# Patient Record
Sex: MALE | Race: WHITE | NOT HISPANIC OR LATINO
[De-identification: names, ages, dates, MRNs, and addresses within clinical notes are randomized per-mention and may not be internally consistent; named-entity substitution may affect disease eponyms.]

---

## 2021-08-02 PROBLEM — Z00.00 ENCOUNTER FOR PREVENTIVE HEALTH EXAMINATION: Status: ACTIVE | Noted: 2021-08-02

## 2021-08-12 ENCOUNTER — APPOINTMENT (OUTPATIENT)
Dept: UROLOGY | Facility: CLINIC | Age: 51
End: 2021-08-12
Payer: MEDICAID

## 2021-08-12 PROCEDURE — 99204 OFFICE O/P NEW MOD 45 MIN: CPT

## 2021-08-13 LAB
ANION GAP SERPL CALC-SCNC: 13 MMOL/L
APPEARANCE: CLEAR
BACTERIA: NEGATIVE
BILIRUBIN URINE: NEGATIVE
BLOOD URINE: NEGATIVE
BUN SERPL-MCNC: 16 MG/DL
CALCIUM SERPL-MCNC: 9.7 MG/DL
CHLORIDE SERPL-SCNC: 98 MMOL/L
CO2 SERPL-SCNC: 29 MMOL/L
COLOR: YELLOW
CREAT SERPL-MCNC: 1.15 MG/DL
GLUCOSE QUALITATIVE U: NEGATIVE
GLUCOSE SERPL-MCNC: 118 MG/DL
HYALINE CASTS: 0 /LPF
KETONES URINE: NEGATIVE
LEUKOCYTE ESTERASE URINE: NEGATIVE
MICROSCOPIC-UA: NORMAL
NITRITE URINE: NEGATIVE
PH URINE: 6
POTASSIUM SERPL-SCNC: 4.4 MMOL/L
PROTEIN URINE: NORMAL
PSA FREE FLD-MCNC: 20 %
PSA FREE SERPL-MCNC: 0.26 NG/ML
PSA SERPL-MCNC: 1.27 NG/ML
RED BLOOD CELLS URINE: 1 /HPF
SODIUM SERPL-SCNC: 140 MMOL/L
SPECIFIC GRAVITY URINE: 1.02
SQUAMOUS EPITHELIAL CELLS: 0 /HPF
UROBILINOGEN URINE: NORMAL
WHITE BLOOD CELLS URINE: 1 /HPF

## 2021-08-16 LAB — URINE CYTOLOGY: NORMAL

## 2021-08-20 ENCOUNTER — OUTPATIENT (OUTPATIENT)
Dept: OUTPATIENT SERVICES | Facility: HOSPITAL | Age: 51
LOS: 1 days | End: 2021-08-20
Payer: MEDICAID

## 2021-08-20 VITALS
HEIGHT: 72 IN | OXYGEN SATURATION: 97 % | HEART RATE: 95 BPM | RESPIRATION RATE: 16 BRPM | DIASTOLIC BLOOD PRESSURE: 90 MMHG | SYSTOLIC BLOOD PRESSURE: 150 MMHG | WEIGHT: 315 LBS | TEMPERATURE: 98 F

## 2021-08-20 DIAGNOSIS — Z00.00 ENCOUNTER FOR GENERAL ADULT MEDICAL EXAMINATION WITHOUT ABNORMAL FINDINGS: ICD-10-CM

## 2021-08-20 DIAGNOSIS — Z00.01 ENCOUNTER FOR GENERAL ADULT MEDICAL EXAMINATION WITH ABNORMAL FINDINGS: ICD-10-CM

## 2021-08-20 DIAGNOSIS — Z98.890 OTHER SPECIFIED POSTPROCEDURAL STATES: Chronic | ICD-10-CM

## 2021-08-20 DIAGNOSIS — N28.89 OTHER SPECIFIED DISORDERS OF KIDNEY AND URETER: ICD-10-CM

## 2021-08-20 LAB
A1C WITH ESTIMATED AVERAGE GLUCOSE RESULT: 6.8 % — HIGH (ref 4–5.6)
ALBUMIN SERPL ELPH-MCNC: 3.9 G/DL — SIGNIFICANT CHANGE UP (ref 3.3–5)
ALP SERPL-CCNC: 116 U/L — SIGNIFICANT CHANGE UP (ref 40–120)
ALT FLD-CCNC: 14 U/L — SIGNIFICANT CHANGE UP (ref 4–41)
ANION GAP SERPL CALC-SCNC: 12 MMOL/L — SIGNIFICANT CHANGE UP (ref 7–14)
AST SERPL-CCNC: 12 U/L — SIGNIFICANT CHANGE UP (ref 4–40)
BILIRUB SERPL-MCNC: 0.3 MG/DL — SIGNIFICANT CHANGE UP (ref 0.2–1.2)
BLD GP AB SCN SERPL QL: NEGATIVE — SIGNIFICANT CHANGE UP
BUN SERPL-MCNC: 15 MG/DL — SIGNIFICANT CHANGE UP (ref 7–23)
CALCIUM SERPL-MCNC: 9.4 MG/DL — SIGNIFICANT CHANGE UP (ref 8.4–10.5)
CHLORIDE SERPL-SCNC: 99 MMOL/L — SIGNIFICANT CHANGE UP (ref 98–107)
CO2 SERPL-SCNC: 28 MMOL/L — SIGNIFICANT CHANGE UP (ref 22–31)
CREAT SERPL-MCNC: 0.95 MG/DL — SIGNIFICANT CHANGE UP (ref 0.5–1.3)
ESTIMATED AVERAGE GLUCOSE: 148 — SIGNIFICANT CHANGE UP
GLUCOSE SERPL-MCNC: 112 MG/DL — HIGH (ref 70–99)
HCT VFR BLD CALC: 51.5 % — HIGH (ref 39–50)
HGB BLD-MCNC: 16 G/DL — SIGNIFICANT CHANGE UP (ref 13–17)
MCHC RBC-ENTMCNC: 27.5 PG — SIGNIFICANT CHANGE UP (ref 27–34)
MCHC RBC-ENTMCNC: 31.1 GM/DL — LOW (ref 32–36)
MCV RBC AUTO: 88.6 FL — SIGNIFICANT CHANGE UP (ref 80–100)
NRBC # BLD: 0 /100 WBCS — SIGNIFICANT CHANGE UP
NRBC # FLD: 0 K/UL — SIGNIFICANT CHANGE UP
PLATELET # BLD AUTO: 254 K/UL — SIGNIFICANT CHANGE UP (ref 150–400)
POTASSIUM SERPL-MCNC: 4.3 MMOL/L — SIGNIFICANT CHANGE UP (ref 3.5–5.3)
POTASSIUM SERPL-SCNC: 4.3 MMOL/L — SIGNIFICANT CHANGE UP (ref 3.5–5.3)
PROT SERPL-MCNC: 6.7 G/DL — SIGNIFICANT CHANGE UP (ref 6–8.3)
RBC # BLD: 5.81 M/UL — HIGH (ref 4.2–5.8)
RBC # FLD: 14.8 % — HIGH (ref 10.3–14.5)
RH IG SCN BLD-IMP: POSITIVE — SIGNIFICANT CHANGE UP
SODIUM SERPL-SCNC: 139 MMOL/L — SIGNIFICANT CHANGE UP (ref 135–145)
WBC # BLD: 10.17 K/UL — SIGNIFICANT CHANGE UP (ref 3.8–10.5)
WBC # FLD AUTO: 10.17 K/UL — SIGNIFICANT CHANGE UP (ref 3.8–10.5)

## 2021-08-20 PROCEDURE — 93010 ELECTROCARDIOGRAM REPORT: CPT

## 2021-08-20 NOTE — H&P PST ADULT - RS GEN PE MLT RESP DETAILS PC
airway patent/good air movement/respirations non-labored/clear to auscultation bilaterally/no rales/no rhonchi/no wheezes

## 2021-08-20 NOTE — H&P PST ADULT - PROBLEM SELECTOR PLAN 1
Pt scheduled for left radical nephrectomy.    Plan:  - PST instructions given ; NPO status instructions provided.  - Pt instructed to take following meds: Losartan and pantoprazole with sip of water :   - Pt instructed to take routine evening medications unless indicated .  -  Stop NSAIDS ( Aspirin Aleve Motrin Mobic Diclofenac), herbal supplements , MVI , Vitamin fish oil 7 days prior to surgery    - Medical and cardiac Optimization  with Dr Jazmín De Guzman  - EZ wash instructions given   - Labs peending, ekg in chart  -  Pt instructed to self quarantine .  - Covid Testing scheduled on 8/31/21..  Pt notified and aware.  - Pt denies covid symptoms shortness of breath fever cough .

## 2021-08-20 NOTE — H&P PST ADULT - HIV STATUS
Will not add additional sutures at this time. The patient is not healing well with the sutures, will refer patient to plastics with Dr. Ying. Sutures removed by Dr. Qian Rodriguez MD. Will refer to Dr. Ying for a plastic surgery consult. Patient is not healing well from the biopsy which is raising concern. Will send notes for a second consult. Detail Level: Zone Negative/Unknown

## 2021-08-20 NOTE — H&P PST ADULT - NEGATIVE GENERAL GENITOURINARY SYMPTOMS
no renal colic/no flank pain L/no flank pain R/no bladder infections/no incontinence/no dysuria/no urinary hesitancy/normal urinary frequency

## 2021-08-20 NOTE — H&P PST ADULT - NSANTHOSAYNRD_GEN_A_CORE
never tested/No. SANDIE screening performed.  STOP BANG Legend: 0-2 = LOW Risk; 3-4 = INTERMEDIATE Risk; 5-8 = HIGH Risk

## 2021-08-20 NOTE — H&P PST ADULT - ASSESSMENT
50 yrs old male who stated that he saw blood in his urine 3 wks ago and so went to the urologist and had multiple testing that showed a mass on his left kidney Pt presents for preop to have left radical nephrectomy

## 2021-08-20 NOTE — H&P PST ADULT - NSICDXPASTMEDICALHX_GEN_ALL_CORE_FT
PAST MEDICAL HISTORY:  Cigarette smoker for 8 yrs , ,1 pc daily    GERD (gastroesophageal reflux disease)     History of prediabetes     HTN (hypertension)     Morbid obesity

## 2021-08-20 NOTE — H&P PST ADULT - HISTORY OF PRESENT ILLNESS
50 yrs old male  50 yrs old male who stated that he saw blood in his urine 3 wks ago and so went to the urologist and had multiple testing that showed a mass on his left kidney Pt presents for preop to have left radical nephrectomy

## 2021-08-20 NOTE — H&P PST ADULT - PROBLEM SELECTOR PLAN 3
pt's bp 160//95 and 160/98 in pst.  Pt stated that he did not take am losartan today. Pt denies c/o chest pain, palpitations or SOB.  Pt advised to obtain cardiac clearance prior to surgery

## 2021-08-21 LAB
CULTURE RESULTS: NO GROWTH — SIGNIFICANT CHANGE UP
SPECIMEN SOURCE: SIGNIFICANT CHANGE UP

## 2021-08-24 ENCOUNTER — APPOINTMENT (OUTPATIENT)
Dept: NUCLEAR MEDICINE | Facility: IMAGING CENTER | Age: 51
End: 2021-08-24
Payer: MEDICAID

## 2021-08-24 ENCOUNTER — OUTPATIENT (OUTPATIENT)
Dept: OUTPATIENT SERVICES | Facility: HOSPITAL | Age: 51
LOS: 1 days | End: 2021-08-24
Payer: MEDICAID

## 2021-08-24 DIAGNOSIS — Z98.890 OTHER SPECIFIED POSTPROCEDURAL STATES: Chronic | ICD-10-CM

## 2021-08-24 DIAGNOSIS — Z00.8 ENCOUNTER FOR OTHER GENERAL EXAMINATION: ICD-10-CM

## 2021-08-24 PROBLEM — K21.9 GASTRO-ESOPHAGEAL REFLUX DISEASE WITHOUT ESOPHAGITIS: Chronic | Status: ACTIVE | Noted: 2021-08-20

## 2021-08-24 PROBLEM — I10 ESSENTIAL (PRIMARY) HYPERTENSION: Chronic | Status: ACTIVE | Noted: 2021-08-20

## 2021-08-24 PROBLEM — Z87.898 PERSONAL HISTORY OF OTHER SPECIFIED CONDITIONS: Chronic | Status: ACTIVE | Noted: 2021-08-20

## 2021-08-24 PROBLEM — J34.2 DEVIATED NASAL SEPTUM: Chronic | Status: ACTIVE | Noted: 2021-08-20

## 2021-08-24 PROBLEM — E66.01 MORBID (SEVERE) OBESITY DUE TO EXCESS CALORIES: Chronic | Status: ACTIVE | Noted: 2021-08-20

## 2021-08-24 PROCEDURE — 78306 BONE IMAGING WHOLE BODY: CPT | Mod: 26

## 2021-08-24 PROCEDURE — A9561: CPT

## 2021-08-24 PROCEDURE — 78306 BONE IMAGING WHOLE BODY: CPT

## 2021-08-31 ENCOUNTER — APPOINTMENT (OUTPATIENT)
Dept: DISASTER EMERGENCY | Facility: CLINIC | Age: 51
End: 2021-08-31

## 2021-08-31 LAB — SARS-COV-2 N GENE NPH QL NAA+PROBE: NOT DETECTED

## 2021-09-02 ENCOUNTER — TRANSCRIPTION ENCOUNTER (OUTPATIENT)
Age: 51
End: 2021-09-02

## 2021-09-03 ENCOUNTER — RESULT REVIEW (OUTPATIENT)
Age: 51
End: 2021-09-03

## 2021-09-03 ENCOUNTER — INPATIENT (INPATIENT)
Facility: HOSPITAL | Age: 51
LOS: 4 days | Discharge: HOME | End: 2021-09-08
Attending: UROLOGY | Admitting: UROLOGY
Payer: MEDICAID

## 2021-09-03 ENCOUNTER — APPOINTMENT (OUTPATIENT)
Dept: UROLOGY | Facility: HOSPITAL | Age: 51
End: 2021-09-03

## 2021-09-03 VITALS
SYSTOLIC BLOOD PRESSURE: 168 MMHG | HEART RATE: 110 BPM | RESPIRATION RATE: 16 BRPM | TEMPERATURE: 98 F | OXYGEN SATURATION: 96 % | HEIGHT: 72 IN | DIASTOLIC BLOOD PRESSURE: 94 MMHG | WEIGHT: 315 LBS

## 2021-09-03 DIAGNOSIS — I10 ESSENTIAL (PRIMARY) HYPERTENSION: ICD-10-CM

## 2021-09-03 DIAGNOSIS — I95.9 HYPOTENSION, UNSPECIFIED: ICD-10-CM

## 2021-09-03 DIAGNOSIS — Z29.9 ENCOUNTER FOR PROPHYLACTIC MEASURES, UNSPECIFIED: ICD-10-CM

## 2021-09-03 DIAGNOSIS — E11.9 TYPE 2 DIABETES MELLITUS WITHOUT COMPLICATIONS: ICD-10-CM

## 2021-09-03 DIAGNOSIS — Z98.890 OTHER SPECIFIED POSTPROCEDURAL STATES: Chronic | ICD-10-CM

## 2021-09-03 DIAGNOSIS — Z00.01 ENCOUNTER FOR GENERAL ADULT MEDICAL EXAMINATION WITH ABNORMAL FINDINGS: ICD-10-CM

## 2021-09-03 LAB
ANION GAP SERPL CALC-SCNC: 8 MMOL/L — SIGNIFICANT CHANGE UP (ref 7–14)
BUN SERPL-MCNC: 18 MG/DL — SIGNIFICANT CHANGE UP (ref 7–23)
CALCIUM SERPL-MCNC: 8.2 MG/DL — LOW (ref 8.4–10.5)
CHLORIDE SERPL-SCNC: 103 MMOL/L — SIGNIFICANT CHANGE UP (ref 98–107)
CO2 SERPL-SCNC: 26 MMOL/L — SIGNIFICANT CHANGE UP (ref 22–31)
CREAT SERPL-MCNC: 1.12 MG/DL — SIGNIFICANT CHANGE UP (ref 0.5–1.3)
GAS PNL BLDA: SIGNIFICANT CHANGE UP
GLUCOSE SERPL-MCNC: 172 MG/DL — HIGH (ref 70–99)
HCT VFR BLD CALC: 42 % — SIGNIFICANT CHANGE UP (ref 39–50)
HGB BLD-MCNC: 13.1 G/DL — SIGNIFICANT CHANGE UP (ref 13–17)
MAGNESIUM SERPL-MCNC: 1.8 MG/DL — SIGNIFICANT CHANGE UP (ref 1.6–2.6)
MCHC RBC-ENTMCNC: 27.5 PG — SIGNIFICANT CHANGE UP (ref 27–34)
MCHC RBC-ENTMCNC: 31.2 GM/DL — LOW (ref 32–36)
MCV RBC AUTO: 88.1 FL — SIGNIFICANT CHANGE UP (ref 80–100)
NRBC # BLD: 0 /100 WBCS — SIGNIFICANT CHANGE UP
NRBC # FLD: 0 K/UL — SIGNIFICANT CHANGE UP
PHOSPHATE SERPL-MCNC: 3.9 MG/DL — SIGNIFICANT CHANGE UP (ref 2.5–4.5)
PLATELET # BLD AUTO: 205 K/UL — SIGNIFICANT CHANGE UP (ref 150–400)
POTASSIUM SERPL-MCNC: 4.4 MMOL/L — SIGNIFICANT CHANGE UP (ref 3.5–5.3)
POTASSIUM SERPL-SCNC: 4.4 MMOL/L — SIGNIFICANT CHANGE UP (ref 3.5–5.3)
RBC # BLD: 4.77 M/UL — SIGNIFICANT CHANGE UP (ref 4.2–5.8)
RBC # FLD: 15.2 % — HIGH (ref 10.3–14.5)
SODIUM SERPL-SCNC: 137 MMOL/L — SIGNIFICANT CHANGE UP (ref 135–145)
WBC # BLD: 13.22 K/UL — HIGH (ref 3.8–10.5)
WBC # FLD AUTO: 13.22 K/UL — HIGH (ref 3.8–10.5)

## 2021-09-03 PROCEDURE — 50225 REMOVAL KIDNEY OPEN COMPLEX: CPT | Mod: 50

## 2021-09-03 PROCEDURE — 88307 TISSUE EXAM BY PATHOLOGIST: CPT | Mod: 26

## 2021-09-03 PROCEDURE — 71045 X-RAY EXAM CHEST 1 VIEW: CPT | Mod: 26

## 2021-09-03 PROCEDURE — 99223 1ST HOSP IP/OBS HIGH 75: CPT

## 2021-09-03 PROCEDURE — 88312 SPECIAL STAINS GROUP 1: CPT | Mod: 26

## 2021-09-03 PROCEDURE — 88311 DECALCIFY TISSUE: CPT | Mod: 26

## 2021-09-03 RX ORDER — ALBUMIN HUMAN 25 %
250 VIAL (ML) INTRAVENOUS
Refills: 0 | Status: COMPLETED | OUTPATIENT
Start: 2021-09-03 | End: 2021-09-03

## 2021-09-03 RX ORDER — NALOXONE HYDROCHLORIDE 4 MG/.1ML
0.1 SPRAY NASAL
Refills: 0 | Status: DISCONTINUED | OUTPATIENT
Start: 2021-09-03 | End: 2021-09-08

## 2021-09-03 RX ORDER — ONDANSETRON 8 MG/1
4 TABLET, FILM COATED ORAL EVERY 6 HOURS
Refills: 0 | Status: DISCONTINUED | OUTPATIENT
Start: 2021-09-03 | End: 2021-09-08

## 2021-09-03 RX ORDER — PANTOPRAZOLE SODIUM 20 MG/1
40 TABLET, DELAYED RELEASE ORAL DAILY
Refills: 0 | Status: DISCONTINUED | OUTPATIENT
Start: 2021-09-03 | End: 2021-09-07

## 2021-09-03 RX ORDER — SENNA PLUS 8.6 MG/1
2 TABLET ORAL AT BEDTIME
Refills: 0 | Status: DISCONTINUED | OUTPATIENT
Start: 2021-09-03 | End: 2021-09-08

## 2021-09-03 RX ORDER — HYDROMORPHONE HYDROCHLORIDE 2 MG/ML
0.5 INJECTION INTRAMUSCULAR; INTRAVENOUS; SUBCUTANEOUS
Refills: 0 | Status: DISCONTINUED | OUTPATIENT
Start: 2021-09-03 | End: 2021-09-07

## 2021-09-03 RX ORDER — SODIUM CHLORIDE 9 MG/ML
1000 INJECTION, SOLUTION INTRAVENOUS
Refills: 0 | Status: DISCONTINUED | OUTPATIENT
Start: 2021-09-03 | End: 2021-09-08

## 2021-09-03 RX ORDER — HEPARIN SODIUM 5000 [USP'U]/ML
5000 INJECTION INTRAVENOUS; SUBCUTANEOUS EVERY 8 HOURS
Refills: 0 | Status: DISCONTINUED | OUTPATIENT
Start: 2021-09-03 | End: 2021-09-08

## 2021-09-03 RX ORDER — ONDANSETRON 8 MG/1
4 TABLET, FILM COATED ORAL EVERY 6 HOURS
Refills: 0 | Status: DISCONTINUED | OUTPATIENT
Start: 2021-09-03 | End: 2021-09-04

## 2021-09-03 RX ORDER — LOSARTAN POTASSIUM 100 MG/1
100 TABLET, FILM COATED ORAL DAILY
Refills: 0 | Status: DISCONTINUED | OUTPATIENT
Start: 2021-09-03 | End: 2021-09-04

## 2021-09-03 RX ORDER — SODIUM CHLORIDE 9 MG/ML
1000 INJECTION, SOLUTION INTRAVENOUS
Refills: 0 | Status: DISCONTINUED | OUTPATIENT
Start: 2021-09-03 | End: 2021-09-03

## 2021-09-03 RX ORDER — CEFAZOLIN SODIUM 1 G
1000 VIAL (EA) INJECTION EVERY 8 HOURS
Refills: 0 | Status: DISCONTINUED | OUTPATIENT
Start: 2021-09-03 | End: 2021-09-08

## 2021-09-03 RX ADMIN — HEPARIN SODIUM 5000 UNIT(S): 5000 INJECTION INTRAVENOUS; SUBCUTANEOUS at 22:27

## 2021-09-03 RX ADMIN — SODIUM CHLORIDE 150 MILLILITER(S): 9 INJECTION, SOLUTION INTRAVENOUS at 13:00

## 2021-09-03 RX ADMIN — Medication 500 MILLILITER(S): at 13:37

## 2021-09-03 RX ADMIN — Medication 100 MILLIGRAM(S): at 18:24

## 2021-09-03 RX ADMIN — Medication 500 MILLILITER(S): at 13:00

## 2021-09-03 NOTE — CONSULT NOTE ADULT - PROBLEM SELECTOR RECOMMENDATION 3
Currently hypotensive.  -Hold  Losartan  -Resume Losartan with holding perimeter when BP normalizes  -Monitor BP

## 2021-09-03 NOTE — ASU PATIENT PROFILE, ADULT - REASON FOR ADMISSION, PROFILE
Anxiety    Parnell esophagus    Gastritis    Herniated disc, cervical    Hypertension    Migraine    Panic attack as reaction to stress    S/P  section    Thyroid nodule Kidney left removed

## 2021-09-03 NOTE — CONSULT NOTE ADULT - ASSESSMENT
50 yrs old male with h/o HTN, DM2, obesity, renal mass  admitetd for open  left radical nephrectomy today. S/P Procedure, POD#0.

## 2021-09-03 NOTE — PROGRESS NOTE ADULT - ASSESSMENT
50 year old male s/p Exploratory laparotomy, left open radical nephrectomy, EBL 600ml, NGT, and chest tube in place, some hypotension in the PACU improved after receiving Albumin x2, stable.     -Strict I&O's  -Analgesia via PCEA   -Antiemetics prn  -Continue NGT  -Continue chest tube  -Ancef  -DVT prophylaxis  -Incentive spirometry  -NPO  -OOB  -AM labs

## 2021-09-03 NOTE — CONSULT NOTE ADULT - PROBLEM SELECTOR RECOMMENDATION 2
Borderline hypotensive   -Volume resuscitation as per PACU  -Consider SICU evaluation  -F/U CBC, PRBC as indicated

## 2021-09-03 NOTE — CONSULT NOTE ADULT - PROBLEM SELECTOR RECOMMENDATION 9
S/P open left radical nephrectomy. POD#0  -management as per   -Chest tube care as per   -pain control  -Bowel regimen  -Incentive spirometer  -OOB  -DVT prophylaxis  -O2 and BiPAP as per PACU  -Consider SICU eval if pt can not be weaned off BiPAP

## 2021-09-03 NOTE — CONSULT NOTE ADULT - SUBJECTIVE AND OBJECTIVE BOX
Patient is a 50y old  Male who presents with a chief complaint of " I am going to have my left kidney removed"       HPI: Pt seen and examined in PACU. Limited history obtained due to BiPAP   50 yrs old male with h/o HTN, DM2, obesity, renal mass  admitetd for open  left radical nephrectomy today. S/P Procedure, POD#0. Pt has left Chest tube, Shukla post op.  Pt was given O2 and BiPAP after surgery. Denies any CP or SOB.       History limited due to: [ ] Dementia  [ ] Delirium  [ ] Condition    Pain Symptoms if applicable:             	                            mild           Pain:	                         1-3	       Location:	left abd   Modifying factors:	  Associated symptoms:	    Function: [x ] Independent  [ ] Assistance  [ ] Total care  [ ] Non-ambulatory    Allergies    No Known Allergies    Intolerances        HOME MEDICATIONS: [ x] Reviewed    MEDICATIONS  (STANDING):  albumin human  5% IVPB 250 milliLiter(s) IV Intermittent every 30 minutes  ceFAZolin   IVPB 1000 milliGRAM(s) IV Intermittent every 8 hours  heparin   Injectable 5000 Unit(s) SubCutaneous every 8 hours  hydromorphone (10 MICROgram(s)/mL) + bupivacaine 0.0625% in 0.9% Sodium Chloride PCEA 250 milliLiter(s) Epidural PCA Continuous  lactated ringers. 1000 milliLiter(s) (150 mL/Hr) IV Continuous <Continuous>  losartan 100 milliGRAM(s) Oral daily  pantoprazole  Injectable 40 milliGRAM(s) IV Push daily    MEDICATIONS  (PRN):  hydromorphone (10 MICROgram(s)/mL) + bupivacaine 0.0625% in 0.9% Sodium Chloride PCEA Rescue Clinician  Bolus 5 milliLiter(s) Epidural every 15 minutes PRN for Pain Score greater than 6  naloxone Injectable 0.1 milliGRAM(s) IV Push every 3 minutes PRN For ANY of the following changes in patient status:  A. RR LESS THAN 10 breaths per minute, B. Oxygen saturation LESS THAN 90%, C. Sedation score of 6  ondansetron Injectable 4 milliGRAM(s) IV Push every 6 hours PRN Nausea  ondansetron Injectable 4 milliGRAM(s) IV Push every 6 hours PRN Nausea and/or Vomiting  senna 2 Tablet(s) Oral at bedtime PRN Constipation      PAST MEDICAL & SURGICAL HISTORY:  Morbid obesity    Cigarette smoker  for 8 yrs , ,1 pc daily    HTN (hypertension)    GERD (gastroesophageal reflux disease)    History of prediabetes    Deviated nasal septum    H/O colonoscopy    [ x] Reviewed     SOCIAL HISTORY:  Residence: [ ] [facility]  [ ] SNF  [x ] Community  [ ] Substance abuse: denies  [ ] Tobacco: current smoker 0.9 PD X 7 years   [ ] Alcohol use:  Socially use, CAGE (-)     FAMILY HISTORY:  [x ] No pertinent family history in first degree relatives     REVIEW OF SYSTEMS:    CONSTITUTIONAL: No fever, weight loss, or fatigue  EYES: No eye pain, visual disturbances, or discharge  ENMT:  No difficulty hearing, tinnitus, vertigo; No sinus or throat pain  NECK: No pain or stiffness  RESPIRATORY: No cough, wheezing, chills or hemoptysis; No shortness of breath, (+) left chest tube   CARDIOVASCULAR: No chest pain, palpitations, dizziness, or leg swelling  GASTROINTESTINAL: No abdominal or epigastric pain. No nausea, vomiting, or hematemesis; No diarrhea or constipation. No melena or hematochezia. No flatus or BM post op   GENITOURINARY: No dysuria, frequency, hematuria, or incontinence. (+) shukla post op   NEUROLOGICAL: No headaches, memory loss, loss of strength, numbness, or tremors  SKIN: No itching, burning, rashes, or lesions   LYMPH NODES: No enlarged glands  ENDOCRINE: No heat or cold intolerance; No hair loss  MUSCULOSKELETAL: No muscle or back pain  PSYCHIATRIC: No depression, anxiety, mood swings, or difficulty sleeping  HEME/LYMPH: No easy bruising, or bleeding gums  ALLERGY AND IMMUNOLOGIC: No hives or eczema    [  ] All other ROS negative  [  ] Unable to obtain due to poor mental status    Vital Signs Last 24 Hrs  T(C): 36.8 (03 Sep 2021 07:00), Max: 36.8 (03 Sep 2021 07:00)  T(F): 98.3 (03 Sep 2021 07:00), Max: 98.3 (03 Sep 2021 07:00)  HR: 75 (03 Sep 2021 12:50) (75 - 110)  BP: 168/94 (03 Sep 2021 07:00) (168/94 - 168/94)  BP(mean): --  RR: 16 (03 Sep 2021 07:00) (16 - 16)  SpO2: 100% (03 Sep 2021 12:50) (96% - 100%)    PHYSICAL EXAM:    GENERAL: NAD, well-groomed, well-developed, obese   HEAD:  Atraumatic, Normocephalic  EYES: EOMI, PERRLA, conjunctiva and sclera clear.   ENMT: Moist mucous membranes, (+) BiPAP mask     NECK: Supple, No JVD  RESPIRATORY: Clear to auscultation bilaterally; No rales, rhonchi, wheezing, or rubs, (+) left CT.   CARDIOVASCULAR: Regular rate and rhythm; No murmurs, rubs, or gallops  GASTROINTESTINAL: Soft, mildly tender, obese; Bowel sounds hypoactive   GENITOURINARY: (+) shukla   EXTREMITIES:  2+ Peripheral Pulses, No clubbing, cyanosis, or edema  NERVOUS SYSTEM:  Alert & Oriented X3; Moving all 4 extremities; No gross sensory deficits  HEME/LYMPH: No lymphadenopathy noted  SKIN: No rashes or lesions; Incisions C/D/I    LABS:  Complete Blood Count (08.20.21 @ 11:02)   Nucleated RBC: 0 /100 WBCs   WBC Count: 10.17 K/uL   RBC Count: 5.81 M/uL   Hemoglobin: 16.0 g/dL   Hematocrit: 51.5 %   Mean Cell Volume: 88.6 fL   Mean Cell Hemoglobin: 27.5 pg   Mean Cell Hemoglobin Conc: 31.1 gm/dL   Red Cell Distrib Width: 14.8 %   Platelet Count - Automated: 254 K/uL   Nucleated RBC #: 0.00 K/uL Comprehensive Metabolic Panel (08.20.21 @ 11:02)   Sodium, Serum: 139 mmol/L   Potassium, Serum: 4.3 mmol/L   Chloride, Serum: 99 mmol/L   Carbon Dioxide, Serum: 28 mmol/L   Anion Gap, Serum: 12 mmol/L   Blood Urea Nitrogen, Serum: 15 mg/dL   Creatinine, Serum: 0.95 mg/dL   Glucose, Serum: 112 mg/dL   Calcium, Total Serum: 9.4 mg/dL   Protein Total, Serum: 6.7 g/dL   Albumin, Serum: 3.9 g/dL   Bilirubin Total, Serum: 0.3 mg/dL   Alkaline Phosphatase, Serum: 116 U/L   Aspartate Aminotransferase (AST/SGOT): 12 U/L   Alanine Aminotransferase (ALT/SGPT): 14 U/L A1C with Estimated Average Glucose (08.20.21 @ 11:02)   A1C with Estimated Average Glucose Result: 6.8: High Risk (prediabetic) 5.7 - 6.4 %   Diabetic, diagnostic > 6.5 %   ADA diabetic treatment goal < 7.0 %   HbA1C values may not accurately reflect mean blood glucose in patients   with Hb variants. Suggest clinical correlation. %   Estimated Average Glucose: 148             CAPILLARY BLOOD GLUCOSE          RADIOLOGY & ADDITIONAL STUDIES:    EKG:   Personally Reviewed:  [x ] YES NSR, (+) QTc prologation, (+) non-specific TW changes     Imaging:   Personally Reviewed:  [ ] YES               Consultant(s) notes reviewed:    Care Discussed with Consultant(s)/Other Providers:    Advanced Directives: [ ] DNR  [ ] No feeding tube  [ ] MOLST in chart  [ ] MOLST completed today  [ x] Unknown

## 2021-09-04 LAB
ANION GAP SERPL CALC-SCNC: 9 MMOL/L — SIGNIFICANT CHANGE UP (ref 7–14)
APTT BLD: 28.3 SEC — SIGNIFICANT CHANGE UP (ref 27–36.3)
BASOPHILS # BLD AUTO: 0.03 K/UL — SIGNIFICANT CHANGE UP (ref 0–0.2)
BASOPHILS NFR BLD AUTO: 0.3 % — SIGNIFICANT CHANGE UP (ref 0–2)
BUN SERPL-MCNC: 14 MG/DL — SIGNIFICANT CHANGE UP (ref 7–23)
CALCIUM SERPL-MCNC: 8.4 MG/DL — SIGNIFICANT CHANGE UP (ref 8.4–10.5)
CHLORIDE SERPL-SCNC: 98 MMOL/L — SIGNIFICANT CHANGE UP (ref 98–107)
CO2 SERPL-SCNC: 27 MMOL/L — SIGNIFICANT CHANGE UP (ref 22–31)
COVID-19 SPIKE DOMAIN AB INTERP: POSITIVE
COVID-19 SPIKE DOMAIN ANTIBODY RESULT: >250 U/ML — HIGH
CREAT SERPL-MCNC: 1.24 MG/DL — SIGNIFICANT CHANGE UP (ref 0.5–1.3)
EOSINOPHIL # BLD AUTO: 0.03 K/UL — SIGNIFICANT CHANGE UP (ref 0–0.5)
EOSINOPHIL NFR BLD AUTO: 0.3 % — SIGNIFICANT CHANGE UP (ref 0–6)
GLUCOSE SERPL-MCNC: 142 MG/DL — HIGH (ref 70–99)
HCT VFR BLD CALC: 44.1 % — SIGNIFICANT CHANGE UP (ref 39–50)
HGB BLD-MCNC: 13.8 G/DL — SIGNIFICANT CHANGE UP (ref 13–17)
IANC: 9.61 K/UL — HIGH (ref 1.5–8.5)
IMM GRANULOCYTES NFR BLD AUTO: 0.3 % — SIGNIFICANT CHANGE UP (ref 0–1.5)
INR BLD: 1.18 RATIO — HIGH (ref 0.88–1.16)
LYMPHOCYTES # BLD AUTO: 0.96 K/UL — LOW (ref 1–3.3)
LYMPHOCYTES # BLD AUTO: 8.4 % — LOW (ref 13–44)
MCHC RBC-ENTMCNC: 27.9 PG — SIGNIFICANT CHANGE UP (ref 27–34)
MCHC RBC-ENTMCNC: 31.3 GM/DL — LOW (ref 32–36)
MCV RBC AUTO: 89.1 FL — SIGNIFICANT CHANGE UP (ref 80–100)
MONOCYTES # BLD AUTO: 0.72 K/UL — SIGNIFICANT CHANGE UP (ref 0–0.9)
MONOCYTES NFR BLD AUTO: 6.3 % — SIGNIFICANT CHANGE UP (ref 2–14)
NEUTROPHILS # BLD AUTO: 9.61 K/UL — HIGH (ref 1.8–7.4)
NEUTROPHILS NFR BLD AUTO: 84.4 % — HIGH (ref 43–77)
NRBC # BLD: 0 /100 WBCS — SIGNIFICANT CHANGE UP
NRBC # FLD: 0 K/UL — SIGNIFICANT CHANGE UP
PLATELET # BLD AUTO: 201 K/UL — SIGNIFICANT CHANGE UP (ref 150–400)
POTASSIUM SERPL-MCNC: 4.1 MMOL/L — SIGNIFICANT CHANGE UP (ref 3.5–5.3)
POTASSIUM SERPL-SCNC: 4.1 MMOL/L — SIGNIFICANT CHANGE UP (ref 3.5–5.3)
PROTHROM AB SERPL-ACNC: 13.4 SEC — SIGNIFICANT CHANGE UP (ref 10.6–13.6)
RBC # BLD: 4.95 M/UL — SIGNIFICANT CHANGE UP (ref 4.2–5.8)
RBC # FLD: 15.6 % — HIGH (ref 10.3–14.5)
SARS-COV-2 IGG+IGM SERPL QL IA: >250 U/ML — HIGH
SARS-COV-2 IGG+IGM SERPL QL IA: POSITIVE
SODIUM SERPL-SCNC: 134 MMOL/L — LOW (ref 135–145)
WBC # BLD: 11.38 K/UL — HIGH (ref 3.8–10.5)
WBC # FLD AUTO: 11.38 K/UL — HIGH (ref 3.8–10.5)

## 2021-09-04 PROCEDURE — 71045 X-RAY EXAM CHEST 1 VIEW: CPT | Mod: 26

## 2021-09-04 PROCEDURE — 99232 SBSQ HOSP IP/OBS MODERATE 35: CPT

## 2021-09-04 RX ORDER — METOPROLOL TARTRATE 50 MG
5 TABLET ORAL EVERY 6 HOURS
Refills: 0 | Status: DISCONTINUED | OUTPATIENT
Start: 2021-09-04 | End: 2021-09-07

## 2021-09-04 RX ORDER — LANOLIN ALCOHOL/MO/W.PET/CERES
3 CREAM (GRAM) TOPICAL AT BEDTIME
Refills: 0 | Status: DISCONTINUED | OUTPATIENT
Start: 2021-09-04 | End: 2021-09-08

## 2021-09-04 RX ORDER — ONDANSETRON 8 MG/1
4 TABLET, FILM COATED ORAL EVERY 8 HOURS
Refills: 0 | Status: DISCONTINUED | OUTPATIENT
Start: 2021-09-04 | End: 2021-09-04

## 2021-09-04 RX ORDER — ACETAMINOPHEN 500 MG
1000 TABLET ORAL EVERY 6 HOURS
Refills: 0 | Status: COMPLETED | OUTPATIENT
Start: 2021-09-04 | End: 2021-09-05

## 2021-09-04 RX ORDER — BENZOCAINE AND MENTHOL 5; 1 G/100ML; G/100ML
1 LIQUID ORAL THREE TIMES A DAY
Refills: 0 | Status: DISCONTINUED | OUTPATIENT
Start: 2021-09-04 | End: 2021-09-08

## 2021-09-04 RX ADMIN — Medication 5 MILLIGRAM(S): at 23:43

## 2021-09-04 RX ADMIN — Medication 400 MILLIGRAM(S): at 23:43

## 2021-09-04 RX ADMIN — Medication 100 MILLIGRAM(S): at 00:47

## 2021-09-04 RX ADMIN — HEPARIN SODIUM 5000 UNIT(S): 5000 INJECTION INTRAVENOUS; SUBCUTANEOUS at 23:43

## 2021-09-04 RX ADMIN — Medication 100 MILLIGRAM(S): at 08:26

## 2021-09-04 RX ADMIN — Medication 5 MILLIGRAM(S): at 12:56

## 2021-09-04 RX ADMIN — Medication 100 MILLIGRAM(S): at 23:43

## 2021-09-04 RX ADMIN — Medication 5 MILLIGRAM(S): at 18:29

## 2021-09-04 RX ADMIN — Medication 100 MILLIGRAM(S): at 15:11

## 2021-09-04 RX ADMIN — HEPARIN SODIUM 5000 UNIT(S): 5000 INJECTION INTRAVENOUS; SUBCUTANEOUS at 14:07

## 2021-09-04 RX ADMIN — PANTOPRAZOLE SODIUM 40 MILLIGRAM(S): 20 TABLET, DELAYED RELEASE ORAL at 12:56

## 2021-09-04 RX ADMIN — HEPARIN SODIUM 5000 UNIT(S): 5000 INJECTION INTRAVENOUS; SUBCUTANEOUS at 08:25

## 2021-09-04 NOTE — PHYSICAL THERAPY INITIAL EVALUATION ADULT - GENERAL OBSERVATIONS, REHAB EVAL
Pt received semisupine in bed, +shukla, +IV/PCEA, +telemetry/, +oxygen via nasal cannula, +ng to suction, +chest tube to suction, in NAD. Pt agreeable to participate in PT evaluation.

## 2021-09-04 NOTE — PROGRESS NOTE ADULT - ASSESSMENT
50 year old male s/p Exploratory laparotomy, left open radical nephrectomy, EBL 600ml, NGT, and chest tube in place, some hypotension in the PACU improved after receiving Albumin x2, stable.     -Strict I&O's  -Analgesia via PCEA   -Antiemetics prn  -Continue NGT  -Continue chest tube  -Ancef  -DVT prophylaxis, Please find SCD machine   -Incentive spirometry  -NPO  -OOB  -AM labs pending

## 2021-09-04 NOTE — PHYSICAL THERAPY INITIAL EVALUATION ADULT - PATIENT PROFILE REVIEW, REHAB EVAL
PT orders received: ambulate as tolerated. Consult with JANES HAGEN, patient may participate in PT evaluation./yes

## 2021-09-04 NOTE — PROGRESS NOTE ADULT - SUBJECTIVE AND OBJECTIVE BOX
Dr. Silvana Stanton  Pager [phone]    PROGRESS NOTE:     Patient is a 50y old  Male who presents with a chief complaint of " I am going to have my left kidney removed" (20 Aug 2021 08:06)      SUBJECTIVE / OVERNIGHT EVENTS: pt is on 2L NC, no flatus yet, c/o incisional pain left flank  ADDITIONAL REVIEW OF SYSTEMS: afebrile, denies chest pain/sob    MEDICATIONS  (STANDING):  ceFAZolin   IVPB 1000 milliGRAM(s) IV Intermittent every 8 hours  heparin   Injectable 5000 Unit(s) SubCutaneous every 8 hours  hydromorphone (10 MICROgram(s)/mL) + bupivacaine 0.0625% in 0.9% Sodium Chloride PCEA 250 milliLiter(s) Epidural PCA Continuous  lactated ringers. 1000 milliLiter(s) (150 mL/Hr) IV Continuous <Continuous>  metoprolol tartrate Injectable 5 milliGRAM(s) IV Push every 6 hours  pantoprazole  Injectable 40 milliGRAM(s) IV Push daily    MEDICATIONS  (PRN):  HYDROmorphone  Injectable 0.5 milliGRAM(s) IV Push every 3 hours PRN Severe breakthrough Pain (7 - 10)  hydromorphone (10 MICROgram(s)/mL) + bupivacaine 0.0625% in 0.9% Sodium Chloride PCEA Rescue Clinician  Bolus 5 milliLiter(s) Epidural every 15 minutes PRN for Pain Score greater than 6  naloxone Injectable 0.1 milliGRAM(s) IV Push every 3 minutes PRN For ANY of the following changes in patient status:  A. RR LESS THAN 10 breaths per minute, B. Oxygen saturation LESS THAN 90%, C. Sedation score of 6  ondansetron Injectable 4 milliGRAM(s) IV Push every 6 hours PRN Nausea  ondansetron Injectable 4 milliGRAM(s) IV Push every 6 hours PRN Nausea and/or Vomiting  senna 2 Tablet(s) Oral at bedtime PRN Constipation      CAPILLARY BLOOD GLUCOSE        I&O's Summary    03 Sep 2021 07:01  -  04 Sep 2021 07:00  --------------------------------------------------------  IN: 3230 mL / OUT: 2150 mL / NET: 1080 mL    04 Sep 2021 07:01  -  04 Sep 2021 11:17  --------------------------------------------------------  IN: 350 mL / OUT: 390 mL / NET: -40 mL        PHYSICAL EXAM:  Vital Signs Last 24 Hrs  T(C): 36.7 (04 Sep 2021 06:00), Max: 37.1 (04 Sep 2021 02:34)  T(F): 98.1 (04 Sep 2021 06:00), Max: 98.8 (04 Sep 2021 02:34)  HR: 118 (04 Sep 2021 06:00) (73 - 122)  BP: 150/70 (04 Sep 2021 06:00) (97/61 - 161/96)  BP(mean): 108 (03 Sep 2021 20:15) (67 - 108)  RR: 17 (04 Sep 2021 06:00) (11 - 24)  SpO2: 93% (04 Sep 2021 06:00) (91% - 100%)  CONSTITUTIONAL: NAD, well-developed, obese  RESPIRATORY: Normal respiratory effort; lungs clear, decreased AE  CARDIOVASCULAR: Regular, tachycardic, no murmur  ABDOMEN: soft, incisional tenderness, left chest tube  : shukla in place  MUSCULOSKELETAL: no clubbing or cyanosis of digits; no joint swelling or tenderness to palpation  PSYCH: A+O to person, place, and time; affect appropriate    LABS:                        13.8   11.38 )-----------( 201      ( 04 Sep 2021 07:36 )             44.1     09-04    134<L>  |  98  |  14  ----------------------------<  142<H>  4.1   |  27  |  1.24    Ca    8.4      04 Sep 2021 07:45  Phos  3.9     09-03  Mg     1.80     09-03      PT/INR - ( 04 Sep 2021 09:13 )   PT: 13.4 sec;   INR: 1.18 ratio         PTT - ( 04 Sep 2021 09:13 )  PTT:28.3 sec      RADIOLOGY & ADDITIONAL TESTS:  Results Reviewed:   Imaging Personally Reviewed:  Electrocardiogram Personally Reviewed:    COORDINATION OF CARE:  Care Discussed with Consultants/Other Providers [Y/N]: urology marixa Red fxn, losartan on hold, on iv lopressor  Prior or Outpatient Records Reviewed [Y/N]:

## 2021-09-04 NOTE — PROGRESS NOTE ADULT - SUBJECTIVE AND OBJECTIVE BOX
Interval events:   no acute events     SUBJECTIVE:  Reports discomfort with NGT. He has not been out of bed. He is not yet passing flatus.       OBJECTIVE:  Vital Signs Last 24 Hrs  T(C): 36.7 (04 Sep 2021 06:00), Max: 37.1 (04 Sep 2021 02:34)  T(F): 98.1 (04 Sep 2021 06:00), Max: 98.8 (04 Sep 2021 02:34)  HR: 118 (04 Sep 2021 06:00) (73 - 122)  BP: 150/70 (04 Sep 2021 06:00) (97/61 - 161/96)  BP(mean): 108 (03 Sep 2021 20:15) (67 - 108)  RR: 17 (04 Sep 2021 06:00) (11 - 24)  SpO2: 93% (04 Sep 2021 06:00) (91% - 100%)    Physical Examination:  GEN: NAD, resting quietly  PULM: CT to low continuous wall suction  ABD: soft, flank incision c/d/i, NGT re-secured   : Plummer secured with clear urine         LABS:                        13.1   13.22 )-----------( 205      ( 03 Sep 2021 13:22 )             42.0       09-03    137  |  103  |  18  ----------------------------<  172<H>  4.4   |  26  |  1.12    Ca    8.2<L>      03 Sep 2021 13:22  Phos  3.9     09-03  Mg     1.80     09-03

## 2021-09-04 NOTE — PHYSICAL THERAPY INITIAL EVALUATION ADULT - PERTINENT HX OF CURRENT PROBLEM, REHAB EVAL
Pt is a 50 year old male presenting with a pre-operative diagnosis of left renal mass. Pt POD#1 s/p exploratory laparotomy, left open radical nephrectomy. PMH listed below.

## 2021-09-04 NOTE — PHYSICAL THERAPY INITIAL EVALUATION ADULT - DISCHARGE DISPOSITION, PT EVAL
Anticipating home, no skilled PT needs. Gait distance limited by line management, please continue to follow progress notes.

## 2021-09-04 NOTE — PROGRESS NOTE ADULT - SUBJECTIVE AND OBJECTIVE BOX
Anesthesia Pain Management Service: Day _2_ of Epidural    SUBJECTIVE: Patient doing well with PCEA and no problems.  Pain Scale Score: 0/10 at rest, has some pain with movement.    Refer to charted pain scores    THERAPY:  [x ] Epidural Bupivacaine 0.0625% and Hydromorphone  		[ X] 10 micrograms/mL	[ ] 5 micrograms/mL  [ ] Epidural Bupivacaine 0.0625% and Fentanyl - 2 micrograms/mL  [ ] Epidural Ropivacaine 0.1% plain – 1 mg/mL  [ ] Patient Controlled Regional Anesthesia (PCRA) Ropivacaine  		[ ] 0.2%			[ ] 0.1%    Demand dose __3_ lockout __15_ (minutes) Continuous Rate __6_ Total: _169.9___ ml used (in past 24 hours)      MEDICATIONS  (STANDING):  ceFAZolin   IVPB 1000 milliGRAM(s) IV Intermittent every 8 hours  heparin   Injectable 5000 Unit(s) SubCutaneous every 8 hours  hydromorphone (10 MICROgram(s)/mL) + bupivacaine 0.0625% in 0.9% Sodium Chloride PCEA 250 milliLiter(s) Epidural PCA Continuous  lactated ringers. 1000 milliLiter(s) (150 mL/Hr) IV Continuous <Continuous>  metoprolol tartrate Injectable 5 milliGRAM(s) IV Push every 6 hours  pantoprazole  Injectable 40 milliGRAM(s) IV Push daily    MEDICATIONS  (PRN):  HYDROmorphone  Injectable 0.5 milliGRAM(s) IV Push every 3 hours PRN Severe breakthrough Pain (7 - 10)  hydromorphone (10 MICROgram(s)/mL) + bupivacaine 0.0625% in 0.9% Sodium Chloride PCEA Rescue Clinician  Bolus 5 milliLiter(s) Epidural every 15 minutes PRN for Pain Score greater than 6  naloxone Injectable 0.1 milliGRAM(s) IV Push every 3 minutes PRN For ANY of the following changes in patient status:  A. RR LESS THAN 10 breaths per minute, B. Oxygen saturation LESS THAN 90%, C. Sedation score of 6  ondansetron Injectable 4 milliGRAM(s) IV Push every 6 hours PRN Nausea  ondansetron Injectable 4 milliGRAM(s) IV Push every 6 hours PRN Nausea and/or Vomiting  senna 2 Tablet(s) Oral at bedtime PRN Constipation      OBJECTIVE:  Patient is sitting up in bed.     Assessment of Catheter Site:	[ ] Left	[ ] Right  [x ] Epidural 	[ ] Femoral	      [ ] Saphenous   [ ] Supraclavicular   [ ] Other:    [x ] Dressing intact	[x ] Site non-tender	[ x] Site without erythema, discharge, edema  [x ] Epidural tubing and connection checked	[x] Gross neurological exam within normal limits  [ ] Catheter removed – tip intact		[ ] Afebrile  	[ ] Febrile: ___   [ X] see Temp under VS below)    PT/INR - ( 04 Sep 2021 09:13 )   PT: 13.4 sec;   INR: 1.18 ratio         PTT - ( 04 Sep 2021 09:13 )  PTT:28.3 sec                      13.8   11.38 )-----------( 201      ( 04 Sep 2021 07:36 )             44.1     Vital Signs Last 24 Hrs  T(C): 36.7 (09-04-21 @ 06:00), Max: 37.1 (09-04-21 @ 02:34)  T(F): 98.1 (09-04-21 @ 06:00), Max: 98.8 (09-04-21 @ 02:34)  HR: 118 (09-04-21 @ 06:00) (89 - 122)  BP: 150/70 (09-04-21 @ 06:00) (136/76 - 161/96)  BP(mean): 108 (09-03-21 @ 20:15) (89 - 108)  RR: 17 (09-04-21 @ 06:00) (12 - 24)  SpO2: 93% (09-04-21 @ 06:00) (91% - 100%)      Sedation Score:	[x ] Alert	[ ] Drowsy	[ ] Arousable	[ ] Asleep	[ ] Unresponsive    Side Effects:	[x ] None	[ ] Nausea	[ ] Vomiting	[ ] Pruritus  		[ ] Weakness		[ ] Numbness	[ ] Other:    ASSESSMENT/ PLAN:    Therapy to  be:	[x ] Continue   [ ] Discontinued   [ ] Change to prn Analgesics    Documentation and Verification of current medications:  [ X ] Done	[ ] Not done, not eligible, reason:    Comments: Doing OK with epidural and may continue.  Recommend non-opioid adjuvant analgesics to be used when possible and when allowed by primary surgical team.    Progress Note written now but Patient was seen earlier.

## 2021-09-04 NOTE — PHYSICAL THERAPY INITIAL EVALUATION ADULT - ADDITIONAL COMMENTS
Pt lives in an apartment with elevator access. Prior to admission, pt ambulated independently, no assistive device.     Pt was left semisupine with head of bed elevated to 45°, all lines/tubes intact and call bell within reach, RN aware.

## 2021-09-04 NOTE — PROGRESS NOTE ADULT - SUBJECTIVE AND OBJECTIVE BOX
Anesthesia Pain Management Service: Day _2_ of Epidural    SUBJECTIVE: Patient doing well with PCEA and no problems.  Pain Scale Score:   Refer to charted pain scores    THERAPY:  [x ] Epidural Bupivacaine 0.0625% and Hydromorphone  		[ X] 10 micrograms/mL	[ ] 5 micrograms/mL  [ ] Epidural Bupivacaine 0.0625% and Fentanyl - 2 micrograms/mL  [ ] Epidural Ropivacaine 0.1% plain – 1 mg/mL  [ ] Patient Controlled Regional Anesthesia (PCRA) Ropivacaine  		[ ] 0.2%			[ ] 0.1%    Demand dose __3_ lockout __15_ (minutes) Continuous Rate __6_ Total: _169.9___ ml used (in past 24 hours)      MEDICATIONS  (STANDING):  acetaminophen  IVPB .. 1000 milliGRAM(s) IV Intermittent every 6 hours  ceFAZolin   IVPB 1000 milliGRAM(s) IV Intermittent every 8 hours  heparin   Injectable 5000 Unit(s) SubCutaneous every 8 hours  hydromorphone (10 MICROgram(s)/mL) + bupivacaine 0.0625% in 0.9% Sodium Chloride PCEA 250 milliLiter(s) Epidural PCA Continuous  lactated ringers. 1000 milliLiter(s) (150 mL/Hr) IV Continuous <Continuous>  metoprolol tartrate Injectable 5 milliGRAM(s) IV Push every 6 hours  pantoprazole  Injectable 40 milliGRAM(s) IV Push daily    MEDICATIONS  (PRN):  HYDROmorphone  Injectable 0.5 milliGRAM(s) IV Push every 3 hours PRN Severe breakthrough Pain (7 - 10)  hydromorphone (10 MICROgram(s)/mL) + bupivacaine 0.0625% in 0.9% Sodium Chloride PCEA Rescue Clinician  Bolus 5 milliLiter(s) Epidural every 15 minutes PRN for Pain Score greater than 6  naloxone Injectable 0.1 milliGRAM(s) IV Push every 3 minutes PRN For ANY of the following changes in patient status:  A. RR LESS THAN 10 breaths per minute, B. Oxygen saturation LESS THAN 90%, C. Sedation score of 6  ondansetron Injectable 4 milliGRAM(s) IV Push every 6 hours PRN Nausea  ondansetron Injectable 4 milliGRAM(s) IV Push every 6 hours PRN Nausea and/or Vomiting  senna 2 Tablet(s) Oral at bedtime PRN Constipation      OBJECTIVE:    Assessment of Catheter Site:	[ ] Left	[ ] Right  [x ] Epidural 	[ ] Femoral	      [ ] Saphenous   [ ] Supraclavicular   [ ] Other:    [x ] Dressing intact	[x ] Site non-tender	[ x] Site without erythema, discharge, edema  [x ] Epidural tubing and connection checked	[x] Gross neurological exam within normal limits  [ ] Catheter removed – tip intact		[ ] Afebrile  	[ ] Febrile: ___   [ X] see Temp under VS below)    PT/INR - ( 04 Sep 2021 09:13 )   PT: 13.4 sec;   INR: 1.18 ratio       PTT - ( 04 Sep 2021 09:13 )  PTT:28.3 sec                      13.8   11.38 )-----------( 201      ( 04 Sep 2021 07:36 )             44.1     Vital Signs Last 24 Hrs  T(C): 37.1 (09-04-21 @ 13:00), Max: 37.2 (09-04-21 @ 09:00)  T(F): 98.7 (09-04-21 @ 13:00), Max: 98.9 (09-04-21 @ 09:00)  HR: 101 (09-04-21 @ 13:00) (94 - 122)  BP: 151/60 (09-04-21 @ 09:00) (142/86 - 161/96)  BP(mean): 108 (09-03-21 @ 20:15) (97 - 108)  RR: 18 (09-04-21 @ 13:00) (12 - 24)  SpO2: 94% (09-04-21 @ 13:00) (91% - 100%)      Sedation Score:	[x ] Alert	[ ] Drowsy	[ ] Arousable	[ ] Asleep	[ ] Unresponsive    Side Effects:	[x ] None	[ ] Nausea	[ ] Vomiting	[ ] Pruritus  		[ ] Weakness		[ ] Numbness	[ ] Other:    ASSESSMENT/ PLAN:    Therapy to  be:	[x ] Continue   [ ] Discontinued   [ ] Change to prn Analgesics    Documentation and Verification of current medications:  [ X ] Done	[ ] Not done, not eligible, reason:    Comments: Doing OK with epidural and may continue.  Recommend non-opioid adjuvant analgesics to be used when possible and when allowed by primary surgical team.    Progress Note written now but Patient was seen earlier. Anesthesia Pain Management Service: Day _2_ of Epidural    SUBJECTIVE: Patient doing well with PCEA and no problems.  Pain Scale Score:   Refer to charted pain scores    THERAPY:  [x ] Epidural Bupivacaine 0.0625% and Hydromorphone  		[ X] 10 micrograms/mL	[ ] 5 micrograms/mL  [ ] Epidural Bupivacaine 0.0625% and Fentanyl - 2 micrograms/mL  [ ] Epidural Ropivacaine 0.1% plain – 1 mg/mL  [ ] Patient Controlled Regional Anesthesia (PCRA) Ropivacaine  		[ ] 0.2%			[ ] 0.1%    Demand dose __3_ lockout __15_ (minutes) Continuous Rate __6_ Total: _169.9___ ml used (in past 24 hours)      MEDICATIONS  (STANDING):  acetaminophen  IVPB .. 1000 milliGRAM(s) IV Intermittent every 6 hours  ceFAZolin   IVPB 1000 milliGRAM(s) IV Intermittent every 8 hours  heparin   Injectable 5000 Unit(s) SubCutaneous every 8 hours  hydromorphone (10 MICROgram(s)/mL) + bupivacaine 0.0625% in 0.9% Sodium Chloride PCEA 250 milliLiter(s) Epidural PCA Continuous  lactated ringers. 1000 milliLiter(s) (150 mL/Hr) IV Continuous <Continuous>  metoprolol tartrate Injectable 5 milliGRAM(s) IV Push every 6 hours  pantoprazole  Injectable 40 milliGRAM(s) IV Push daily    MEDICATIONS  (PRN):  HYDROmorphone  Injectable 0.5 milliGRAM(s) IV Push every 3 hours PRN Severe breakthrough Pain (7 - 10)  hydromorphone (10 MICROgram(s)/mL) + bupivacaine 0.0625% in 0.9% Sodium Chloride PCEA Rescue Clinician  Bolus 5 milliLiter(s) Epidural every 15 minutes PRN for Pain Score greater than 6  naloxone Injectable 0.1 milliGRAM(s) IV Push every 3 minutes PRN For ANY of the following changes in patient status:  A. RR LESS THAN 10 breaths per minute, B. Oxygen saturation LESS THAN 90%, C. Sedation score of 6  ondansetron Injectable 4 milliGRAM(s) IV Push every 6 hours PRN Nausea  ondansetron Injectable 4 milliGRAM(s) IV Push every 6 hours PRN Nausea and/or Vomiting  senna 2 Tablet(s) Oral at bedtime PRN Constipation      OBJECTIVE:  Pt in bed. NGT in place    Assessment of Catheter Site:	[ ] Left	[ ] Right  [x ] Epidural 	[ ] Femoral	      [ ] Saphenous   [ ] Supraclavicular   [ ] Other:    [x ] Dressing intact	[x ] Site non-tender	[ x] Site without erythema, discharge, edema  [x ] Epidural tubing and connection checked	[x] Gross neurological exam within normal limits  [ ] Catheter removed – tip intact		[ ] Afebrile  	[ ] Febrile: ___   [ X] see Temp under VS below)    PT/INR - ( 04 Sep 2021 09:13 )   PT: 13.4 sec;   INR: 1.18 ratio       PTT - ( 04 Sep 2021 09:13 )  PTT:28.3 sec                      13.8   11.38 )-----------( 201      ( 04 Sep 2021 07:36 )             44.1     Vital Signs Last 24 Hrs  T(C): 37.1 (09-04-21 @ 13:00), Max: 37.2 (09-04-21 @ 09:00)  T(F): 98.7 (09-04-21 @ 13:00), Max: 98.9 (09-04-21 @ 09:00)  HR: 101 (09-04-21 @ 13:00) (94 - 122)  BP: 151/60 (09-04-21 @ 09:00) (142/86 - 161/96)  BP(mean): 108 (09-03-21 @ 20:15) (97 - 108)  RR: 18 (09-04-21 @ 13:00) (12 - 24)  SpO2: 94% (09-04-21 @ 13:00) (91% - 100%)      Sedation Score:	[x ] Alert	[ ] Drowsy	[ ] Arousable	[ ] Asleep	[ ] Unresponsive    Side Effects:	[x ] None	[ ] Nausea	[ ] Vomiting	[ ] Pruritus  		[ ] Weakness		[ ] Numbness	[ ] Other:    ASSESSMENT/ PLAN:    Therapy to  be:	[x ] Continue   [ ] Discontinued   [ ] Change to prn Analgesics    Documentation and Verification of current medications:  [ X ] Done	[ ] Not done, not eligible, reason:    Comments: Doing OK with epidural and may continue.  Recommend non-opioid adjuvant analgesics to be used when possible and when allowed by primary surgical team.    Progress Note written now but Patient was seen earlier.

## 2021-09-05 LAB
ANION GAP SERPL CALC-SCNC: 12 MMOL/L — SIGNIFICANT CHANGE UP (ref 7–14)
BUN SERPL-MCNC: 13 MG/DL — SIGNIFICANT CHANGE UP (ref 7–23)
CALCIUM SERPL-MCNC: 8.8 MG/DL — SIGNIFICANT CHANGE UP (ref 8.4–10.5)
CHLORIDE SERPL-SCNC: 94 MMOL/L — LOW (ref 98–107)
CO2 SERPL-SCNC: 27 MMOL/L — SIGNIFICANT CHANGE UP (ref 22–31)
CREAT SERPL-MCNC: 1.19 MG/DL — SIGNIFICANT CHANGE UP (ref 0.5–1.3)
GLUCOSE BLDC GLUCOMTR-MCNC: 98 MG/DL — SIGNIFICANT CHANGE UP (ref 70–99)
GLUCOSE SERPL-MCNC: 126 MG/DL — HIGH (ref 70–99)
HCT VFR BLD CALC: 42.8 % — SIGNIFICANT CHANGE UP (ref 39–50)
HGB BLD-MCNC: 13.5 G/DL — SIGNIFICANT CHANGE UP (ref 13–17)
MAGNESIUM SERPL-MCNC: 1.9 MG/DL — SIGNIFICANT CHANGE UP (ref 1.6–2.6)
MCHC RBC-ENTMCNC: 28.4 PG — SIGNIFICANT CHANGE UP (ref 27–34)
MCHC RBC-ENTMCNC: 31.5 GM/DL — LOW (ref 32–36)
MCV RBC AUTO: 90.1 FL — SIGNIFICANT CHANGE UP (ref 80–100)
NRBC # BLD: 0 /100 WBCS — SIGNIFICANT CHANGE UP
NRBC # FLD: 0 K/UL — SIGNIFICANT CHANGE UP
PLATELET # BLD AUTO: 199 K/UL — SIGNIFICANT CHANGE UP (ref 150–400)
POTASSIUM SERPL-MCNC: 3.8 MMOL/L — SIGNIFICANT CHANGE UP (ref 3.5–5.3)
POTASSIUM SERPL-SCNC: 3.8 MMOL/L — SIGNIFICANT CHANGE UP (ref 3.5–5.3)
RBC # BLD: 4.75 M/UL — SIGNIFICANT CHANGE UP (ref 4.2–5.8)
RBC # FLD: 15.8 % — HIGH (ref 10.3–14.5)
SODIUM SERPL-SCNC: 133 MMOL/L — LOW (ref 135–145)
WBC # BLD: 12.84 K/UL — HIGH (ref 3.8–10.5)
WBC # FLD AUTO: 12.84 K/UL — HIGH (ref 3.8–10.5)

## 2021-09-05 PROCEDURE — 99232 SBSQ HOSP IP/OBS MODERATE 35: CPT

## 2021-09-05 RX ADMIN — Medication 1000 MILLIGRAM(S): at 14:42

## 2021-09-05 RX ADMIN — Medication 5 MILLIGRAM(S): at 05:24

## 2021-09-05 RX ADMIN — Medication 400 MILLIGRAM(S): at 13:10

## 2021-09-05 RX ADMIN — HEPARIN SODIUM 5000 UNIT(S): 5000 INJECTION INTRAVENOUS; SUBCUTANEOUS at 05:24

## 2021-09-05 RX ADMIN — HEPARIN SODIUM 5000 UNIT(S): 5000 INJECTION INTRAVENOUS; SUBCUTANEOUS at 22:12

## 2021-09-05 RX ADMIN — Medication 5 MILLIGRAM(S): at 17:33

## 2021-09-05 RX ADMIN — Medication 5 MILLIGRAM(S): at 13:11

## 2021-09-05 RX ADMIN — HEPARIN SODIUM 5000 UNIT(S): 5000 INJECTION INTRAVENOUS; SUBCUTANEOUS at 13:11

## 2021-09-05 RX ADMIN — Medication 100 MILLIGRAM(S): at 17:33

## 2021-09-05 RX ADMIN — PANTOPRAZOLE SODIUM 40 MILLIGRAM(S): 20 TABLET, DELAYED RELEASE ORAL at 13:10

## 2021-09-05 RX ADMIN — Medication 100 MILLIGRAM(S): at 08:20

## 2021-09-05 RX ADMIN — Medication 400 MILLIGRAM(S): at 05:23

## 2021-09-05 NOTE — PROGRESS NOTE ADULT - SUBJECTIVE AND OBJECTIVE BOX
Dr. Silvana Stanton  Pager [phone]    PROGRESS NOTE:     Patient is a 50y old  Male who presents with a chief complaint of s/p surgery (04 Sep 2021 11:16)      SUBJECTIVE / OVERNIGHT EVENTS: c/o discomfort with ngt, no flatus yet  ADDITIONAL REVIEW OF SYSTEMS: afebrile, no chest pain/sob    MEDICATIONS  (STANDING):  acetaminophen  IVPB .. 1000 milliGRAM(s) IV Intermittent every 6 hours  ceFAZolin   IVPB 1000 milliGRAM(s) IV Intermittent every 8 hours  heparin   Injectable 5000 Unit(s) SubCutaneous every 8 hours  hydromorphone (10 MICROgram(s)/mL) + bupivacaine 0.0625% in 0.9% Sodium Chloride PCEA 250 milliLiter(s) Epidural PCA Continuous  lactated ringers. 1000 milliLiter(s) (150 mL/Hr) IV Continuous <Continuous>  metoprolol tartrate Injectable 5 milliGRAM(s) IV Push every 6 hours  pantoprazole  Injectable 40 milliGRAM(s) IV Push daily    MEDICATIONS  (PRN):  benzocaine 15 mG/menthol 3.6 mG (Sugar-Free) Lozenge 1 Lozenge Oral three times a day PRN Sore Throat  HYDROmorphone  Injectable 0.5 milliGRAM(s) IV Push every 3 hours PRN Severe breakthrough Pain (7 - 10)  hydromorphone (10 MICROgram(s)/mL) + bupivacaine 0.0625% in 0.9% Sodium Chloride PCEA Rescue Clinician  Bolus 5 milliLiter(s) Epidural every 15 minutes PRN for Pain Score greater than 6  melatonin 3 milliGRAM(s) Oral at bedtime PRN Insomnia  naloxone Injectable 0.1 milliGRAM(s) IV Push every 3 minutes PRN For ANY of the following changes in patient status:  A. RR LESS THAN 10 breaths per minute, B. Oxygen saturation LESS THAN 90%, C. Sedation score of 6  ondansetron Injectable 4 milliGRAM(s) IV Push every 6 hours PRN Nausea and/or Vomiting  senna 2 Tablet(s) Oral at bedtime PRN Constipation      CAPILLARY BLOOD GLUCOSE        I&O's Summary    04 Sep 2021 07:01  -  05 Sep 2021 07:00  --------------------------------------------------------  IN: 2600 mL / OUT: 2820 mL / NET: -220 mL        PHYSICAL EXAM:  Vital Signs Last 24 Hrs  T(C): 36.3 (05 Sep 2021 08:12), Max: 37.3 (04 Sep 2021 17:04)  T(F): 97.4 (05 Sep 2021 08:12), Max: 99.1 (04 Sep 2021 17:04)  HR: 90 (05 Sep 2021 08:12) (90 - 104)  BP: 138/74 (05 Sep 2021 08:12) (138/74 - 176/73)  BP(mean): --  RR: 20 (05 Sep 2021 08:12) (18 - 20)  SpO2: 97% (05 Sep 2021 08:12) (94% - 99%)  CONSTITUTIONAL: NAD, well-developed, obese, NGT in place  RESPIRATORY: Normal respiratory effort; lungs clear, decreased AE  CARDIOVASCULAR: Regular, tachycardic, no murmur  ABDOMEN: soft, incisional tenderness, left chest tube  : shukla in place  MUSCULOSKELETAL: no clubbing or cyanosis of digits; no joint swelling or tenderness to palpation  PSYCH: A+O to person, place, and time; affect appropriate      LABS:                        13.5   12.84 )-----------( 199      ( 05 Sep 2021 07:16 )             42.8     09-05    133<L>  |  94<L>  |  13  ----------------------------<  126<H>  3.8   |  27  |  1.19    Ca    8.8      05 Sep 2021 07:16  Phos  3.9     09-03  Mg     1.90     09-05      PT/INR - ( 04 Sep 2021 09:13 )   PT: 13.4 sec;   INR: 1.18 ratio         PTT - ( 04 Sep 2021 09:13 )  PTT:28.3 sec      RADIOLOGY & ADDITIONAL TESTS:  Results Reviewed:   Imaging Personally Reviewed:  < from: Xray Chest 1 View- PORTABLE-Urgent (Xray Chest 1 View- PORTABLE-Urgent .) (09.04.21 @ 10:56) >  FINDINGS/  IMPRESSION:  Left chest tube again noted. Small left apical pneumothorax.    Feeding tube noted tip under the diaphragm overlying the expected region of the stomach.    Small left pleural effusion    Heart size cannot be accurately assessed in this projection.    Electrocardiogram Personally Reviewed:    COORDINATION OF CARE:  Care Discussed with Consultants/Other Providers [Y/N]:  Prior or Outpatient Records Reviewed [Y/N]:

## 2021-09-05 NOTE — PATIENT PROFILE ADULT - [id number]_NUR
Detail Level: Detailed
Introduction Text (Please End With A Colon): The following procedure was deferred:
No

## 2021-09-05 NOTE — PROGRESS NOTE ADULT - SUBJECTIVE AND OBJECTIVE BOX
Interval events:   no acute events     SUBJECTIVE:  Reports discomfort with NGT. OOB to chair. He is not yet passing flatus. Denies nausea or vomiting.    Objective    Vital signs  T(F): , Max: 99.1 (09-04-21 @ 17:04)  HR: 90 (09-05-21 @ 08:12)  BP: 138/74 (09-05-21 @ 08:12)  SpO2: 97% (09-05-21 @ 08:12)  Wt(kg): --    Output     OUT:    Indwelling Catheter - Urethral (mL): 1350 mL    Nasogastric/Oral tube (mL): 1150 mL  Total OUT: 2500 mL    Total NET: -2500 mL    GEN: NAD, resting quietly  PULM: CT to low continuous wall suction, dressing slightly saturated and changed.   ABD: soft, flank incision c/d/i, NGT secured  : Plummer secured with clear urine     Labs      09-05 @ 07:16    WBC 12.84 / Hct 42.8  / SCr --       09-04 @ 07:45    WBC --    / Hct --    / SCr 1.24

## 2021-09-05 NOTE — PROGRESS NOTE ADULT - SUBJECTIVE AND OBJECTIVE BOX
Anesthesia Pain Management Service: Day 2__ of Epidural    SUBJECTIVE: Patient doing well with PCEA and no problems.  Pain Scale Score:   Refer to charted pain scores    THERAPY:  [x ] Epidural Bupivacaine 0.0625% and Hydromorphone  		[x ] 10 micrograms/mL	[ ] 5 micrograms/mL  [ ] Epidural Bupivacaine 0.0625% and Fentanyl - 2 micrograms/mL  [ ] Epidural Ropivacaine 0.1% plain – 1 mg/mL  [ ] Patient Controlled Regional Anesthesia (PCRA) Ropivacaine  		[ ] 0.2%			[ ] 0.1%    Demand dose __3_ lockout __15_ (minutes) Continuous Rate ___6 Total: __143cc_ Daily      MEDICATIONS  (STANDING):  acetaminophen  IVPB .. 1000 milliGRAM(s) IV Intermittent every 6 hours  ceFAZolin   IVPB 1000 milliGRAM(s) IV Intermittent every 8 hours  heparin   Injectable 5000 Unit(s) SubCutaneous every 8 hours  hydromorphone (10 MICROgram(s)/mL) + bupivacaine 0.0625% in 0.9% Sodium Chloride PCEA 250 milliLiter(s) Epidural PCA Continuous  lactated ringers. 1000 milliLiter(s) (150 mL/Hr) IV Continuous <Continuous>  metoprolol tartrate Injectable 5 milliGRAM(s) IV Push every 6 hours  pantoprazole  Injectable 40 milliGRAM(s) IV Push daily    MEDICATIONS  (PRN):  benzocaine 15 mG/menthol 3.6 mG (Sugar-Free) Lozenge 1 Lozenge Oral three times a day PRN Sore Throat  HYDROmorphone  Injectable 0.5 milliGRAM(s) IV Push every 3 hours PRN Severe breakthrough Pain (7 - 10)  hydromorphone (10 MICROgram(s)/mL) + bupivacaine 0.0625% in 0.9% Sodium Chloride PCEA Rescue Clinician  Bolus 5 milliLiter(s) Epidural every 15 minutes PRN for Pain Score greater than 6  melatonin 3 milliGRAM(s) Oral at bedtime PRN Insomnia  naloxone Injectable 0.1 milliGRAM(s) IV Push every 3 minutes PRN For ANY of the following changes in patient status:  A. RR LESS THAN 10 breaths per minute, B. Oxygen saturation LESS THAN 90%, C. Sedation score of 6  ondansetron Injectable 4 milliGRAM(s) IV Push every 6 hours PRN Nausea and/or Vomiting  senna 2 Tablet(s) Oral at bedtime PRN Constipation      OBJECTIVE:    Assessment of Catheter Site:	[ ] Left	[ ] Right  [x ] Epidural 	[ ] Femoral	      [ ] Saphenous   [ ] Supraclavicular   [ ] Other:    [x ] Dressing intact	[x ] Site non-tender	[ x] Site without erythema, discharge, edema  [x ] Epidural tubing and connection checked	[x] Gross neurological exam within normal limits  [ ] Catheter removed – tip intact		[x ] Afebrile	[ ] Febrile: ___    PT/INR - ( 04 Sep 2021 09:13 )   PT: 13.4 sec;   INR: 1.18 ratio         PTT - ( 04 Sep 2021 09:13 )  PTT:28.3 sec                      13.5   12.84 )-----------( 199      ( 05 Sep 2021 07:16 )             42.8     Vital Signs Last 24 Hrs  T(C): 36.3 (09-05-21 @ 08:12), Max: 37.3 (09-04-21 @ 17:04)  T(F): 97.4 (09-05-21 @ 08:12), Max: 99.1 (09-04-21 @ 17:04)  HR: 90 (09-05-21 @ 08:12) (90 - 104)  BP: 138/74 (09-05-21 @ 08:12) (138/74 - 176/73)  BP(mean): --  RR: 20 (09-05-21 @ 08:12) (18 - 20)  SpO2: 97% (09-05-21 @ 08:12) (94% - 99%)      Sedation Score:	[x ] Alert	[ ] Drowsy	[ ] Arousable	[ ] Asleep	[ ] Unresponsive    Side Effects:	[x ] None	[ ] Nausea	[ ] Vomiting	[ ] Pruritus  		[ ] Weakness		[ ] Numbness	[ ] Other:    ASSESSMENT/ PLAN:    Therapy to  be:	[x ] Continue   [ ] Discontinued   [ ] Change to prn Analgesics    Documentation and Verification of current medications:  [ X ] Done	[ ] Not done, not eligible, reason:    Comments: Doing OK with epidural and may continue.

## 2021-09-05 NOTE — PROGRESS NOTE ADULT - ASSESSMENT
50 year old male s/p Exploratory laparotomy, left open radical nephrectomy, EBL 600ml, NGT, and chest tube in place, some hypotension in the PACU improved after receiving Albumin x2, stable.  9/5 - patient OOB to chair. NGT output around 850 overnight. CT dressing changed at bedside.    -Strict I&O's  -Analgesia via PCEA   -Antiemetics prn  -Continue NGT - for at least one more day since output slightly high  -Continue chest tube  -Ancef  -DVT prophylaxis, Please find SCD machine   -Incentive spirometry  -NPO  -[facility]

## 2021-09-06 LAB
ANION GAP SERPL CALC-SCNC: 10 MMOL/L — SIGNIFICANT CHANGE UP (ref 7–14)
APTT BLD: 28.9 SEC — SIGNIFICANT CHANGE UP (ref 27–36.3)
BUN SERPL-MCNC: 12 MG/DL — SIGNIFICANT CHANGE UP (ref 7–23)
CALCIUM SERPL-MCNC: 8.9 MG/DL — SIGNIFICANT CHANGE UP (ref 8.4–10.5)
CHLORIDE SERPL-SCNC: 96 MMOL/L — LOW (ref 98–107)
CO2 SERPL-SCNC: 29 MMOL/L — SIGNIFICANT CHANGE UP (ref 22–31)
CREAT SERPL-MCNC: 1.07 MG/DL — SIGNIFICANT CHANGE UP (ref 0.5–1.3)
GLUCOSE SERPL-MCNC: 98 MG/DL — SIGNIFICANT CHANGE UP (ref 70–99)
HCT VFR BLD CALC: 42.2 % — SIGNIFICANT CHANGE UP (ref 39–50)
HGB BLD-MCNC: 13.2 G/DL — SIGNIFICANT CHANGE UP (ref 13–17)
INR BLD: 1.31 RATIO — HIGH (ref 0.88–1.16)
MAGNESIUM SERPL-MCNC: 2 MG/DL — SIGNIFICANT CHANGE UP (ref 1.6–2.6)
MCHC RBC-ENTMCNC: 28.1 PG — SIGNIFICANT CHANGE UP (ref 27–34)
MCHC RBC-ENTMCNC: 31.3 GM/DL — LOW (ref 32–36)
MCV RBC AUTO: 89.8 FL — SIGNIFICANT CHANGE UP (ref 80–100)
NRBC # BLD: 0 /100 WBCS — SIGNIFICANT CHANGE UP
NRBC # FLD: 0 K/UL — SIGNIFICANT CHANGE UP
PHOSPHATE SERPL-MCNC: 2.2 MG/DL — LOW (ref 2.5–4.5)
PLATELET # BLD AUTO: 220 K/UL — SIGNIFICANT CHANGE UP (ref 150–400)
POTASSIUM SERPL-MCNC: 4 MMOL/L — SIGNIFICANT CHANGE UP (ref 3.5–5.3)
POTASSIUM SERPL-SCNC: 4 MMOL/L — SIGNIFICANT CHANGE UP (ref 3.5–5.3)
PROTHROM AB SERPL-ACNC: 14.7 SEC — HIGH (ref 10.6–13.6)
RBC # BLD: 4.7 M/UL — SIGNIFICANT CHANGE UP (ref 4.2–5.8)
RBC # FLD: 15.2 % — HIGH (ref 10.3–14.5)
SODIUM SERPL-SCNC: 135 MMOL/L — SIGNIFICANT CHANGE UP (ref 135–145)
WBC # BLD: 11.88 K/UL — HIGH (ref 3.8–10.5)
WBC # FLD AUTO: 11.88 K/UL — HIGH (ref 3.8–10.5)

## 2021-09-06 PROCEDURE — 71045 X-RAY EXAM CHEST 1 VIEW: CPT | Mod: 26

## 2021-09-06 PROCEDURE — 99232 SBSQ HOSP IP/OBS MODERATE 35: CPT

## 2021-09-06 RX ORDER — PHYTONADIONE (VIT K1) 5 MG
5 TABLET ORAL ONCE
Refills: 0 | Status: COMPLETED | OUTPATIENT
Start: 2021-09-06 | End: 2021-09-06

## 2021-09-06 RX ADMIN — Medication 100 MILLIGRAM(S): at 00:50

## 2021-09-06 RX ADMIN — SODIUM CHLORIDE 150 MILLILITER(S): 9 INJECTION, SOLUTION INTRAVENOUS at 00:51

## 2021-09-06 RX ADMIN — Medication 5 MILLIGRAM(S): at 23:51

## 2021-09-06 RX ADMIN — PANTOPRAZOLE SODIUM 40 MILLIGRAM(S): 20 TABLET, DELAYED RELEASE ORAL at 12:10

## 2021-09-06 RX ADMIN — Medication 5 MILLIGRAM(S): at 18:00

## 2021-09-06 RX ADMIN — Medication 5 MILLIGRAM(S): at 06:11

## 2021-09-06 RX ADMIN — Medication 100 MILLIGRAM(S): at 07:45

## 2021-09-06 RX ADMIN — Medication 100 MILLIGRAM(S): at 23:51

## 2021-09-06 RX ADMIN — HEPARIN SODIUM 5000 UNIT(S): 5000 INJECTION INTRAVENOUS; SUBCUTANEOUS at 06:05

## 2021-09-06 RX ADMIN — Medication 5 MILLIGRAM(S): at 12:10

## 2021-09-06 RX ADMIN — HEPARIN SODIUM 5000 UNIT(S): 5000 INJECTION INTRAVENOUS; SUBCUTANEOUS at 23:50

## 2021-09-06 RX ADMIN — HEPARIN SODIUM 5000 UNIT(S): 5000 INJECTION INTRAVENOUS; SUBCUTANEOUS at 15:22

## 2021-09-06 RX ADMIN — Medication 5 MILLIGRAM(S): at 00:42

## 2021-09-06 RX ADMIN — Medication 100 MILLIGRAM(S): at 15:22

## 2021-09-06 NOTE — PROGRESS NOTE ADULT - SUBJECTIVE AND OBJECTIVE BOX
Anesthesia Pain Management Service: Day _4 of Epidural    SUBJECTIVE: Patient doing well with PCEA and no problems.  Pain Scale Score: 4/10   Refer to charted pain scores    THERAPY:  [x ] Epidural Bupivacaine 0.0625% and Hydromorphone  		[ X] 10 micrograms/mL	[ ] 5 micrograms/mL  [ ] Epidural Bupivacaine 0.0625% and Fentanyl - 2 micrograms/mL  [ ] Epidural Ropivacaine 0.1% plain – 1 mg/mL  [ ] Patient Controlled Regional Anesthesia (PCRA) Ropivacaine  		[ ] 0.2%			[ ] 0.1%    Demand dose __3_ lockout __15_ (minutes) Continuous Rate ___ Total: __88.80__ ml used (in past 24 hours)      MEDICATIONS  (STANDING):  ceFAZolin   IVPB 1000 milliGRAM(s) IV Intermittent every 8 hours  heparin   Injectable 5000 Unit(s) SubCutaneous every 8 hours  hydromorphone (10 MICROgram(s)/mL) + bupivacaine 0.0625% in 0.9% Sodium Chloride PCEA 250 milliLiter(s) Epidural PCA Continuous  lactated ringers. 1000 milliLiter(s) (150 mL/Hr) IV Continuous <Continuous>  metoprolol tartrate Injectable 5 milliGRAM(s) IV Push every 6 hours  pantoprazole  Injectable 40 milliGRAM(s) IV Push daily    MEDICATIONS  (PRN):  benzocaine 15 mG/menthol 3.6 mG (Sugar-Free) Lozenge 1 Lozenge Oral three times a day PRN Sore Throat  HYDROmorphone  Injectable 0.5 milliGRAM(s) IV Push every 3 hours PRN Severe breakthrough Pain (7 - 10)  hydromorphone (10 MICROgram(s)/mL) + bupivacaine 0.0625% in 0.9% Sodium Chloride PCEA Rescue Clinician  Bolus 5 milliLiter(s) Epidural every 15 minutes PRN for Pain Score greater than 6  melatonin 3 milliGRAM(s) Oral at bedtime PRN Insomnia  naloxone Injectable 0.1 milliGRAM(s) IV Push every 3 minutes PRN For ANY of the following changes in patient status:  A. RR LESS THAN 10 breaths per minute, B. Oxygen saturation LESS THAN 90%, C. Sedation score of 6  ondansetron Injectable 4 milliGRAM(s) IV Push every 6 hours PRN Nausea and/or Vomiting  senna 2 Tablet(s) Oral at bedtime PRN Constipation      OBJECTIVE:    Assessment of Catheter Site:	[ ] Left	[ ] Right  [x ] Epidural 	[ ] Femoral	      [ ] Saphenous   [ ] Supraclavicular   [ ] Other:    [x ] Dressing intact	[x ] Site non-tender	[ x] Site without erythema, discharge, edema  [x ] Epidural tubing and connection checked	[x] Gross neurological exam within normal limits  [ ] Catheter removed – tip intact		[ ] Afebrile  	[ ] Febrile: ___   [ X] see Temp under VS below)    PT/INR - ( 06 Sep 2021 08:00 )   PT: 14.7 sec;   INR: 1.31 ratio         PTT - ( 06 Sep 2021 08:00 )  PTT:28.9 sec                      13.2   11.88 )-----------( 220      ( 06 Sep 2021 07:17 )             42.2     Vital Signs Last 24 Hrs  T(C): 37 (09-06-21 @ 07:55), Max: 37.1 (09-05-21 @ 20:15)  T(F): 98.6 (09-06-21 @ 07:55), Max: 98.7 (09-05-21 @ 20:15)  HR: 91 (09-06-21 @ 07:55) (81 - 97)  BP: 154/91 (09-06-21 @ 07:55) (153/90 - 159/82)  BP(mean): 108 (09-06-21 @ 07:55) (108 - 108)  RR: 18 (09-06-21 @ 07:55) (16 - 18)  SpO2: 96% (09-06-21 @ 07:55) (94% - 98%)      Sedation Score:	[x ] Alert	[ ] Drowsy	[ ] Arousable	[ ] Asleep	[ ] Unresponsive    Side Effects:	[x ] None	[ ] Nausea	[ ] Vomiting	[ ] Pruritus  		[ ] Weakness		[ ] Numbness	[ ] Other:    ASSESSMENT/ PLAN:    Therapy to  be:	[x ] Continue   [ ] Discontinued   [ ] Change to prn Analgesics    Documentation and Verification of current medications:  [ X ] Done	[ ] Not done, not eligible, reason:    Comments: Doing OK with epidural and may continue.    Progress Note written now but Patient was seen earlier.

## 2021-09-06 NOTE — PROGRESS NOTE ADULT - SUBJECTIVE AND OBJECTIVE BOX
Interval events:   No acute events   NGT output lower overnight    SUBJECTIVE:  Reports discomfort with NGT. OOB to chair. He is not yet passing flatus. Denies nausea or vomiting.    Objective    Vital signs  T(F): , Max: 98.7 (09-05-21 @ 20:15)  HR: 81 (09-06-21 @ 07:37)  BP: 159/82 (09-06-21 @ 06:16)  SpO2: 98% (09-06-21 @ 07:37)  Wt(kg): --    Output     OUT:    Indwelling Catheter - Urethral (mL): 1550 mL    Nasogastric/Oral tube (mL): 600 mL  Total OUT: 2150 mL    Total NET: -2150 mL    GEN: NAD, resting quietly  PULM: CT to low continuous wall suction, dressing slightly saturated and changed.   ABD: soft, flank incision c/d/i, NGT removed  : Plummer secured with clear urine     Labs      09-05 @ 07:16    WBC 12.84 / Hct 42.8  / SCr 1.19

## 2021-09-06 NOTE — PROGRESS NOTE ADULT - SUBJECTIVE AND OBJECTIVE BOX
Anesthesia Pain Management Service: Day 4__ of Epidural    SUBJECTIVE: Patient doing well with PCEA and no problems.  Pain Scale Score: 4/10  Refer to charted pain scores    THERAPY:  [x ] Epidural Bupivacaine 0.0625% and Hydromorphone  		[ X] 10 micrograms/mL	[ ] 5 micrograms/mL  [ ] Epidural Bupivacaine 0.0625% and Fentanyl - 2 micrograms/mL  [ ] Epidural Ropivacaine 0.1% plain – 1 mg/mL  [ ] Patient Controlled Regional Anesthesia (PCRA) Ropivacaine  		[ ] 0.2%			[ ] 0.1%    Demand dose __3_ lockout __15_ (minutes) Continuous Rate __6_ Total: _88.80___ ml used (in past 24 hours)      MEDICATIONS  (STANDING):  ceFAZolin   IVPB 1000 milliGRAM(s) IV Intermittent every 8 hours  heparin   Injectable 5000 Unit(s) SubCutaneous every 8 hours  hydromorphone (10 MICROgram(s)/mL) + bupivacaine 0.0625% in 0.9% Sodium Chloride PCEA 250 milliLiter(s) Epidural PCA Continuous  lactated ringers. 1000 milliLiter(s) (150 mL/Hr) IV Continuous <Continuous>  metoprolol tartrate Injectable 5 milliGRAM(s) IV Push every 6 hours  pantoprazole  Injectable 40 milliGRAM(s) IV Push daily  phytonadione   Solution 5 milliGRAM(s) Oral once    MEDICATIONS  (PRN):  benzocaine 15 mG/menthol 3.6 mG (Sugar-Free) Lozenge 1 Lozenge Oral three times a day PRN Sore Throat  HYDROmorphone  Injectable 0.5 milliGRAM(s) IV Push every 3 hours PRN Severe breakthrough Pain (7 - 10)  hydromorphone (10 MICROgram(s)/mL) + bupivacaine 0.0625% in 0.9% Sodium Chloride PCEA Rescue Clinician  Bolus 5 milliLiter(s) Epidural every 15 minutes PRN for Pain Score greater than 6  melatonin 3 milliGRAM(s) Oral at bedtime PRN Insomnia  naloxone Injectable 0.1 milliGRAM(s) IV Push every 3 minutes PRN For ANY of the following changes in patient status:  A. RR LESS THAN 10 breaths per minute, B. Oxygen saturation LESS THAN 90%, C. Sedation score of 6  ondansetron Injectable 4 milliGRAM(s) IV Push every 6 hours PRN Nausea and/or Vomiting  senna 2 Tablet(s) Oral at bedtime PRN Constipation      OBJECTIVE:  Patient is sitting up in the chair.    Assessment of Catheter Site:	[ ] Left	[ ] Right  [x ] Epidural 	[ ] Femoral	      [ ] Saphenous   [ ] Supraclavicular   [ ] Other:    [x ] Dressing intact	[x ] Site non-tender	[ x] Site without erythema, discharge, edema  [x ] Epidural tubing and connection checked	[x] Gross neurological exam within normal limits  [ ] Catheter removed – tip intact		[ ] Afebrile  	[ ] Febrile: ___   [ X] see Temp under VS below)    PT/INR - ( 06 Sep 2021 08:00 )   PT: 14.7 sec;   INR: 1.31 ratio         PTT - ( 06 Sep 2021 08:00 )  PTT:28.9 sec                      13.2   11.88 )-----------( 220      ( 06 Sep 2021 07:17 )             42.2     Vital Signs Last 24 Hrs  T(C): 37 (09-06-21 @ 07:55), Max: 37.1 (09-05-21 @ 20:15)  T(F): 98.6 (09-06-21 @ 07:55), Max: 98.7 (09-05-21 @ 20:15)  HR: 91 (09-06-21 @ 07:55) (81 - 97)  BP: 154/91 (09-06-21 @ 07:55) (153/90 - 159/82)  BP(mean): 108 (09-06-21 @ 07:55) (108 - 108)  RR: 18 (09-06-21 @ 07:55) (16 - 18)  SpO2: 96% (09-06-21 @ 07:55) (94% - 98%)      Sedation Score:	[x ] Alert	[ ] Drowsy	[ ] Arousable	[ ] Asleep	[ ] Unresponsive    Side Effects:	[x ] None	[ ] Nausea	[ ] Vomiting	[ ] Pruritus  		[ ] Weakness		[ ] Numbness	[ ] Other:    ASSESSMENT/ PLAN:    Therapy to  be:	[x ] Continue   [ ] Discontinued   [ ] Change to prn Analgesics    Documentation and Verification of current medications:  [ X ] Done	[ ] Not done, not eligible, reason:    Comments: Doing OK with epidural and may continue.  Patient's INR is slightly elevated, discussed patient with team and recommended po Vit K 5mg x1.  Patient is for removable of epidural tomorrow. Recommend non-opioid adjuvant analgesics to be used when possible and when allowed by primary surgical team.    Progress Note written now but Patient was seen earlier.

## 2021-09-06 NOTE — PROGRESS NOTE ADULT - SUBJECTIVE AND OBJECTIVE BOX
Dr. Silvana Stanton  Pager [phone]    PROGRESS NOTE:     Patient is a 50y old  Male who presents with a chief complaint of s/p surgery (05 Sep 2021 10:52)      SUBJECTIVE / OVERNIGHT EVENTS: pt denies chest pain or sob , ngt removed , no flatus yet  ADDITIONAL REVIEW OF SYSTEMS: afebrile , pain controlled    MEDICATIONS  (STANDING):  ceFAZolin   IVPB 1000 milliGRAM(s) IV Intermittent every 8 hours  heparin   Injectable 5000 Unit(s) SubCutaneous every 8 hours  hydromorphone (10 MICROgram(s)/mL) + bupivacaine 0.0625% in 0.9% Sodium Chloride PCEA 250 milliLiter(s) Epidural PCA Continuous  lactated ringers. 1000 milliLiter(s) (150 mL/Hr) IV Continuous <Continuous>  metoprolol tartrate Injectable 5 milliGRAM(s) IV Push every 6 hours  pantoprazole  Injectable 40 milliGRAM(s) IV Push daily  phytonadione   Solution 5 milliGRAM(s) Oral once    MEDICATIONS  (PRN):  benzocaine 15 mG/menthol 3.6 mG (Sugar-Free) Lozenge 1 Lozenge Oral three times a day PRN Sore Throat  HYDROmorphone  Injectable 0.5 milliGRAM(s) IV Push every 3 hours PRN Severe breakthrough Pain (7 - 10)  hydromorphone (10 MICROgram(s)/mL) + bupivacaine 0.0625% in 0.9% Sodium Chloride PCEA Rescue Clinician  Bolus 5 milliLiter(s) Epidural every 15 minutes PRN for Pain Score greater than 6  melatonin 3 milliGRAM(s) Oral at bedtime PRN Insomnia  naloxone Injectable 0.1 milliGRAM(s) IV Push every 3 minutes PRN For ANY of the following changes in patient status:  A. RR LESS THAN 10 breaths per minute, B. Oxygen saturation LESS THAN 90%, C. Sedation score of 6  ondansetron Injectable 4 milliGRAM(s) IV Push every 6 hours PRN Nausea and/or Vomiting  senna 2 Tablet(s) Oral at bedtime PRN Constipation      CAPILLARY BLOOD GLUCOSE      POCT Blood Glucose.: 98 mg/dL (05 Sep 2021 17:31)    I&O's Summary    05 Sep 2021 07:01  -  06 Sep 2021 07:00  --------------------------------------------------------  IN: 3570 mL / OUT: 2270 mL / NET: 1300 mL    06 Sep 2021 07:01  -  06 Sep 2021 11:58  --------------------------------------------------------  IN: 200 mL / OUT: 100 mL / NET: 100 mL        PHYSICAL EXAM:  Vital Signs Last 24 Hrs  T(C): 37 (06 Sep 2021 07:55), Max: 37.1 (05 Sep 2021 20:15)  T(F): 98.6 (06 Sep 2021 07:55), Max: 98.7 (05 Sep 2021 20:15)  HR: 91 (06 Sep 2021 07:55) (81 - 97)  BP: 154/91 (06 Sep 2021 07:55) (153/90 - 159/82)  BP(mean): 108 (06 Sep 2021 07:55) (108 - 108)  RR: 18 (06 Sep 2021 07:55) (16 - 18)  SpO2: 96% (06 Sep 2021 07:55) (94% - 98%)  CONSTITUTIONAL: NAD, well-developed, obese, ngt removed  RESPIRATORY: Normal respiratory effort; lungs clear, decreased AE  CARDIOVASCULAR: Regular,  no murmur  ABDOMEN: soft, left flank incision wound clean, left chest tube in place  : shukla in place  MUSCULOSKELETAL: no clubbing or cyanosis of digits; no joint swelling or tenderness to palpation  PSYCH: A+O to person, place, and time; affect appropriate      LABS:                        13.2   11.88 )-----------( 220      ( 06 Sep 2021 07:17 )             42.2     09-06    135  |  96<L>  |  12  ----------------------------<  98  4.0   |  29  |  1.07    Ca    8.9      06 Sep 2021 07:17  Phos  2.2     09-06  Mg     2.00     09-06      PT/INR - ( 06 Sep 2021 08:00 )   PT: 14.7 sec;   INR: 1.31 ratio         PTT - ( 06 Sep 2021 08:00 )  PTT:28.9 sec      RADIOLOGY & ADDITIONAL TESTS:  Results Reviewed:   Imaging Personally Reviewed:  < from: Xray Chest 1 View- PORTABLE-Urgent (Xray Chest 1 View- PORTABLE-Urgent .) (09.06.21 @ 10:18) >    IMPRESSION:    No definite pneumothorax    Electrocardiogram Personally Reviewed:    COORDINATION OF CARE:  Care Discussed with Consultants/Other Providers [Y/N]:  Prior or Outpatient Records Reviewed [Y/N]:

## 2021-09-06 NOTE — PROGRESS NOTE ADULT - ASSESSMENT
50 year old male s/p Exploratory laparotomy, left open radical nephrectomy, EBL 600ml, NGT, and chest tube in place, some hypotension in the PACU improved after receiving Albumin x2, stable.  9/5 - patient OOB to chair. NGT output around 850 overnight. CT dressing changed at bedside.    9/6 - patient OOB to chair. NGT output 10 overnight. Removed this morning. CT setting changed to water seal.   -CBC, BMP, PT,PTT and INR this AM  -Strict I&O's  -Analgesia via PCEA   -Antiemetics prn  -Removed NGT  -Chest tube set to water seal. Will get CXR later this morning and remove chest tube  -Remove PCEA after chest tube is out  -Ancef  -DVT prophylaxis,  -Incentive spirometry  -NPO with chips  -OOB

## 2021-09-07 LAB
ANION GAP SERPL CALC-SCNC: 16 MMOL/L — HIGH (ref 7–14)
ANION GAP SERPL CALC-SCNC: 9 MMOL/L — SIGNIFICANT CHANGE UP (ref 7–14)
APTT BLD: 27.5 SEC — SIGNIFICANT CHANGE UP (ref 27–36.3)
APTT BLD: 28.6 SEC — SIGNIFICANT CHANGE UP (ref 27–36.3)
BLD GP AB SCN SERPL QL: NEGATIVE — SIGNIFICANT CHANGE UP
BUN SERPL-MCNC: 14 MG/DL — SIGNIFICANT CHANGE UP (ref 7–23)
BUN SERPL-MCNC: 9 MG/DL — SIGNIFICANT CHANGE UP (ref 7–23)
CALCIUM SERPL-MCNC: 7.3 MG/DL — LOW (ref 8.4–10.5)
CALCIUM SERPL-MCNC: 8.7 MG/DL — SIGNIFICANT CHANGE UP (ref 8.4–10.5)
CHLORIDE SERPL-SCNC: 95 MMOL/L — LOW (ref 98–107)
CHLORIDE SERPL-SCNC: 98 MMOL/L — SIGNIFICANT CHANGE UP (ref 98–107)
CO2 SERPL-SCNC: 19 MMOL/L — LOW (ref 22–31)
CO2 SERPL-SCNC: 31 MMOL/L — SIGNIFICANT CHANGE UP (ref 22–31)
CREAT SERPL-MCNC: 0.66 MG/DL — SIGNIFICANT CHANGE UP (ref 0.5–1.3)
CREAT SERPL-MCNC: 1.1 MG/DL — SIGNIFICANT CHANGE UP (ref 0.5–1.3)
GLUCOSE SERPL-MCNC: 62 MG/DL — LOW (ref 70–99)
GLUCOSE SERPL-MCNC: 98 MG/DL — SIGNIFICANT CHANGE UP (ref 70–99)
HCT VFR BLD CALC: 28.1 % — LOW (ref 39–50)
HCT VFR BLD CALC: 42.5 % — SIGNIFICANT CHANGE UP (ref 39–50)
HGB BLD-MCNC: 13.6 G/DL — SIGNIFICANT CHANGE UP (ref 13–17)
HGB BLD-MCNC: 8.6 G/DL — LOW (ref 13–17)
INR BLD: 1.1 RATIO — SIGNIFICANT CHANGE UP (ref 0.88–1.16)
INR BLD: 1.26 RATIO — HIGH (ref 0.88–1.16)
MAGNESIUM SERPL-MCNC: 1.2 MG/DL — LOW (ref 1.6–2.6)
MCHC RBC-ENTMCNC: 27.7 PG — SIGNIFICANT CHANGE UP (ref 27–34)
MCHC RBC-ENTMCNC: 27.7 PG — SIGNIFICANT CHANGE UP (ref 27–34)
MCHC RBC-ENTMCNC: 30.6 GM/DL — LOW (ref 32–36)
MCHC RBC-ENTMCNC: 32 GM/DL — SIGNIFICANT CHANGE UP (ref 32–36)
MCV RBC AUTO: 86.6 FL — SIGNIFICANT CHANGE UP (ref 80–100)
MCV RBC AUTO: 90.4 FL — SIGNIFICANT CHANGE UP (ref 80–100)
NRBC # BLD: 0 /100 WBCS — SIGNIFICANT CHANGE UP
NRBC # BLD: 0 /100 WBCS — SIGNIFICANT CHANGE UP
NRBC # FLD: 0 K/UL — SIGNIFICANT CHANGE UP
NRBC # FLD: 0 K/UL — SIGNIFICANT CHANGE UP
PHOSPHATE SERPL-MCNC: 1.6 MG/DL — LOW (ref 2.5–4.5)
PLATELET # BLD AUTO: 147 K/UL — LOW (ref 150–400)
PLATELET # BLD AUTO: 242 K/UL — SIGNIFICANT CHANGE UP (ref 150–400)
POTASSIUM SERPL-MCNC: 4.1 MMOL/L — SIGNIFICANT CHANGE UP (ref 3.5–5.3)
POTASSIUM SERPL-MCNC: 4.2 MMOL/L — SIGNIFICANT CHANGE UP (ref 3.5–5.3)
POTASSIUM SERPL-SCNC: 4.1 MMOL/L — SIGNIFICANT CHANGE UP (ref 3.5–5.3)
POTASSIUM SERPL-SCNC: 4.2 MMOL/L — SIGNIFICANT CHANGE UP (ref 3.5–5.3)
PROTHROM AB SERPL-ACNC: 12.6 SEC — SIGNIFICANT CHANGE UP (ref 10.6–13.6)
PROTHROM AB SERPL-ACNC: 14.3 SEC — HIGH (ref 10.6–13.6)
RBC # BLD: 3.11 M/UL — LOW (ref 4.2–5.8)
RBC # BLD: 4.91 M/UL — SIGNIFICANT CHANGE UP (ref 4.2–5.8)
RBC # FLD: 14.8 % — HIGH (ref 10.3–14.5)
RBC # FLD: 14.8 % — HIGH (ref 10.3–14.5)
RH IG SCN BLD-IMP: POSITIVE — SIGNIFICANT CHANGE UP
SODIUM SERPL-SCNC: 133 MMOL/L — LOW (ref 135–145)
SODIUM SERPL-SCNC: 135 MMOL/L — SIGNIFICANT CHANGE UP (ref 135–145)
WBC # BLD: 7.01 K/UL — SIGNIFICANT CHANGE UP (ref 3.8–10.5)
WBC # BLD: 9.9 K/UL — SIGNIFICANT CHANGE UP (ref 3.8–10.5)
WBC # FLD AUTO: 7.01 K/UL — SIGNIFICANT CHANGE UP (ref 3.8–10.5)
WBC # FLD AUTO: 9.9 K/UL — SIGNIFICANT CHANGE UP (ref 3.8–10.5)

## 2021-09-07 PROCEDURE — 71045 X-RAY EXAM CHEST 1 VIEW: CPT | Mod: 26

## 2021-09-07 PROCEDURE — 99232 SBSQ HOSP IP/OBS MODERATE 35: CPT

## 2021-09-07 RX ORDER — HYDROMORPHONE HYDROCHLORIDE 2 MG/ML
0.5 INJECTION INTRAMUSCULAR; INTRAVENOUS; SUBCUTANEOUS
Refills: 0 | Status: DISCONTINUED | OUTPATIENT
Start: 2021-09-07 | End: 2021-09-08

## 2021-09-07 RX ORDER — PANTOPRAZOLE SODIUM 20 MG/1
40 TABLET, DELAYED RELEASE ORAL
Refills: 0 | Status: DISCONTINUED | OUTPATIENT
Start: 2021-09-07 | End: 2021-09-08

## 2021-09-07 RX ORDER — OXYCODONE HYDROCHLORIDE 5 MG/1
10 TABLET ORAL
Refills: 0 | Status: DISCONTINUED | OUTPATIENT
Start: 2021-09-07 | End: 2021-09-08

## 2021-09-07 RX ORDER — OXYCODONE HYDROCHLORIDE 5 MG/1
5 TABLET ORAL
Refills: 0 | Status: DISCONTINUED | OUTPATIENT
Start: 2021-09-07 | End: 2021-09-08

## 2021-09-07 RX ORDER — LOSARTAN POTASSIUM 100 MG/1
100 TABLET, FILM COATED ORAL DAILY
Refills: 0 | Status: DISCONTINUED | OUTPATIENT
Start: 2021-09-07 | End: 2021-09-08

## 2021-09-07 RX ORDER — ACETAMINOPHEN 500 MG
650 TABLET ORAL EVERY 6 HOURS
Refills: 0 | Status: DISCONTINUED | OUTPATIENT
Start: 2021-09-07 | End: 2021-09-08

## 2021-09-07 RX ADMIN — SODIUM CHLORIDE 100 MILLILITER(S): 9 INJECTION, SOLUTION INTRAVENOUS at 21:31

## 2021-09-07 RX ADMIN — Medication 650 MILLIGRAM(S): at 23:03

## 2021-09-07 RX ADMIN — Medication 650 MILLIGRAM(S): at 12:43

## 2021-09-07 RX ADMIN — Medication 5 MILLIGRAM(S): at 05:51

## 2021-09-07 RX ADMIN — OXYCODONE HYDROCHLORIDE 10 MILLIGRAM(S): 5 TABLET ORAL at 22:00

## 2021-09-07 RX ADMIN — Medication 650 MILLIGRAM(S): at 17:58

## 2021-09-07 RX ADMIN — Medication 100 MILLIGRAM(S): at 18:11

## 2021-09-07 RX ADMIN — LOSARTAN POTASSIUM 100 MILLIGRAM(S): 100 TABLET, FILM COATED ORAL at 11:41

## 2021-09-07 RX ADMIN — OXYCODONE HYDROCHLORIDE 10 MILLIGRAM(S): 5 TABLET ORAL at 17:55

## 2021-09-07 RX ADMIN — OXYCODONE HYDROCHLORIDE 10 MILLIGRAM(S): 5 TABLET ORAL at 21:31

## 2021-09-07 RX ADMIN — Medication 100 MILLIGRAM(S): at 23:03

## 2021-09-07 RX ADMIN — PANTOPRAZOLE SODIUM 40 MILLIGRAM(S): 20 TABLET, DELAYED RELEASE ORAL at 11:15

## 2021-09-07 RX ADMIN — Medication 5 MILLIGRAM(S): at 11:15

## 2021-09-07 RX ADMIN — HEPARIN SODIUM 5000 UNIT(S): 5000 INJECTION INTRAVENOUS; SUBCUTANEOUS at 21:31

## 2021-09-07 RX ADMIN — Medication 100 MILLIGRAM(S): at 09:00

## 2021-09-07 RX ADMIN — Medication 650 MILLIGRAM(S): at 11:41

## 2021-09-07 RX ADMIN — OXYCODONE HYDROCHLORIDE 10 MILLIGRAM(S): 5 TABLET ORAL at 17:25

## 2021-09-07 RX ADMIN — Medication 650 MILLIGRAM(S): at 17:28

## 2021-09-07 RX ADMIN — HEPARIN SODIUM 5000 UNIT(S): 5000 INJECTION INTRAVENOUS; SUBCUTANEOUS at 18:12

## 2021-09-07 NOTE — PROGRESS NOTE ADULT - SUBJECTIVE AND OBJECTIVE BOX
Anesthesia Pain Management Service: Day _5_ of Epidural    SUBJECTIVE: Patient doing well with PCEA and no problems.  Pain Scale Score:  0/10 Refer to charted pain scores    THERAPY:  [x ] Epidural Bupivacaine 0.0625% and Hydromorphone  		[X ] 10 micrograms/mL	[ ] 5 micrograms/mL  [ ] Epidural Bupivacaine 0.0625% and Fentanyl - 2 micrograms/mL  [ ] Epidural Ropivacaine 0.1% plain – 1 mg/mL  [ ] Patient Controlled Regional Anesthesia (PCRA) Ropivacaine  		[ ] 0.2%			[ ] 0.1%    Demand dose __3_ lockout __15_ (minutes) Continuous Rate _6__ Total: 96.20__ Daily      MEDICATIONS  (STANDING):  acetaminophen   Tablet .. 650 milliGRAM(s) Oral every 6 hours  ceFAZolin   IVPB 1000 milliGRAM(s) IV Intermittent every 8 hours  heparin   Injectable 5000 Unit(s) SubCutaneous every 8 hours  lactated ringers. 1000 milliLiter(s) (150 mL/Hr) IV Continuous <Continuous>  metoprolol tartrate Injectable 5 milliGRAM(s) IV Push every 6 hours  pantoprazole  Injectable 40 milliGRAM(s) IV Push daily    MEDICATIONS  (PRN):  benzocaine 15 mG/menthol 3.6 mG (Sugar-Free) Lozenge 1 Lozenge Oral three times a day PRN Sore Throat  HYDROmorphone  Injectable 0.5 milliGRAM(s) IV Push every 3 hours PRN Severe breakthrough Pain (7 - 10)  melatonin 3 milliGRAM(s) Oral at bedtime PRN Insomnia  naloxone Injectable 0.1 milliGRAM(s) IV Push every 3 minutes PRN For ANY of the following changes in patient status:  A. RR LESS THAN 10 breaths per minute, B. Oxygen saturation LESS THAN 90%, C. Sedation score of 6  ondansetron Injectable 4 milliGRAM(s) IV Push every 6 hours PRN Nausea and/or Vomiting  oxyCODONE    IR 5 milliGRAM(s) Oral every 3 hours PRN Moderate Pain (4 - 6)  oxyCODONE    IR 10 milliGRAM(s) Oral every 3 hours PRN Severe Pain (7 - 10)  senna 2 Tablet(s) Oral at bedtime PRN Constipation      OBJECTIVE:  Patient is sitting up in chair.    Assessment of Catheter Site:	[ ] Left	[ ] Right  [x ] Epidural 	[ ] Femoral	      [ ] Saphenous   [ ] Supraclavicular   [ ] Other:    [x ] Dressing intact	[x ] Site non-tender	[ x] Site without erythema, discharge, edema  [x ] Epidural tubing and connection checked	[x] Gross neurological exam within normal limits  [X ] Catheter removed – tip intact		[ ] Afebrile	  [ ] Febrile: ___       [ X] see Temp under VS below)    PT/INR - ( 07 Sep 2021 09:41 )   PT: 12.6 sec;   INR: 1.10 ratio         PTT - ( 07 Sep 2021 09:41 )  PTT:28.6 sec                      13.6   9.90  )-----------( 242      ( 07 Sep 2021 09:41 )             42.5     Vital Signs Last 24 Hrs  T(C): 36.7 (09-07-21 @ 09:39), Max: 37 (09-06-21 @ 23:45)  T(F): 98 (09-07-21 @ 09:39), Max: 98.6 (09-06-21 @ 23:45)  HR: 85 (09-07-21 @ 09:39) (78 - 91)  BP: 163/85 (09-07-21 @ 09:39) (149/83 - 171/81)  BP(mean): 99 (09-06-21 @ 17:59) (99 - 100)  RR: 18 (09-07-21 @ 09:39) (16 - 18)  SpO2: 98% (09-07-21 @ 09:39) (95% - 99%)      Sedation Score:	[x ] Alert	[ ] Drowsy	[ ] Arousable	[ ] Asleep	[ ] Unresponsive    Side Effects:	[x ] None	[ ] Nausea	[ ] Vomiting	[ ] Pruritus  		[ ] Weakness		[ ] Numbness	[ ] Other:    ASSESSMENT/ PLAN:    Therapy to  be:	[ ] Continue   [ X] Discontinued   [ X] Change to prn Analgesics    Documentation and Verification of current medications:  [ X ] Done	[ ] Not done, not eligible, reason:    Comments: Discussed patient with team this morning. PCEA discontinued and changed to IV/oral opioid and/or non-opioid Adjuvant analgesics to be used at this point.    Progress Note written now but Patient was seen earlier.

## 2021-09-07 NOTE — PROGRESS NOTE ADULT - SUBJECTIVE AND OBJECTIVE BOX
Anesthesia Pain Management Service    SUBJECTIVE: Pt doing well with PCEA removed & no problems reported.    Therapy:	  [ ] IV PCA	   [ X] Epidural stopped          [ ] s/p Spinal Opoid              [ ] Postpartum infusion	  [ ] Patient controlled regional anesthesia (PCRA)    [ ] prn Analgesics    Allergies    No Known Allergies    Intolerances      MEDICATIONS  (STANDING):  acetaminophen   Tablet .. 650 milliGRAM(s) Oral every 6 hours  ceFAZolin   IVPB 1000 milliGRAM(s) IV Intermittent every 8 hours  heparin   Injectable 5000 Unit(s) SubCutaneous every 8 hours  lactated ringers. 1000 milliLiter(s) (100 mL/Hr) IV Continuous <Continuous>  losartan 100 milliGRAM(s) Oral daily  pantoprazole    Tablet 40 milliGRAM(s) Oral before breakfast    MEDICATIONS  (PRN):  benzocaine 15 mG/menthol 3.6 mG (Sugar-Free) Lozenge 1 Lozenge Oral three times a day PRN Sore Throat  HYDROmorphone  Injectable 0.5 milliGRAM(s) IV Push every 3 hours PRN Severe breakthrough Pain (7 - 10)  melatonin 3 milliGRAM(s) Oral at bedtime PRN Insomnia  naloxone Injectable 0.1 milliGRAM(s) IV Push every 3 minutes PRN For ANY of the following changes in patient status:  A. RR LESS THAN 10 breaths per minute, B. Oxygen saturation LESS THAN 90%, C. Sedation score of 6  ondansetron Injectable 4 milliGRAM(s) IV Push every 6 hours PRN Nausea and/or Vomiting  oxyCODONE    IR 5 milliGRAM(s) Oral every 3 hours PRN Moderate Pain (4 - 6)  oxyCODONE    IR 10 milliGRAM(s) Oral every 3 hours PRN Severe Pain (7 - 10)  senna 2 Tablet(s) Oral at bedtime PRN Constipation      OBJECTIVE:   [X] No new signs     [ ] Other:    Side Effects:  [X ] None			[ ] Other:    Assessment of Catheter Site:		[ ] Intact		[ ] Other:    ASSESSMENT/PLAN  [ ] Continue current therapy    [X ] Therapy changed to:    [ ] IV PCA       [ ] Epidural     [ X] prn Analgesics     Comments: Epidural removed & oral/IV opioids and/or non-opioid Adjuvant analgesics to be used at this point.     Progress Note written now but Patient was seen earlier.

## 2021-09-07 NOTE — PROGRESS NOTE ADULT - SUBJECTIVE AND OBJECTIVE BOX
[facility] Division of Hospital Medicine  Margaret Ordonez MD  Pager (M-F, 8A-5P):  In-house pager [phone]; Long-range pager [phone]  Other Times:  Please page Hospitalist in Charge -  In-house pager [phone]    Patient is a 50y old  Male who presents with a chief complaint of s/p surgery (06 Sep 2021 11:58)      SUBJECTIVE / OVERNIGHT EVENTS: No problems reported over night. Pt sitting up in chair, doing better.  Pain when moves/takes deep breath at surgical site.  +flatus since last night, starting to feel a little hungry, tolerating ice chips, been up walking in [place] without problem.  ADDITIONAL REVIEW OF SYSTEMS:    MEDICATIONS  (STANDING):  ceFAZolin   IVPB 1000 milliGRAM(s) IV Intermittent every 8 hours  heparin   Injectable 5000 Unit(s) SubCutaneous every 8 hours  hydromorphone (10 MICROgram(s)/mL) + bupivacaine 0.0625% in 0.9% Sodium Chloride PCEA 250 milliLiter(s) Epidural PCA Continuous  lactated ringers. 1000 milliLiter(s) (150 mL/Hr) IV Continuous <Continuous>  metoprolol tartrate Injectable 5 milliGRAM(s) IV Push every 6 hours  pantoprazole  Injectable 40 milliGRAM(s) IV Push daily    MEDICATIONS  (PRN):  benzocaine 15 mG/menthol 3.6 mG (Sugar-Free) Lozenge 1 Lozenge Oral three times a day PRN Sore Throat  HYDROmorphone  Injectable 0.5 milliGRAM(s) IV Push every 3 hours PRN Severe breakthrough Pain (7 - 10)  hydromorphone (10 MICROgram(s)/mL) + bupivacaine 0.0625% in 0.9% Sodium Chloride PCEA Rescue Clinician  Bolus 5 milliLiter(s) Epidural every 15 minutes PRN for Pain Score greater than 6  melatonin 3 milliGRAM(s) Oral at bedtime PRN Insomnia  naloxone Injectable 0.1 milliGRAM(s) IV Push every 3 minutes PRN For ANY of the following changes in patient status:  A. RR LESS THAN 10 breaths per minute, B. Oxygen saturation LESS THAN 90%, C. Sedation score of 6  ondansetron Injectable 4 milliGRAM(s) IV Push every 6 hours PRN Nausea and/or Vomiting  senna 2 Tablet(s) Oral at bedtime PRN Constipation      CAPILLARY BLOOD GLUCOSE        I&O's Summary    06 Sep 2021 07:01  -  07 Sep 2021 07:00  --------------------------------------------------------  IN: 3700 mL / OUT: 1700 mL / NET: 2000 mL        PHYSICAL EXAM:  Vital Signs Last 24 Hrs  T(C): 36.4 (07 Sep 2021 05:50), Max: 37 (06 Sep 2021 23:45)  T(F): 97.5 (07 Sep 2021 05:50), Max: 98.6 (06 Sep 2021 23:45)  HR: 78 (07 Sep 2021 09:02) (78 - 91)  BP: 155/83 (07 Sep 2021 05:50) (149/83 - 171/81)  BP(mean): 99 (06 Sep 2021 17:59) (99 - 100)  RR: 18 (07 Sep 2021 05:50) (16 - 18)  SpO2: 97% (07 Sep 2021 09:02) (95% - 99%)    CONSTITUTIONAL: obese WM, sitting up in chair, NAD  RESPIRATORY: Normal respiratory effort; lungs clear, decreased AE  CARDIOVASCULAR: RRR  ABDOMEN: soft, left flank incision wound clean, previous chest tube site covered with dressing  : + shukla   MUSCULOSKELETAL: no clubbing or cyanosis of digits; no joint swelling or tenderness to palpation  PSYCH: A+O to person, place, and time; affect appropriate    LABS:                        8.6    7.01  )-----------( 147      ( 07 Sep 2021 06:46 )             28.1     09-07    133<L>  |  98  |  9   ----------------------------<  62<L>  4.1   |  19<L>  |  0.66    Ca    7.3<L>      07 Sep 2021 06:46  Phos  1.6     09-07  Mg     1.20     09-07      PT/INR - ( 07 Sep 2021 06:46 )   PT: 14.3 sec;   INR: 1.26 ratio    PTT - ( 07 Sep 2021 06:46 )  PTT:27.5 sec    RADIOLOGY & ADDITIONAL TESTS:  Results Reviewed:   Imaging Personally Reviewed:  Electrocardiogram Personally Reviewed:    COORDINATION OF CARE:  Care Discussed with Consultants/Other Providers [Y/N]: urology re overall care   Prior or Outpatient Records Reviewed [Y/N]:

## 2021-09-07 NOTE — PROGRESS NOTE ADULT - ASSESSMENT
50 year old male s/p Exploratory laparotomy, left open radical nephrectomy, EBL 600ml, NGT, and chest tube in place, some hypotension in the PACU improved after receiving Albumin x2, stable.  9/5 - patient OOB to chair. NGT output around 850 overnight. CT dressing changed at bedside.    9/6 - patient OOB to chair. NGT output 10 overnight. Removed this morning. CT setting changed to water seal, CT removed  9/7 - pt feels well, ambulating without difficulty, no CP, SOB, + flatus, no N/V, no BM yet,     -repeat labs stat  -f/u CXR  -NPO with chips pending repeat labs  -d/c PCEA, po/IV meds prn   -d/c shukla once PCEA removed  -f/u medicine  -Strict I&O's  -continue Ancef  -DVT prophylaxis, Incentive spirometry, OOB

## 2021-09-08 ENCOUNTER — TRANSCRIPTION ENCOUNTER (OUTPATIENT)
Age: 51
End: 2021-09-08

## 2021-09-08 VITALS — SYSTOLIC BLOOD PRESSURE: 184 MMHG | DIASTOLIC BLOOD PRESSURE: 82 MMHG

## 2021-09-08 PROCEDURE — 99232 SBSQ HOSP IP/OBS MODERATE 35: CPT

## 2021-09-08 RX ORDER — CEPHALEXIN 500 MG
1 CAPSULE ORAL
Qty: 10 | Refills: 0
Start: 2021-09-08 | End: 2021-09-12

## 2021-09-08 RX ORDER — OXYCODONE HYDROCHLORIDE 5 MG/1
1 TABLET ORAL
Qty: 24 | Refills: 0
Start: 2021-09-08 | End: 2021-09-13

## 2021-09-08 RX ADMIN — OXYCODONE HYDROCHLORIDE 10 MILLIGRAM(S): 5 TABLET ORAL at 11:21

## 2021-09-08 RX ADMIN — LOSARTAN POTASSIUM 100 MILLIGRAM(S): 100 TABLET, FILM COATED ORAL at 05:37

## 2021-09-08 RX ADMIN — OXYCODONE HYDROCHLORIDE 10 MILLIGRAM(S): 5 TABLET ORAL at 05:37

## 2021-09-08 RX ADMIN — HEPARIN SODIUM 5000 UNIT(S): 5000 INJECTION INTRAVENOUS; SUBCUTANEOUS at 05:37

## 2021-09-08 RX ADMIN — OXYCODONE HYDROCHLORIDE 10 MILLIGRAM(S): 5 TABLET ORAL at 01:11

## 2021-09-08 RX ADMIN — Medication 650 MILLIGRAM(S): at 11:21

## 2021-09-08 RX ADMIN — Medication 100 MILLIGRAM(S): at 07:12

## 2021-09-08 RX ADMIN — Medication 650 MILLIGRAM(S): at 05:37

## 2021-09-08 RX ADMIN — PANTOPRAZOLE SODIUM 40 MILLIGRAM(S): 20 TABLET, DELAYED RELEASE ORAL at 05:37

## 2021-09-08 NOTE — DISCHARGE NOTE NURSING/CASE MANAGEMENT/SOCIAL WORK - PATIENT PORTAL LINK FT
You can access the FollowMyHealth Patient Portal offered by [facility] by registering at the following website: [email or web address]. By joining [employer]’s FollowMyHealth portal, you will also be able to view your health information using other applications (apps) compatible with our system.

## 2021-09-08 NOTE — DISCHARGE NOTE PROVIDER - NSDCCPCAREPLAN_GEN_ALL_CORE_FT
PRINCIPAL DISCHARGE DIAGNOSIS  Diagnosis: Renal mass  Assessment and Plan of Treatment: Drink plenty of fluids.  No heavy lifting (greater than 10 pounds) or straining for 4 to 6 weeks.  You may shower, just pat staples dry. Do not drive when taking pain medication.  Call 's  office  to schedule a follow up appointment.  Call the office if you have fever greater than 101, difficulty urinating, pain not relieved with pain medication, nausea/vomiting..         PRINCIPAL DISCHARGE DIAGNOSIS  Diagnosis: Renal mass  Assessment and Plan of Treatment: Drink plenty of fluids.  No heavy lifting (greater than 10 pounds) or straining for 4 to 6 weeks.  You may shower, just pat staples dry. Do not drive when taking pain medication.  Be aware that pain meds can cause constipation; take metamucil or stool softener; may take laxative if needed.  Call 's  office  to schedule a follow up appointment.  Call the office if you have fever greater than 101, difficulty urinating, pain not relieved with pain medication, nausea/vomiting..         PRINCIPAL DISCHARGE DIAGNOSIS  Diagnosis: Renal mass  Assessment and Plan of Treatment: Drink plenty of fluids.  No heavy lifting (greater than 10 pounds) or straining for 4 to 6 weeks.  You may shower, just pat staples dry. Do not drive when taking pain medication.  Be aware that pain meds can cause constipation; take metamucil or stool softener; may take laxative if needed.  Call 's  office  to schedule a follow up appointment.  Call the office if you have fever greater than 101, difficulty urinating, pain not relieved with pain medication, nausea/vomiting..        SECONDARY DISCHARGE DIAGNOSES  Diagnosis: DM2 (diabetes mellitus, type 2)  Assessment and Plan of Treatment: Your bloodwork check before surgery showed a "Hemoglobin A1c" of 6.8 which indicates a new diagnosis of diabetes. (>6.5 is diagnostic of diabetes).  Please see your primary care doctor Dr. Tae De Guzman and a nutritionist as an outpatient.  Please refer to the diabetes education folder given to you.

## 2021-09-08 NOTE — PROGRESS NOTE ADULT - PROBLEM SELECTOR PLAN 2
-hypotension postop resolved  -hemodynamically stable, home losartan on hold as pt is NPO with NGT  -c/w iv lopressor 5 mg q6h with holding parameters w/ elevated BP/sinus tach, resume losartan when pt is back on regular diet  -optimize pain control
-hypotension postop resolved  -hemodynamically stable, home losartan on hold as pt is NPO with NGT  -agree with iv lopressor 5 mg q6h with holding parameters w/ elevated BP/sinus tach  -optimize pain control
-hypotension postop resolved  -hemodynamically stable, home losartan on hold as pt is NPO with NGT  -c/w iv lopressor 5 mg q6h with holding parameters w/ elevated BP/sinus tach, resume losartan when pt is back on regular diet  -optimize pain control
-hypotension postop resolved  -iv lopressor 5 mg q6h was changed to PO losartan yesterday  -optimize pain control  pain control
-hypotension postop resolved  -hemodynamically stable, home losartan on hold as pt is NPO with NGT  -c/w iv lopressor 5 mg q6h with holding parameters w/ elevated BP/sinus tach, resume losartan when pt is back on regular diet  -optimize pain control

## 2021-09-08 NOTE — PROGRESS NOTE ADULT - ASSESSMENT
50 year old male s/p Exploratory laparotomy, left open radical nephrectomy, EBL 600ml, NGT, and chest tube in place, some hypotension in the PACU improved after receiving Albumin x2, stable.  9/5 - patient OOB to chair. NGT output around 850 overnight. CT dressing changed at bedside.    9/6 - patient OOB to chair. NGT output 10 overnight. Removed this morning. CT setting changed to water seal, CT removed  9/7 - pt feels well, ambulating without difficulty, no CP, SOB, + flatus, no N/V, no BM yet,   9/8 - has flatus; OOB; osvaldo clears  Plan:  - cont with clears for now  - OOB     50 year old male s/p Exploratory laparotomy, left open radical nephrectomy, EBL 600ml, NGT, and chest tube in place, some hypotension in the PACU improved after receiving Albumin x2, stable.  9/5 - patient OOB to chair. NGT output around 850 overnight. CT dressing changed at bedside.    9/6 - patient OOB to chair. NGT output 10 overnight. Removed this morning. CT setting changed to water seal, CT removed  9/7 - pt feels well, ambulating without difficulty, no CP, SOB, + flatus, no N/V, no BM yet,   9/8 - has flatus; OOB; osvaldo clears  Plan:  - reg diet  - OOB  - home later

## 2021-09-08 NOTE — PROGRESS NOTE ADULT - PROBLEM SELECTOR PROBLEM 4
DVT prophylaxis
DM2 (diabetes mellitus, type 2)

## 2021-09-08 NOTE — PROGRESS NOTE ADULT - PROVIDER SPECIALTY LIST ADULT
Pain Medicine
Urology
Pain Medicine
Urology
Hospitalist

## 2021-09-08 NOTE — DISCHARGE NOTE PROVIDER - CARE PROVIDER_API CALL
Pauline Orona)  Urology  [address] [place] [address], [place] [address]  Phone: [phone]  Fax: [phone]  Follow Up Time:    Pauline Orona)  Urology  [address] [facility], [address]  [place] [address]  Phone: [phone]  Fax: [phone]  Follow Up Time:     Tae De Guzman  Cardiovascular Diseases  [address] [facility] [address] [facility], [address]  [place] [address]  Phone: [phone]  Fax: [phone]  Follow Up Time:

## 2021-09-08 NOTE — PROGRESS NOTE ADULT - SUBJECTIVE AND OBJECTIVE BOX
POD #5  Afeb 110/70 94 94%RA    Pt has no c/o  Abd- soft NT ND; + flatus      staples D&I  Void - 300  Olga. clears

## 2021-09-08 NOTE — DISCHARGE NOTE PROVIDER - NSDCMRMEDTOKEN_GEN_ALL_CORE_FT
Keflex 500 mg oral capsule: 1 cap(s) orally every 12 hours   losartan 100 mg oral tablet: 1 tab(s) orally once a day  oxyCODONE 5 mg oral tablet: 1-2 tab(s) orally every 6 hours, As Needed - MDD:2  pantoprazole 40 mg oral delayed release tablet: 1 tab(s) orally once a day  Tylenol 325 mg oral tablet: 3 tab(s) (975mg)orally every 6 hours

## 2021-09-08 NOTE — DISCHARGE NOTE NURSING/CASE MANAGEMENT/SOCIAL WORK - NSDPDISTO_GEN_ALL_CORE
stable, left transverse incision clean dry and intact, tolerating diet, voiding well, oob with assist/Home

## 2021-09-08 NOTE — DISCHARGE NOTE PROVIDER - CARE PROVIDERS DIRECT ADDRESSES
[email or web address]_Unknown

## 2021-09-08 NOTE — PROGRESS NOTE ADULT - PROBLEM SELECTOR PLAN 3
A1C 6.8 on 8/20, on no meds  -FSSS with Admelog coverage.  DM teaching, outpt f/u with PMD Dr. Biswas and nutritionist    d/w pt, also with likely SANDIE, needs sleep study as outpt.
management as above  pain control, iv lopressor, monitor BP

## 2021-09-08 NOTE — PROGRESS NOTE ADULT - PROBLEM SELECTOR PROBLEM 3
Benign essential HTN
DM2 (diabetes mellitus, type 2)
Benign essential HTN

## 2021-09-08 NOTE — PROGRESS NOTE ADULT - PROBLEM SELECTOR PLAN 1
S/P open left radical nephrectomy on 9/3  -management as per   -small left apical pneumothorax, left chest tube care as per , c/w PPI  -awaiting GI function, NGT per , IV protonix  -pain control  -Bowel regimen  -Incentive spirometer  -OOB  -DVT prophylaxis  -weaned off BIPAP, on 2L NC  -postop lab reviewed: Hgb/Creat stable  -ECG 9/4 sinus tach 125
S/P open left radical nephrectomy on 9/3  -management as per   -left chest tube/NGT care as per , c/w PPI  -pain control  -Bowel regimen  -Incentive spirometer  -OOB  -DVT prophylaxis  -weaned off BIPAP, on 2L NC  -postop lab reviewed: Hgb 13.8, Creat 1.24  -ECG 9/4 sinus tach 125
S/P open left radical nephrectomy on 9/3  -management as per  - s/p chest tube for small left apical pneumothorax, GI function returning, NGT removed, IV protonix; on cefazolin IV  -replete phos (low 2.2)  -pain control, Bowel regimen, Incentive spirometer, weaned off O2, monitor sats, OOB/mobilize, DVT prophylaxis
S/P open left radical nephrectomy on 9/3  -management as per  - s/p chest tube for small left apical pneumothorax, GI function returning, NGT removed, IV protonix; on cefazolin IV  -replete phos (low 2.2)  -pain control, Bowel regimen, Incentive spirometer, weaned off O2, monitor sats, OOB/mobilize, DVT prophylaxis
S/P open left radical nephrectomy on 9/3  -management as per   -left chest tube for small left apical pneumothorax, repeat CXR 9/6 resolution of pneumothorax, can dc chest tube, management per   -awaiting GI function, NGT removed, IV protonix  -replete phos (low 2.2)  -pain control  -Bowel regimen  -Incentive spirometer, weaned off O2, monitor sats  -OOB  -DVT prophylaxis  -ECG 9/4 sinus tach 125

## 2021-09-08 NOTE — PROGRESS NOTE ADULT - SUBJECTIVE AND OBJECTIVE BOX
[facility] Division of Hospital Medicine  Margaret Ordonez MD  Pager (M-F, 8A-5P):  In-house pager [phone]; Long-range pager [phone]  Other Times:  Please page Hospitalist in Charge -  In-house pager [phone]    Patient is a 50y old  Male who presents with a chief complaint of surgery (07 Sep 2021 09:28)    SUBJECTIVE / OVERNIGHT EVENTS: Didn't get much sleep last night due to changing rooms.  Otherwise doing ok, pain pump and shukla have been removed.    HbA1c 6.8 on preop labs, pt had been told had preDM, now meets criteria for DM, reviewed basics of diet, encouraged outpt f/u with PMD/dietician.  ADDITIONAL REVIEW OF SYSTEMS:    MEDICATIONS  (STANDING):  acetaminophen   Tablet .. 650 milliGRAM(s) Oral every 6 hours  ceFAZolin   IVPB 1000 milliGRAM(s) IV Intermittent every 8 hours  heparin   Injectable 5000 Unit(s) SubCutaneous every 8 hours  lactated ringers. 1000 milliLiter(s) (100 mL/Hr) IV Continuous <Continuous>  losartan 100 milliGRAM(s) Oral daily  pantoprazole    Tablet 40 milliGRAM(s) Oral before breakfast    MEDICATIONS  (PRN):  benzocaine 15 mG/menthol 3.6 mG (Sugar-Free) Lozenge 1 Lozenge Oral three times a day PRN Sore Throat  HYDROmorphone  Injectable 0.5 milliGRAM(s) IV Push every 3 hours PRN Severe breakthrough Pain (7 - 10)  melatonin 3 milliGRAM(s) Oral at bedtime PRN Insomnia  naloxone Injectable 0.1 milliGRAM(s) IV Push every 3 minutes PRN For ANY of the following changes in patient status:  A. RR LESS THAN 10 breaths per minute, B. Oxygen saturation LESS THAN 90%, C. Sedation score of 6  ondansetron Injectable 4 milliGRAM(s) IV Push every 6 hours PRN Nausea and/or Vomiting  oxyCODONE    IR 5 milliGRAM(s) Oral every 3 hours PRN Moderate Pain (4 - 6)  oxyCODONE    IR 10 milliGRAM(s) Oral every 3 hours PRN Severe Pain (7 - 10)  senna 2 Tablet(s) Oral at bedtime PRN Constipation    I&O's Summary    07 Sep 2021 07:01  -  08 Sep 2021 07:00  --------------------------------------------------------  IN: 760 mL / OUT: 1500 mL / NET: -740 mL    PHYSICAL EXAM:  Vital Signs Last 24 Hrs  T(C): 36.6 (08 Sep 2021 09:41), Max: 36.6 (07 Sep 2021 20:33)  T(F): 97.8 (08 Sep 2021 09:41), Max: 97.8 (07 Sep 2021 20:33)  HR: 98 (08 Sep 2021 09:41) (84 - 100)  BP: 198/92 (08 Sep 2021 09:41) (110/70 - 198/92)  BP(mean): --  RR: 18 (08 Sep 2021 09:41) (18 - 20)  SpO2: 95% (08 Sep 2021 09:41) (93% - 96%)    CONSTITUTIONAL: obese WM, sitting up in chair, NAD  RESPIRATORY: Normal respiratory effort; lungs clear, decreased AE  CARDIOVASCULAR: RRR  ABDOMEN: soft, left flank incision wound clean, previous chest tube site covered with dressing  : shukla has been removed  MUSCULOSKELETAL: no clubbing or cyanosis of digits; no joint swelling or tenderness to palpation  PSYCH: A+O to person, place, and time; affect appropriate    LABS:                        13.6   9.90  )-----------( 242      ( 07 Sep 2021 09:41 )             42.5     09-07    135  |  95<L>  |  14  ----------------------------<  98  4.2   |  31  |  1.10    Ca    8.7      07 Sep 2021 09:41  Phos  1.6     09-07  Mg     1.20     09-07    PT/INR - ( 07 Sep 2021 09:41 )   PT: 12.6 sec;   INR: 1.10 ratio    PTT - ( 07 Sep 2021 09:41 )  PTT:28.6 sec    RADIOLOGY & ADDITIONAL TESTS:  Results Reviewed:   Imaging Personally Reviewed:  Electrocardiogram Personally Reviewed:    COORDINATION OF CARE:  Care Discussed with Consultants/Other Providers [Y/N]: urology re overall care  Prior or Outpatient Records Reviewed [Y/N]:

## 2021-09-08 NOTE — DISCHARGE NOTE PROVIDER - HOSPITAL COURSE
50 year old male s/p Exploratory laparotomy, left open radical nephrectomy, EBL 600ml, NGT, and chest tube in place, some hypotension in the PACU improved after receiving Albumin x2, stable.  9/5 - patient OOB to chair. NGT output around 850 overnight. CT dressing changed at bedside.    9/6 - patient OOB to chair. NGT output 10 overnight. Removed this morning. CT setting changed to water seal, CT removed  9/7 - pt feels well, ambulating without difficulty, no CP, SOB, + flatus, no N/V, no BM yet,   9/8 - has flatus; OOB; osvaldo clears  Plan:  - reg diet  - OOB  - home later 50 year old male s/p Exploratory laparotomy, left open radical nephrectomy, EBL 600ml, NGT, and chest tube in place, some hypotension in the PACU improved after receiving Albumin x2, stable.  9/5 - patient OOB to chair. NGT output around 850 overnight. CT dressing changed at bedside.    9/6 - patient OOB to chair. NGT output 10 overnight. Removed this morning. CT setting changed to water seal, CT removed  9/7 - pt feels well, ambulating without difficulty, no CP, SOB, + flatus, no N/V, no BM yet,   9/8 - has flatus; OOB; osvaldo clears  Plan:  - reg diet  - OOB  - home later    Pathology renal cell carcinoma

## 2021-09-08 NOTE — PROGRESS NOTE ADULT - PROBLEM SELECTOR PLAN 4
A1C 6.8 on no meds  -FSSS with Admelog coverage.
A1C 6.8 on 8/20, on no meds  -FSSS with Admelog coverage.  DM teaching, outpt f/u with PMD Dr. Biswas    d/w pt, also with likely SANDIE, needs sleep study as outpt.
Heparin sc
A1C 6.8 on no meds  -FSSS with Admelog coverage.
A1C 6.8 on no meds  -FSSS with Admelog coverage.

## 2021-09-08 NOTE — DISCHARGE NOTE NURSING/CASE MANAGEMENT/SOCIAL WORK - NSDCPNINST_GEN_ALL_CORE
Notify Dr Orona if you experience any increase in pain not relieved with medication, any redness, drainage or swelling around incision any persistent nausea vomiting or if you develop a fever >100.5.  Drink plenty of fluids.  No heavy lifting or straining.  Continue to follow consistent carb diet and follow up with PMD for continued management of your diabetes.

## 2021-09-09 LAB — SURGICAL PATHOLOGY STUDY: SIGNIFICANT CHANGE UP

## 2021-09-15 ENCOUNTER — APPOINTMENT (OUTPATIENT)
Dept: UROLOGY | Facility: CLINIC | Age: 51
End: 2021-09-15
Payer: MEDICAID

## 2021-09-15 PROCEDURE — 99024 POSTOP FOLLOW-UP VISIT: CPT

## 2021-09-15 NOTE — PHYSICAL EXAM
[General Appearance - Well Developed] : well developed [General Appearance - Well Nourished] : well nourished [Normal Appearance] : normal appearance [Well Groomed] : well groomed [General Appearance - In No Acute Distress] : no acute distress [Abdomen Soft] : soft [Abdomen Tenderness] : non-tender [Costovertebral Angle Tenderness] : no ~M costovertebral angle tenderness [Urethral Meatus] : meatus normal [Urinary Bladder Findings] : the bladder was normal on palpation [Scrotum] : the scrotum was normal [Testes Mass (___cm)] : there were no testicular masses [No Prostate Nodules] : no prostate nodules [Edema] : no peripheral edema [Respiration, Rhythm And Depth] : normal respiratory rhythm and effort [] : no respiratory distress [Exaggerated Use Of Accessory Muscles For Inspiration] : no accessory muscle use [Oriented To Time, Place, And Person] : oriented to person, place, and time [Affect] : the affect was normal [Not Anxious] : not anxious [Mood] : the mood was normal [Normal Station and Gait] : the gait and station were normal for the patient's age [No Focal Deficits] : no focal deficits [No Palpable Adenopathy] : no palpable adenopathy

## 2021-10-18 ENCOUNTER — OUTPATIENT (OUTPATIENT)
Dept: OUTPATIENT SERVICES | Facility: HOSPITAL | Age: 51
LOS: 1 days | End: 2021-10-18
Payer: MEDICAID

## 2021-10-18 ENCOUNTER — APPOINTMENT (OUTPATIENT)
Dept: CT IMAGING | Facility: IMAGING CENTER | Age: 51
End: 2021-10-18
Payer: MEDICAID

## 2021-10-18 DIAGNOSIS — Z98.890 OTHER SPECIFIED POSTPROCEDURAL STATES: Chronic | ICD-10-CM

## 2021-10-18 DIAGNOSIS — C64.9 MALIGNANT NEOPLASM OF UNSPECIFIED KIDNEY, EXCEPT RENAL PELVIS: ICD-10-CM

## 2021-10-18 PROCEDURE — 74178 CT ABD&PLV WO CNTR FLWD CNTR: CPT

## 2021-10-18 PROCEDURE — 82565 ASSAY OF CREATININE: CPT

## 2021-10-18 PROCEDURE — 74178 CT ABD&PLV WO CNTR FLWD CNTR: CPT | Mod: 26

## 2021-11-23 ENCOUNTER — OUTPATIENT (OUTPATIENT)
Dept: OUTPATIENT SERVICES | Facility: HOSPITAL | Age: 51
LOS: 1 days | End: 2021-11-23

## 2021-11-23 VITALS
HEIGHT: 71 IN | WEIGHT: 315 LBS | SYSTOLIC BLOOD PRESSURE: 150 MMHG | TEMPERATURE: 97 F | DIASTOLIC BLOOD PRESSURE: 98 MMHG | OXYGEN SATURATION: 98 % | HEART RATE: 100 BPM

## 2021-11-23 DIAGNOSIS — Z98.890 OTHER SPECIFIED POSTPROCEDURAL STATES: Chronic | ICD-10-CM

## 2021-11-23 DIAGNOSIS — C64.9 MALIGNANT NEOPLASM OF UNSPECIFIED KIDNEY, EXCEPT RENAL PELVIS: ICD-10-CM

## 2021-11-23 DIAGNOSIS — I10 ESSENTIAL (PRIMARY) HYPERTENSION: ICD-10-CM

## 2021-11-23 DIAGNOSIS — Z90.5 ACQUIRED ABSENCE OF KIDNEY: Chronic | ICD-10-CM

## 2021-11-23 DIAGNOSIS — Z01.812 ENCOUNTER FOR PREPROCEDURAL LABORATORY EXAMINATION: ICD-10-CM

## 2021-11-23 LAB
ANION GAP SERPL CALC-SCNC: 12 MMOL/L — SIGNIFICANT CHANGE UP (ref 7–14)
BLD GP AB SCN SERPL QL: NEGATIVE — SIGNIFICANT CHANGE UP
BUN SERPL-MCNC: 22 MG/DL — SIGNIFICANT CHANGE UP (ref 7–23)
CALCIUM SERPL-MCNC: 9.6 MG/DL — SIGNIFICANT CHANGE UP (ref 8.4–10.5)
CHLORIDE SERPL-SCNC: 97 MMOL/L — LOW (ref 98–107)
CO2 SERPL-SCNC: 28 MMOL/L — SIGNIFICANT CHANGE UP (ref 22–31)
CREAT SERPL-MCNC: 1.32 MG/DL — HIGH (ref 0.5–1.3)
GLUCOSE SERPL-MCNC: 90 MG/DL — SIGNIFICANT CHANGE UP (ref 70–99)
HCT VFR BLD CALC: 50.6 % — HIGH (ref 39–50)
HGB BLD-MCNC: 16.4 G/DL — SIGNIFICANT CHANGE UP (ref 13–17)
MCHC RBC-ENTMCNC: 27.7 PG — SIGNIFICANT CHANGE UP (ref 27–34)
MCHC RBC-ENTMCNC: 32.4 GM/DL — SIGNIFICANT CHANGE UP (ref 32–36)
MCV RBC AUTO: 85.6 FL — SIGNIFICANT CHANGE UP (ref 80–100)
NRBC # BLD: 0 /100 WBCS — SIGNIFICANT CHANGE UP
NRBC # FLD: 0 K/UL — SIGNIFICANT CHANGE UP
PLATELET # BLD AUTO: 309 K/UL — SIGNIFICANT CHANGE UP (ref 150–400)
POTASSIUM SERPL-MCNC: 4.2 MMOL/L — SIGNIFICANT CHANGE UP (ref 3.5–5.3)
POTASSIUM SERPL-SCNC: 4.2 MMOL/L — SIGNIFICANT CHANGE UP (ref 3.5–5.3)
RBC # BLD: 5.91 M/UL — HIGH (ref 4.2–5.8)
RBC # FLD: 15.5 % — HIGH (ref 10.3–14.5)
RH IG SCN BLD-IMP: POSITIVE — SIGNIFICANT CHANGE UP
SODIUM SERPL-SCNC: 137 MMOL/L — SIGNIFICANT CHANGE UP (ref 135–145)
WBC # BLD: 11.66 K/UL — HIGH (ref 3.8–10.5)
WBC # FLD AUTO: 11.66 K/UL — HIGH (ref 3.8–10.5)

## 2021-11-23 RX ORDER — ACETAMINOPHEN 500 MG
3 TABLET ORAL
Qty: 0 | Refills: 0 | DISCHARGE

## 2021-11-23 RX ORDER — PANTOPRAZOLE SODIUM 20 MG/1
1 TABLET, DELAYED RELEASE ORAL
Qty: 0 | Refills: 0 | DISCHARGE

## 2021-11-23 NOTE — H&P PST ADULT - PROBLEM SELECTOR PLAN 3
per pt scheduled within 72 hrs of this surgery   SANDIE precautions recommended. - pt with 7 positives on sleep apnea questionnaire

## 2021-11-23 NOTE — H&P PST ADULT - NSANTHOSAYNRD_GEN_A_CORE
never tested/No. SANDIE screening performed.  STOP BANG Legend: 0-2 = LOW Risk; 3-4 = INTERMEDIATE Risk; 5-8 = HIGH Risk No. SANDIE screening performed.  STOP BANG Legend: 0-2 = LOW Risk; 3-4 = INTERMEDIATE Risk; 5-8 = HIGH Risk

## 2021-11-23 NOTE — H&P PST ADULT - NSICDXPASTMEDICALHX_GEN_ALL_CORE_FT
PAST MEDICAL HISTORY:  Cigarette smoker for 8 yrs , ,1 pc daily    GERD (gastroesophageal reflux disease)     History of prediabetes     HTN (hypertension)     Morbid obesity      PAST MEDICAL HISTORY:  Cigarette smoker     Deviated nasal septum     GERD (gastroesophageal reflux disease)     History of prediabetes     HTN (hypertension)     Malignant neoplasm of unspecified kidney, except renal pelvis     Morbid obesity

## 2021-11-23 NOTE — H&P PST ADULT - NEGATIVE GENERAL GENITOURINARY SYMPTOMS
20 no renal colic/no flank pain L/no flank pain R/no bladder infections/no incontinence/no dysuria/no urinary hesitancy/normal urinary frequency

## 2021-11-23 NOTE — H&P PST ADULT - PROBLEM SELECTOR PLAN 2
Pt instructed to take losartan on morning of surgery.   Elevated b/p, states forgot to take losartan this morning.  Denies symptomatology.  Instructed to take upon arrival home.  Medical eval pending &recent stress test

## 2021-11-23 NOTE — H&P PST ADULT - NEGATIVE CARDIOVASCULAR SYMPTOMS
no chest pain/no palpitations/no dyspnea on exertion no chest pain/no palpitations/no dyspnea on exertion/no orthopnea/no paroxysmal nocturnal dyspnea

## 2021-11-23 NOTE — H&P PST ADULT - PROBLEM SELECTOR PLAN 1
Scheduled for right partial nephrectomy on 12/3/2021  Written & verbal preop instructions, gi prophylaxis & surgical soap given  Pt verbalized good understanding.  Teach back done on surgical soap instructions.

## 2021-11-23 NOTE — H&P PST ADULT - HISTORY OF PRESENT ILLNESS
50 yrs old male who stated that he saw blood in his urine 3 wks ago and so went to the urologist and had multiple testing that showed a mass on his left kidney Pt presents for preop to have left radical nephrectomy 51 yrs old male who stated that he saw blood in his urine 3 wks ago and so went to the urologist and had multiple testing that showed a mass to b/l kidneys.   Pt is s/p  left radical nephrectomy 9/3/2021.  Now scheduled right partial nephrectomy on 12/03/2021. Preop dx malignant neoplasm unspecified kidney except renal pelvis.

## 2021-11-23 NOTE — H&P PST ADULT - NSICDXPASTSURGICALHX_GEN_ALL_CORE_FT
PAST SURGICAL HISTORY:  H/O colonoscopy      PAST SURGICAL HISTORY:  H/O colonoscopy     S/p nephrectomy right 9/3/2021

## 2021-11-24 LAB
CULTURE RESULTS: SIGNIFICANT CHANGE UP
SPECIMEN SOURCE: SIGNIFICANT CHANGE UP

## 2021-11-30 ENCOUNTER — APPOINTMENT (OUTPATIENT)
Dept: DISASTER EMERGENCY | Facility: CLINIC | Age: 51
End: 2021-11-30

## 2021-11-30 LAB — SARS-COV-2 N GENE NPH QL NAA+PROBE: NOT DETECTED

## 2021-12-01 ENCOUNTER — OUTPATIENT (OUTPATIENT)
Dept: OUTPATIENT SERVICES | Facility: HOSPITAL | Age: 51
LOS: 1 days | End: 2021-12-01
Payer: MEDICAID

## 2021-12-01 DIAGNOSIS — Z90.5 ACQUIRED ABSENCE OF KIDNEY: Chronic | ICD-10-CM

## 2021-12-01 DIAGNOSIS — Z98.890 OTHER SPECIFIED POSTPROCEDURAL STATES: Chronic | ICD-10-CM

## 2021-12-01 PROCEDURE — G9005: CPT

## 2021-12-02 ENCOUNTER — TRANSCRIPTION ENCOUNTER (OUTPATIENT)
Age: 51
End: 2021-12-02

## 2021-12-02 NOTE — ASU PATIENT PROFILE, ADULT - FALL HARM RISK - UNIVERSAL INTERVENTIONS
Bed in lowest position, wheels locked, appropriate side rails in place/Call bell, personal items and telephone in reach/Instruct patient to call for assistance before getting out of bed or chair/Non-slip footwear when patient is out of bed/[place] to call system/Physically safe environment - no spills, clutter or unnecessary equipment/Purposeful Proactive Rounding/Room/bathroom lighting operational, light cord in reach

## 2021-12-02 NOTE — ASU PATIENT PROFILE, ADULT - CAREGIVER RELATION TO PATIENT
CASE MANAGEMENT INITIAL ASSESSMENT      Reviewed chart and completed assessment with patient: And daughter at bedside   Explained Case Management role/services. Health Care Decision Maker :   Primary Decision Maker: Bandar Gordillo Child - [phone]        Admit date/status: 11/16/21 OBS   Diagnosis:  Near Syncope   Is this a Readmission?:  No      Insurance: Medicare, [facility] secondary    Precert required for SNF: No       3 night stay required: Yes    Living arrangements, Adls, care needs, prior to admission: Pt lives in ranch style home with 3 JIM. Her  passed approx 3 weeks ago and family has been staying with her 24/7 in the meantime. Pt is IPTA and drives. Family is able to assist with transport as needed. Transportation: Private via family      [address] at home:  Walker__Cane_x_RTS__ BSC__Shower Chair_x_  02__ HHN__ CPAP__  BiPap__  Hospital Bed__ W/C___ Other__________    Services in the home and/or outpatient, prior to admission: None     PT/OT recs: Not yet ordered - will likely be needed prior to [address] Notification (HEN): Needed for SNF     Barriers to discharge: None     Plan/comments: Dispo unclear at this time as pt is undergoing medical w/u. Pt will likely benefit from PT/OT eval when appropriate. Pt/ Family open to [facility] if indicated. CM will continue to follow and assist with needs as able as needs become more apparent.      Marcia Goldberg, RN       ECOC on chart for MD signature spouse

## 2021-12-02 NOTE — ASU PATIENT PROFILE, ADULT - NSICDXPASTMEDICALHX_GEN_ALL_CORE_FT
PAST MEDICAL HISTORY:  Cigarette smoker     Deviated nasal septum     GERD (gastroesophageal reflux disease)     History of prediabetes     HTN (hypertension)     Malignant neoplasm of unspecified kidney, except renal pelvis     Morbid obesity

## 2021-12-03 ENCOUNTER — APPOINTMENT (OUTPATIENT)
Dept: UROLOGY | Facility: HOSPITAL | Age: 51
End: 2021-12-03

## 2021-12-03 ENCOUNTER — RESULT REVIEW (OUTPATIENT)
Age: 51
End: 2021-12-03

## 2021-12-03 ENCOUNTER — INPATIENT (INPATIENT)
Facility: HOSPITAL | Age: 51
LOS: 3 days | Discharge: HOME | End: 2021-12-07
Attending: UROLOGY | Admitting: UROLOGY
Payer: MEDICAID

## 2021-12-03 VITALS
DIASTOLIC BLOOD PRESSURE: 98 MMHG | SYSTOLIC BLOOD PRESSURE: 181 MMHG | WEIGHT: 315 LBS | HEIGHT: 71 IN | RESPIRATION RATE: 18 BRPM | OXYGEN SATURATION: 94 % | TEMPERATURE: 98 F | HEART RATE: 97 BPM

## 2021-12-03 DIAGNOSIS — Z29.9 ENCOUNTER FOR PROPHYLACTIC MEASURES, UNSPECIFIED: ICD-10-CM

## 2021-12-03 DIAGNOSIS — Z90.5 ACQUIRED ABSENCE OF KIDNEY: Chronic | ICD-10-CM

## 2021-12-03 DIAGNOSIS — Z87.898 PERSONAL HISTORY OF OTHER SPECIFIED CONDITIONS: ICD-10-CM

## 2021-12-03 DIAGNOSIS — Z98.890 OTHER SPECIFIED POSTPROCEDURAL STATES: Chronic | ICD-10-CM

## 2021-12-03 DIAGNOSIS — C64.9 MALIGNANT NEOPLASM OF UNSPECIFIED KIDNEY, EXCEPT RENAL PELVIS: ICD-10-CM

## 2021-12-03 DIAGNOSIS — N17.9 ACUTE KIDNEY FAILURE, UNSPECIFIED: ICD-10-CM

## 2021-12-03 LAB
ANION GAP SERPL CALC-SCNC: 9 MMOL/L — SIGNIFICANT CHANGE UP (ref 7–14)
BUN SERPL-MCNC: 25 MG/DL — HIGH (ref 7–23)
CALCIUM SERPL-MCNC: 8.3 MG/DL — LOW (ref 8.4–10.5)
CHLORIDE SERPL-SCNC: 102 MMOL/L — SIGNIFICANT CHANGE UP (ref 98–107)
CO2 SERPL-SCNC: 24 MMOL/L — SIGNIFICANT CHANGE UP (ref 22–31)
CREAT SERPL-MCNC: 1.59 MG/DL — HIGH (ref 0.5–1.3)
GAS PNL BLDA: SIGNIFICANT CHANGE UP
GLUCOSE SERPL-MCNC: 157 MG/DL — HIGH (ref 70–99)
HCT VFR BLD CALC: 42.1 % — SIGNIFICANT CHANGE UP (ref 39–50)
HGB BLD-MCNC: 13.3 G/DL — SIGNIFICANT CHANGE UP (ref 13–17)
MAGNESIUM SERPL-MCNC: 1.8 MG/DL — SIGNIFICANT CHANGE UP (ref 1.6–2.6)
MCHC RBC-ENTMCNC: 27.9 PG — SIGNIFICANT CHANGE UP (ref 27–34)
MCHC RBC-ENTMCNC: 31.6 GM/DL — LOW (ref 32–36)
MCV RBC AUTO: 88.4 FL — SIGNIFICANT CHANGE UP (ref 80–100)
NRBC # BLD: 0 /100 WBCS — SIGNIFICANT CHANGE UP
NRBC # FLD: 0 K/UL — SIGNIFICANT CHANGE UP
PHOSPHATE SERPL-MCNC: 3.1 MG/DL — SIGNIFICANT CHANGE UP (ref 2.5–4.5)
PLATELET # BLD AUTO: 226 K/UL — SIGNIFICANT CHANGE UP (ref 150–400)
POTASSIUM SERPL-MCNC: 5.1 MMOL/L — SIGNIFICANT CHANGE UP (ref 3.5–5.3)
POTASSIUM SERPL-SCNC: 5.1 MMOL/L — SIGNIFICANT CHANGE UP (ref 3.5–5.3)
RBC # BLD: 4.76 M/UL — SIGNIFICANT CHANGE UP (ref 4.2–5.8)
RBC # FLD: 15.8 % — HIGH (ref 10.3–14.5)
SODIUM SERPL-SCNC: 135 MMOL/L — SIGNIFICANT CHANGE UP (ref 135–145)
WBC # BLD: 14.65 K/UL — HIGH (ref 3.8–10.5)
WBC # FLD AUTO: 14.65 K/UL — HIGH (ref 3.8–10.5)

## 2021-12-03 PROCEDURE — 50240 NEPHRECTOMY PARTIAL: CPT | Mod: 50

## 2021-12-03 PROCEDURE — 88313 SPECIAL STAINS GROUP 2: CPT | Mod: 26

## 2021-12-03 PROCEDURE — 99222 1ST HOSP IP/OBS MODERATE 55: CPT

## 2021-12-03 PROCEDURE — 88307 TISSUE EXAM BY PATHOLOGIST: CPT | Mod: 26

## 2021-12-03 PROCEDURE — 88305 TISSUE EXAM BY PATHOLOGIST: CPT | Mod: 26

## 2021-12-03 RX ORDER — ACETAMINOPHEN 500 MG
975 TABLET ORAL EVERY 6 HOURS
Refills: 0 | Status: COMPLETED | OUTPATIENT
Start: 2021-12-03 | End: 2021-12-05

## 2021-12-03 RX ORDER — SENNA PLUS 8.6 MG/1
2 TABLET ORAL AT BEDTIME
Refills: 0 | Status: DISCONTINUED | OUTPATIENT
Start: 2021-12-03 | End: 2021-12-07

## 2021-12-03 RX ORDER — SODIUM CHLORIDE 9 MG/ML
1000 INJECTION, SOLUTION INTRAVENOUS
Refills: 0 | Status: DISCONTINUED | OUTPATIENT
Start: 2021-12-03 | End: 2021-12-04

## 2021-12-03 RX ORDER — LACTOBACILLUS ACIDOPHILUS 100MM CELL
1 CAPSULE ORAL DAILY
Refills: 0 | Status: DISCONTINUED | OUTPATIENT
Start: 2021-12-03 | End: 2021-12-07

## 2021-12-03 RX ORDER — LOSARTAN POTASSIUM 100 MG/1
100 TABLET, FILM COATED ORAL DAILY
Refills: 0 | Status: DISCONTINUED | OUTPATIENT
Start: 2021-12-03 | End: 2021-12-03

## 2021-12-03 RX ORDER — POLYETHYLENE GLYCOL 3350 17 G/17G
17 POWDER, FOR SOLUTION ORAL DAILY
Refills: 0 | Status: DISCONTINUED | OUTPATIENT
Start: 2021-12-03 | End: 2021-12-07

## 2021-12-03 RX ORDER — ACETAMINOPHEN 500 MG
1000 TABLET ORAL EVERY 6 HOURS
Refills: 0 | Status: DISCONTINUED | OUTPATIENT
Start: 2021-12-03 | End: 2021-12-03

## 2021-12-03 RX ORDER — AMLODIPINE BESYLATE 2.5 MG/1
10 TABLET ORAL DAILY
Refills: 0 | Status: DISCONTINUED | OUTPATIENT
Start: 2021-12-03 | End: 2021-12-07

## 2021-12-03 RX ORDER — HEPARIN SODIUM 5000 [USP'U]/ML
5000 INJECTION INTRAVENOUS; SUBCUTANEOUS EVERY 8 HOURS
Refills: 0 | Status: DISCONTINUED | OUTPATIENT
Start: 2021-12-03 | End: 2021-12-07

## 2021-12-03 RX ORDER — NALOXONE HYDROCHLORIDE 4 MG/.1ML
0.1 SPRAY NASAL
Refills: 0 | Status: DISCONTINUED | OUTPATIENT
Start: 2021-12-03 | End: 2021-12-07

## 2021-12-03 RX ORDER — CEFAZOLIN SODIUM 1 G
2000 VIAL (EA) INJECTION EVERY 8 HOURS
Refills: 0 | Status: DISCONTINUED | OUTPATIENT
Start: 2021-12-03 | End: 2021-12-07

## 2021-12-03 RX ORDER — SODIUM CHLORIDE 9 MG/ML
1000 INJECTION, SOLUTION INTRAVENOUS
Refills: 0 | Status: DISCONTINUED | OUTPATIENT
Start: 2021-12-03 | End: 2021-12-03

## 2021-12-03 RX ORDER — CEFAZOLIN SODIUM 1 G
3000 VIAL (EA) INJECTION EVERY 8 HOURS
Refills: 0 | Status: DISCONTINUED | OUTPATIENT
Start: 2021-12-03 | End: 2021-12-03

## 2021-12-03 RX ORDER — ONDANSETRON 8 MG/1
4 TABLET, FILM COATED ORAL EVERY 6 HOURS
Refills: 0 | Status: DISCONTINUED | OUTPATIENT
Start: 2021-12-03 | End: 2021-12-07

## 2021-12-03 RX ORDER — LIDOCAINE 4 G/100G
1 CREAM TOPICAL EVERY 24 HOURS
Refills: 0 | Status: DISCONTINUED | OUTPATIENT
Start: 2021-12-03 | End: 2021-12-07

## 2021-12-03 RX ORDER — LOSARTAN POTASSIUM 100 MG/1
1 TABLET, FILM COATED ORAL
Qty: 0 | Refills: 0 | DISCHARGE

## 2021-12-03 RX ADMIN — AMLODIPINE BESYLATE 10 MILLIGRAM(S): 2.5 TABLET ORAL at 17:34

## 2021-12-03 RX ADMIN — Medication 100 MILLIGRAM(S): at 22:56

## 2021-12-03 RX ADMIN — HEPARIN SODIUM 5000 UNIT(S): 5000 INJECTION INTRAVENOUS; SUBCUTANEOUS at 22:55

## 2021-12-03 RX ADMIN — LIDOCAINE 1 PATCH: 4 CREAM TOPICAL at 18:38

## 2021-12-03 RX ADMIN — SENNA PLUS 2 TABLET(S): 8.6 TABLET ORAL at 22:56

## 2021-12-03 RX ADMIN — Medication 975 MILLIGRAM(S): at 17:34

## 2021-12-03 RX ADMIN — ONDANSETRON 4 MILLIGRAM(S): 8 TABLET, FILM COATED ORAL at 17:15

## 2021-12-03 RX ADMIN — SODIUM CHLORIDE 150 MILLILITER(S): 9 INJECTION, SOLUTION INTRAVENOUS at 17:34

## 2021-12-03 RX ADMIN — SODIUM CHLORIDE 125 MILLILITER(S): 9 INJECTION, SOLUTION INTRAVENOUS at 18:38

## 2021-12-03 NOTE — CONSULT NOTE ADULT - PROBLEM SELECTOR RECOMMENDATION 2
BP elevated on Losartan at home.  -D/C Losartan in the setting of anticipated VEENA  -Amlodipine 10mg PO daily  -Monitor BP  -Hydralazine PRN for SBP > 180

## 2021-12-03 NOTE — CONSULT NOTE ADULT - SUBJECTIVE AND OBJECTIVE BOX
Patient is a 51y old  Male who presents with a chief complaint of "excision of lesion right kidney" (23 Nov 2021 17:02)      HPI:  51 yrs old male with h/o HTN, prediabetic, s/p left radical nephrectomy, 9/3/21, recent dx of right renal mass admitted for right open partial nephrectomy today. S/P OR, POD#0, EBL 100cc. Currently pt has no complaints, denies any pain, no CP or SOB     History limited due to: [ ] Dementia  [ ] Delirium  [ ] Condition    Pain Symptoms if applicable:             	                         none	    Pain:	                            0	      Location:	  Modifying factors:	  Associated symptoms:	    Function: [x ] Independent  [ ] Assistance  [ ] Total care  [ ] Non-ambulatory    Allergies    No Known Allergies    Intolerances        HOME MEDICATIONS: [x ] Reviewed    MEDICATIONS  (STANDING):  acetaminophen     Tablet .. 975 milliGRAM(s) Oral every 6 hours  ceFAZolin   IVPB 3000 milliGRAM(s) IV Intermittent every 8 hours  heparin   Injectable 5000 Unit(s) SubCutaneous every 8 hours  hydromorphone (10 MICROgram(s)/mL) + bupivacaine 0.0625% in 0.9% Sodium Chloride PCEA 250 milliLiter(s) Epidural PCA Continuous  lactated ringers. 1000 milliLiter(s) (150 mL/Hr) IV Continuous <Continuous>  lactobacillus acidophilus 1 Tablet(s) Oral daily  losartan 100 milliGRAM(s) Oral daily  polyethylene glycol 3350 17 Gram(s) Oral daily  senna 2 Tablet(s) Oral at bedtime    MEDICATIONS  (PRN):  hydromorphone (10 MICROgram(s)/mL) + bupivacaine 0.0625% in 0.9% Sodium Chloride PCEA Rescue Clinician  Bolus 5 milliLiter(s) Epidural every 15 minutes PRN for Pain Score greater than 6  naloxone Injectable 0.1 milliGRAM(s) IV Push every 3 minutes PRN For ANY of the following changes in patient status:  A. RR LESS THAN 10 breaths per minute, B. Oxygen saturation LESS THAN 90%, C. Sedation score of 6  ondansetron Injectable 4 milliGRAM(s) IV Push every 6 hours PRN Nausea      PAST MEDICAL & SURGICAL HISTORY:  Morbid obesity    Cigarette smoker    HTN (hypertension)    GERD (gastroesophageal reflux disease)    History of prediabetes    Deviated nasal septum    Malignant neoplasm of unspecified kidney, except renal pelvis    H/O colonoscopy    S/p nephrectomy  right 9/3/2021    [x ] Reviewed     SOCIAL HISTORY:  Residence: [ ] [facility]  [ ] SNF  [x ] Community  [ ] Substance abuse: denies  [ ] Tobacco: (+) ex-smoker, quit 3 months ago  [ ] Alcohol use: denies     FAMILY HISTORY:  No pertinent family history in first degree relatives    [ ] No pertinent family history in first degree relatives     REVIEW OF SYSTEMS:    CONSTITUTIONAL: No fever, weight loss, or fatigue  EYES: No eye pain, visual disturbances, or discharge  ENMT:  No difficulty hearing, tinnitus, vertigo; No sinus or throat pain  NECK: No pain or stiffness  BREASTS: No pain, masses, or nipple discharge  RESPIRATORY: No cough, wheezing, chills or hemoptysis; No shortness of breath  CARDIOVASCULAR: No chest pain, palpitations, dizziness, or leg swelling  GASTROINTESTINAL: No abdominal or epigastric pain. No nausea, vomiting, or hematemesis; No diarrhea or constipation. No melena or hematochezia. No flatus or BM post op  GENITOURINARY: No dysuria, frequency, hematuria, or incontinence. (+) shukla post op   NEUROLOGICAL: No headaches, memory loss, loss of strength, numbness, or tremors  SKIN: No itching, burning, rashes, or lesions   LYMPH NODES: No enlarged glands  ENDOCRINE: No heat or cold intolerance; No hair loss  MUSCULOSKELETAL: No muscle or back pain  PSYCHIATRIC: No depression, anxiety, mood swings, or difficulty sleeping  HEME/LYMPH: No easy bruising, or bleeding gums  ALLERGY AND IMMUNOLOGIC: No hives or eczema    [  ] All other ROS negative  [  ] Unable to obtain due to poor mental status    Vital Signs Last 24 Hrs  T(C): 36.4 (03 Dec 2021 08:56), Max: 36.4 (03 Dec 2021 08:56)  T(F): 97.5 (03 Dec 2021 08:56), Max: 97.5 (03 Dec 2021 08:56)  HR: 97 (03 Dec 2021 08:56) (97 - 97)  BP: 181/98 (03 Dec 2021 08:56) (181/98 - 181/98)  BP(mean): --  RR: 18 (03 Dec 2021 08:56) (18 - 18)  SpO2: 94% (03 Dec 2021 08:56) (94% - 94%)    PHYSICAL EXAM:    GENERAL: NAD, well-groomed, well-developed  HEAD:  Atraumatic, Normocephalic  EYES: EOMI, PERRLA, conjunctiva and sclera clear  ENMT: Moist mucous membranes  NECK: Supple, No JVD  RESPIRATORY: Clear to auscultation bilaterally; No rales, rhonchi, wheezing, or rubs  CARDIOVASCULAR: Regular rate and rhythm; No murmurs, rubs, or gallops  GASTROINTESTINAL: Soft, Nontender, obese; Bowel sounds hypoactive   GENITOURINARY: (+) shukla   EXTREMITIES:  2+ Peripheral Pulses, No clubbing, cyanosis, or edema  NERVOUS SYSTEM:  Alert & Oriented X3; Moving all 4 extremities; No gross sensory deficits  HEME/LYMPH: No lymphadenopathy noted  SKIN: No rashes or lesions; Incisions C/D/I    LABS:    Complete Blood Count (11.23.21 @ 20:13)   Nucleated RBC: 0 /100 WBCs   WBC Count: 11.66 K/uL   RBC Count: 5.91 M/uL   Hemoglobin: 16.4 g/dL   Hematocrit: 50.6 %   Mean Cell Volume: 85.6 fL   Mean Cell Hemoglobin: 27.7 pg   Mean Cell Hemoglobin Conc: 32.4 gm/dL   Red Cell Distrib Width: 15.5 %   Platelet Count - Automated: 309 K/uL   Nucleated RBC #: 0.00 K/uL Basic Metabolic Panel (11.23.21 @ 20:13)   Sodium, Serum: 137 mmol/L   Potassium, Serum: 4.2 mmol/L   Chloride, Serum: 97 mmol/L   Carbon Dioxide, Serum: 28 mmol/L   Anion Gap, Serum: 12 mmol/L   Blood Urea Nitrogen, Serum: 22 mg/dL   Creatinine, Serum: 1.32 mg/dL   Glucose, Serum: 90 mg/dL   Calcium, Total Serum: 9.6 mg/dL           CAPILLARY BLOOD GLUCOSE          RADIOLOGY & ADDITIONAL STUDIES:    EKG:   Personally Reviewed:  [x ] YES  NSR, (+) right axis deviation, QTc 480's     Imaging:   Personally Reviewed:  [ ] YES               Consultant(s) notes reviewed:    Care Discussed with Consultant(s)/Other Providers:    Advanced Directives: [ ] DNR  [ ] No feeding tube  [ ] MOLST in chart  [ ] MOLST completed today  [x ] Unknown

## 2021-12-03 NOTE — CONSULT NOTE ADULT - ASSESSMENT
51 yrs old male with h/o HTN, prediabetic, s/p left radical nephrectomy, 9/3/21, recent dx of right renal mass admitted for right open partial nephrectomy today. S/P OR, POD#0

## 2021-12-03 NOTE — PATIENT PROFILE ADULT - FALL HARM RISK - UNIVERSAL INTERVENTIONS
Bed in lowest position, wheels locked, appropriate side rails in place/Call bell, personal items and telephone in reach/Instruct patient to call for assistance before getting out of bed or chair/Non-slip footwear when patient is out of bed/[place] to call system/Physically safe environment - no spills, clutter or unnecessary equipment/Purposeful Proactive Rounding/Room/bathroom lighting operational, light cord in reach

## 2021-12-03 NOTE — CONSULT NOTE ADULT - PROBLEM SELECTOR RECOMMENDATION 3
Creat 1.32 preop. Anticipate elevated creat due to partial nephrectomy in a pt with solitary kidney  -Monitor urine output  -Check BMP   -F/U BUN/Creat  -Consider renal consult if Creat significantly elevated.  -Avoid nephrotoxic agents

## 2021-12-03 NOTE — CHART NOTE - NSCHARTNOTEFT_GEN_A_CORE
Post op Check: 50 yo M POD #0 open right partial nephrectomy    Pt seen and examined, has incisional pain, slight nausea, no vomiting.  Denies SOB/CP.     Vital Signs Last 24 Hrs  T(C): 37.1 (03 Dec 2021 16:52), Max: 37.1 (03 Dec 2021 16:52)  T(F): 98.8 (03 Dec 2021 16:52), Max: 98.8 (03 Dec 2021 16:52)  HR: 99 (03 Dec 2021 16:52) (97 - 100)  BP: 162/91 (03 Dec 2021 16:52) (130/87 - 181/98)  BP(mean): 98 (03 Dec 2021 15:00) (91 - 98)  RR: 17 (03 Dec 2021 16:52) (17 - 19)  SpO2: 97% (03 Dec 2021 16:52) (93% - 100%)    I&O's Summary  Elian: 1625 yellow  MARIELLA: 155  03 Dec 2021 07:01  -  03 Dec 2021 17:30  --------------------------------------------------------  IN: 450 mL / OUT: 1780 mL / NET: -1330 mL        Physical Exam  Gen: NAD  Pulm: No respiratory distress, clear to auscultation  CV: Reg  Abd: Dressing c/d/i, obese, soft, NT, ND  : Plummer secured  Venodynes: In place                          13.3   14.65 )-----------( 226      ( 03 Dec 2021 14:55 )             42.1       12-03    135  |  102  |  25<H>  ----------------------------<  157<H>  5.1   |  24  |  1.59<H>    Ca    8.3<L>      03 Dec 2021 14:55  Phos  3.1     12-03  Mg     1.80     12-03          Plan:   IVF: LR @ 150  Diet: NPO  Labs: Reviewed, AM ordered  Abx: Ancef  Continue PCEA  Strict I&O's  Analgesia and antiemetics as needed  DVT prophylaxis/OOB  Incentive spirometry

## 2021-12-04 LAB
ANION GAP SERPL CALC-SCNC: 10 MMOL/L — SIGNIFICANT CHANGE UP (ref 7–14)
APTT BLD: 27.7 SEC — SIGNIFICANT CHANGE UP (ref 27–36.3)
BUN SERPL-MCNC: 25 MG/DL — HIGH (ref 7–23)
CALCIUM SERPL-MCNC: 8.9 MG/DL — SIGNIFICANT CHANGE UP (ref 8.4–10.5)
CHLORIDE SERPL-SCNC: 100 MMOL/L — SIGNIFICANT CHANGE UP (ref 98–107)
CO2 SERPL-SCNC: 27 MMOL/L — SIGNIFICANT CHANGE UP (ref 22–31)
CREAT SERPL-MCNC: 1.59 MG/DL — HIGH (ref 0.5–1.3)
GLUCOSE SERPL-MCNC: 124 MG/DL — HIGH (ref 70–99)
HCT VFR BLD CALC: 46.6 % — SIGNIFICANT CHANGE UP (ref 39–50)
HGB BLD-MCNC: 13.8 G/DL — SIGNIFICANT CHANGE UP (ref 13–17)
INR BLD: 1.04 RATIO — SIGNIFICANT CHANGE UP (ref 0.88–1.16)
MAGNESIUM SERPL-MCNC: 2.1 MG/DL — SIGNIFICANT CHANGE UP (ref 1.6–2.6)
MCHC RBC-ENTMCNC: 27.5 PG — SIGNIFICANT CHANGE UP (ref 27–34)
MCHC RBC-ENTMCNC: 29.6 GM/DL — LOW (ref 32–36)
MCV RBC AUTO: 93 FL — SIGNIFICANT CHANGE UP (ref 80–100)
NRBC # BLD: 0 /100 WBCS — SIGNIFICANT CHANGE UP
NRBC # FLD: 0 K/UL — SIGNIFICANT CHANGE UP
PHOSPHATE SERPL-MCNC: 4 MG/DL — SIGNIFICANT CHANGE UP (ref 2.5–4.5)
PLATELET # BLD AUTO: 226 K/UL — SIGNIFICANT CHANGE UP (ref 150–400)
POTASSIUM SERPL-MCNC: 5.1 MMOL/L — SIGNIFICANT CHANGE UP (ref 3.5–5.3)
POTASSIUM SERPL-SCNC: 5.1 MMOL/L — SIGNIFICANT CHANGE UP (ref 3.5–5.3)
PROTHROM AB SERPL-ACNC: 11.8 SEC — SIGNIFICANT CHANGE UP (ref 10.6–13.6)
RBC # BLD: 5.01 M/UL — SIGNIFICANT CHANGE UP (ref 4.2–5.8)
RBC # FLD: 15.9 % — HIGH (ref 10.3–14.5)
SODIUM SERPL-SCNC: 137 MMOL/L — SIGNIFICANT CHANGE UP (ref 135–145)
WBC # BLD: 13.14 K/UL — HIGH (ref 3.8–10.5)
WBC # FLD AUTO: 13.14 K/UL — HIGH (ref 3.8–10.5)

## 2021-12-04 PROCEDURE — 99232 SBSQ HOSP IP/OBS MODERATE 35: CPT

## 2021-12-04 RX ORDER — SODIUM CHLORIDE 9 MG/ML
1000 INJECTION, SOLUTION INTRAVENOUS
Refills: 0 | Status: DISCONTINUED | OUTPATIENT
Start: 2021-12-04 | End: 2021-12-07

## 2021-12-04 RX ORDER — METOPROLOL TARTRATE 50 MG
25 TABLET ORAL DAILY
Refills: 0 | Status: DISCONTINUED | OUTPATIENT
Start: 2021-12-04 | End: 2021-12-07

## 2021-12-04 RX ADMIN — Medication 25 MILLIGRAM(S): at 13:47

## 2021-12-04 RX ADMIN — Medication 975 MILLIGRAM(S): at 01:25

## 2021-12-04 RX ADMIN — SENNA PLUS 2 TABLET(S): 8.6 TABLET ORAL at 21:27

## 2021-12-04 RX ADMIN — Medication 100 MILLIGRAM(S): at 13:36

## 2021-12-04 RX ADMIN — Medication 975 MILLIGRAM(S): at 06:46

## 2021-12-04 RX ADMIN — Medication 100 MILLIGRAM(S): at 06:16

## 2021-12-04 RX ADMIN — HEPARIN SODIUM 5000 UNIT(S): 5000 INJECTION INTRAVENOUS; SUBCUTANEOUS at 21:28

## 2021-12-04 RX ADMIN — SODIUM CHLORIDE 125 MILLILITER(S): 9 INJECTION, SOLUTION INTRAVENOUS at 08:26

## 2021-12-04 RX ADMIN — Medication 975 MILLIGRAM(S): at 00:55

## 2021-12-04 RX ADMIN — Medication 1 TABLET(S): at 11:55

## 2021-12-04 RX ADMIN — AMLODIPINE BESYLATE 10 MILLIGRAM(S): 2.5 TABLET ORAL at 06:16

## 2021-12-04 RX ADMIN — Medication 975 MILLIGRAM(S): at 11:55

## 2021-12-04 RX ADMIN — LIDOCAINE 1 PATCH: 4 CREAM TOPICAL at 17:30

## 2021-12-04 RX ADMIN — SODIUM CHLORIDE 125 MILLILITER(S): 9 INJECTION, SOLUTION INTRAVENOUS at 21:30

## 2021-12-04 RX ADMIN — HEPARIN SODIUM 5000 UNIT(S): 5000 INJECTION INTRAVENOUS; SUBCUTANEOUS at 13:36

## 2021-12-04 RX ADMIN — HEPARIN SODIUM 5000 UNIT(S): 5000 INJECTION INTRAVENOUS; SUBCUTANEOUS at 06:16

## 2021-12-04 RX ADMIN — LIDOCAINE 1 PATCH: 4 CREAM TOPICAL at 19:00

## 2021-12-04 RX ADMIN — Medication 975 MILLIGRAM(S): at 17:18

## 2021-12-04 RX ADMIN — POLYETHYLENE GLYCOL 3350 17 GRAM(S): 17 POWDER, FOR SOLUTION ORAL at 11:55

## 2021-12-04 RX ADMIN — Medication 100 MILLIGRAM(S): at 21:34

## 2021-12-04 RX ADMIN — LIDOCAINE 1 PATCH: 4 CREAM TOPICAL at 06:00

## 2021-12-04 RX ADMIN — Medication 975 MILLIGRAM(S): at 06:16

## 2021-12-04 NOTE — PHYSICAL THERAPY INITIAL EVALUATION ADULT - PATIENT PROFILE REVIEW, REHAB EVAL
PT orders received: ambulate as tolerated. Consult with JANES FELIPE, patient may participate in PT evaluation./yes

## 2021-12-04 NOTE — PROGRESS NOTE ADULT - ASSESSMENT
51 yrs old male with h/o HTN, prediabetic, s/p left radical nephrectomy, 9/3/21, recent dx of right renal mass admitted for right open partial nephrectomy today. S/P OR, POD#1       Problem/Recommendation - 1:  ·  Problem: Malignant neoplasm of unspecified kidney, except renal pelvis.   ·  Recommendation: S/P right partial nephrectomy, POD#1  -Management as per   -Pain control - on pca  -Bowel regimen - no bm yet, monitor   -Incentive spirometer  -OOB and ambulate  -DVT prophylaxis.  - diet per   - ppx abx per  - trend wbc      Problem/Recommendation - 2:  ·  Problem: Hypertension.   ·  Recommendation: BP elevated on Losartan at home.  -D/C Losartan in the setting of anticipated VEENA  -Amlodipine 10mg PO daily  running a bit high still - will add toprol xl 25 mg daily   -Monitor BP  -Hydralazine PRN for SBP > 180.     Problem/Recommendation - 3:  ·  Problem: VEENA (acute kidney injury).   ·  Recommendation: Creat 1.32 preop. Anticipate elevated creat due to partial nephrectomy in a pt with solitary kidney  -Monitor urine output  -Check BMP   -F/U BUN/Creat - today 1.59 - overall stable   -Consider renal consult if Creat significantly elevated.  -Avoid nephrotoxic agents.     Problem/Recommendation - 4:  ·  Problem: History of prediabetes.   ·  Recommendation: Outpatient f/u  - check A1C.     Problem/Recommendation - 5:  ·  Problem: DVT prophylaxis.   ·  Recommendation: SQ heparin.    carola

## 2021-12-04 NOTE — PROGRESS NOTE ADULT - ASSESSMENT
52 yo M s/p open right partial nephrectomy 12/3/2021    12/4 - pain controlled, mild nausea resolved, no vomiting, no flatus yet, not OOB yet, per medicine losartan held, amlodipine started yesterday    Plan:  -AM CBC reviewed, f/u BMP  -continue IVF  -continue shukla  -continue PCEA, f/u pain service  -continue ancef  -PT consult  -f/u medicine  -DVT prophy, IS, OOB, ambulate

## 2021-12-04 NOTE — PROGRESS NOTE ADULT - SUBJECTIVE AND OBJECTIVE BOX
Patient is a 51y old  Male who presents with a chief complaint of "excision of lesion right kidney" (23 Nov 2021 17:02)      SUBJECTIVE / OVERNIGHT EVENTS: no events - no flatus yet -      ROS:  No HA/DZ  No Vision changes   No CP, SOB  No N/V/D  No Edema  No Rash  NO weakness, numbness    MEDICATIONS  (STANDING):  acetaminophen     Tablet .. 975 milliGRAM(s) Oral every 6 hours  amLODIPine   Tablet 10 milliGRAM(s) Oral daily  ceFAZolin   IVPB 2000 milliGRAM(s) IV Intermittent every 8 hours  heparin   Injectable 5000 Unit(s) SubCutaneous every 8 hours  hydromorphone (10 MICROgram(s)/mL) + bupivacaine 0.0625% in 0.9% Sodium Chloride PCEA 250 milliLiter(s) Epidural PCA Continuous  lactated ringers. 1000 milliLiter(s) (125 mL/Hr) IV Continuous <Continuous>  lactobacillus acidophilus 1 Tablet(s) Oral daily  lidocaine   4% Patch 1 Patch Transdermal every 24 hours  polyethylene glycol 3350 17 Gram(s) Oral daily  senna 2 Tablet(s) Oral at bedtime    MEDICATIONS  (PRN):  hydromorphone (10 MICROgram(s)/mL) + bupivacaine 0.0625% in 0.9% Sodium Chloride PCEA Rescue Clinician  Bolus 5 milliLiter(s) Epidural every 15 minutes PRN for Pain Score greater than 6  naloxone Injectable 0.1 milliGRAM(s) IV Push every 3 minutes PRN For ANY of the following changes in patient status:  A. RR LESS THAN 10 breaths per minute, B. Oxygen saturation LESS THAN 90%, C. Sedation score of 6  ondansetron Injectable 4 milliGRAM(s) IV Push every 6 hours PRN Nausea      T(C): 36.6 (12-04-21 @ 10:00)  HR: 95 (12-04-21 @ 10:00)  BP: 159/77 (12-04-21 @ 10:00)  RR: 17 (12-04-21 @ 10:00)  SpO2: 98% (12-04-21 @ 10:00)  CAPILLARY BLOOD GLUCOSE        I&O's Summary    03 Dec 2021 07:01  -  04 Dec 2021 07:00  --------------------------------------------------------  IN: 1950 mL / OUT: 2760 mL / NET: -810 mL    04 Dec 2021 07:01  -  04 Dec 2021 11:24  --------------------------------------------------------  IN: 0 mL / OUT: 625 mL / NET: -625 mL        PHYSICAL EXAM:  GENERAL: NAD  HEAD:  Atraumatic, Normocephalic  EYES: EOMI, PERRL, conjunctiva and sclera clear  NECK: Supple, No JVD  CHEST/LUNG: Clear to auscultation bilaterally  HEART: Regular rate and rhythm; No murmurs, rubs, or gallops, No Edema  ABDOMEN: Soft, Nontender, Nondistended; Bowel sounds present  EXTREMITIES:  2+ Peripheral Pulses, No clubbing, cyanosis  PSYCH: No SI/HI  NEUROLOGY: non-focal  SKIN: dressing c/d/i   Plummer     LABS:                        13.8   13.14 )-----------( 226      ( 04 Dec 2021 07:16 )             46.6     12-04    137  |  100  |  25<H>  ----------------------------<  124<H>  5.1   |  27  |  1.59<H>    Ca    8.9      04 Dec 2021 07:16  Phos  4.0     12-04  Mg     2.10     12-04      PT/INR - ( 04 Dec 2021 07:16 )   PT: 11.8 sec;   INR: 1.04 ratio         PTT - ( 04 Dec 2021 07:16 )  PTT:27.7 sec              RADIOLOGY & ADDITIONAL TESTS:    Imaging Personally Reviewed:    Consultant(s) Notes Reviewed:      Care Discussed with Consultants/Other Providers:

## 2021-12-04 NOTE — PROGRESS NOTE ADULT - SUBJECTIVE AND OBJECTIVE BOX
Overnight events:  Had some desaturation overnight, placed on 2L    Subjective:  Pt states pain better controlled, had one episode of nausea, now resolved, no vomiting, no flatus yet, not OOB yet    Objective:    Vital signs  T(C): , Max: 37.1 (12-03-21 @ 16:52)  HR: 94 (12-04-21 @ 06:10)  BP: 135/83 (12-04-21 @ 06:10)  SpO2: 97% (12-04-21 @ 06:10)  Wt(kg): --    Output   Gayla: 850 yellow  MARIELLA: 130 SS  12-03 @ 07:01  -  12-04 @ 07:00  --------------------------------------------------------  IN: 1950 mL / OUT: 2760 mL / NET: -810 mL        Gen: NAD  Abd: obese, dresssing c/d/i, softly distended, nontender  : gayla secured    Labs                        13.8   13.14 )-----------( 226      ( 04 Dec 2021 07:16 )             46.6     03 Dec 2021 14:55    135    |  102    |  25     ----------------------------<  157    5.1     |  24     |  1.59     Ca    8.3        03 Dec 2021 14:55  Phos  3.1       03 Dec 2021 14:55  Mg     1.80      03 Dec 2021 14:55

## 2021-12-04 NOTE — PROGRESS NOTE ADULT - SUBJECTIVE AND OBJECTIVE BOX
Anesthesia Pain Management Service: Day _1_ of Epidural    SUBJECTIVE: Patient doing well with PCEA and no problems.  Pain Scale Score: 4/10  Refer to charted pain scores    THERAPY:  [x ] Epidural Bupivacaine 0.0625% and Hydromorphone  		[ X] 10 micrograms/mL	[ ] 5 micrograms/mL  [ ] Epidural Bupivacaine 0.0625% and Fentanyl - 2 micrograms/mL  [ ] Epidural Ropivacaine 0.1% plain – 1 mg/mL  [ ] Patient Controlled Regional Anesthesia (PCRA) Ropivacaine  		[ ] 0.2%			[ ] 0.1%    Demand dose __3_ lockout __15_ (minutes) Continuous Rate __6_ Total: __98.3__ ml used (in past 24 hours)      MEDICATIONS  (STANDING):  acetaminophen     Tablet .. 975 milliGRAM(s) Oral every 6 hours  amLODIPine   Tablet 10 milliGRAM(s) Oral daily  ceFAZolin   IVPB 2000 milliGRAM(s) IV Intermittent every 8 hours  heparin   Injectable 5000 Unit(s) SubCutaneous every 8 hours  hydromorphone (10 MICROgram(s)/mL) + bupivacaine 0.0625% in 0.9% Sodium Chloride PCEA 250 milliLiter(s) Epidural PCA Continuous  lactated ringers. 1000 milliLiter(s) (125 mL/Hr) IV Continuous <Continuous>  lactobacillus acidophilus 1 Tablet(s) Oral daily  lidocaine   4% Patch 1 Patch Transdermal every 24 hours  metoprolol succinate ER 25 milliGRAM(s) Oral daily  polyethylene glycol 3350 17 Gram(s) Oral daily  senna 2 Tablet(s) Oral at bedtime    MEDICATIONS  (PRN):  hydromorphone (10 MICROgram(s)/mL) + bupivacaine 0.0625% in 0.9% Sodium Chloride PCEA Rescue Clinician  Bolus 5 milliLiter(s) Epidural every 15 minutes PRN for Pain Score greater than 6  naloxone Injectable 0.1 milliGRAM(s) IV Push every 3 minutes PRN For ANY of the following changes in patient status:  A. RR LESS THAN 10 breaths per minute, B. Oxygen saturation LESS THAN 90%, C. Sedation score of 6  ondansetron Injectable 4 milliGRAM(s) IV Push every 6 hours PRN Nausea      OBJECTIVE: Patient laying in bed.    Assessment of Catheter Site:	[ ] Left	[ ] Right  [x ] Epidural 	[ ] Femoral	      [ ] Saphenous   [ ] Supraclavicular   [ ] Other:    [x ] Dressing intact	[x ] Site non-tender	[ x] Site without erythema, discharge, edema  [x ] Epidural tubing and connection checked	[x] Gross neurological exam within normal limits  [ ] Catheter removed – tip intact		[ ] Afebrile  	[ ] Febrile: ___   [ X] see Temp under VS below)    PT/INR - ( 04 Dec 2021 07:16 )   PT: 11.8 sec;   INR: 1.04 ratio         PTT - ( 04 Dec 2021 07:16 )  PTT:27.7 sec                      13.8   13.14 )-----------( 226      ( 04 Dec 2021 07:16 )             46.6     Vital Signs Last 24 Hrs  T(C): 36.8 (12-04-21 @ 13:44), Max: 37.1 (12-03-21 @ 16:52)  T(F): 98.3 (12-04-21 @ 13:44), Max: 98.8 (12-03-21 @ 16:52)  HR: 92 (12-04-21 @ 13:44) (92 - 100)  BP: 151/70 (12-04-21 @ 13:44) (130/87 - 162/91)  BP(mean): 98 (12-03-21 @ 15:00) (91 - 98)  RR: 17 (12-04-21 @ 13:44) (17 - 19)  SpO2: 97% (12-04-21 @ 13:44) (93% - 100%)      Sedation Score:	[x ] Alert	[ ] Drowsy	[ ] Arousable	[ ] Asleep	[ ] Unresponsive    Side Effects:	[x ] None	[ ] Nausea	[ ] Vomiting	[ ] Pruritus  		[ ] Weakness		[ ] Numbness	[ ] Other:    ASSESSMENT/ PLAN:    Therapy to  be:	[x ] Continue   [ ] Discontinued   [ ] Change to prn Analgesics    Documentation and Verification of current medications:  [ X ] Done	[ ] Not done, not eligible, reason:    Comments: Doing OK with epidural and may continue.    Progress Note written now but Patient was seen earlier.

## 2021-12-04 NOTE — PHYSICAL THERAPY INITIAL EVALUATION ADULT - GENERAL OBSERVATIONS, REHAB EVAL
Pt received semisupine in bed, +PCEA/IV, +shukla, +2L oxygen via nasal cannula, in NAD. Pt agreeable to participate in PT evaluation.

## 2021-12-04 NOTE — PROGRESS NOTE ADULT - SUBJECTIVE AND OBJECTIVE BOX
Anesthesia Pain Management Service: Day _1_ of Epidural    SUBJECTIVE: Patient doing well with PCEA and no problems.  Pain Scale Score: 4/10  Refer to charted pain scores    THERAPY:  [x ] Epidural Bupivacaine 0.0625% and Hydromorphone  		[ X] 10 micrograms/mL	[ ] 5 micrograms/mL  [ ] Epidural Bupivacaine 0.0625% and Fentanyl - 2 micrograms/mL  [ ] Epidural Ropivacaine 0.1% plain – 1 mg/mL  [ ] Patient Controlled Regional Anesthesia (PCRA) Ropivacaine  		[ ] 0.2%			[ ] 0.1%    Demand dose __3_ lockout __15_ (minutes) Continuous Rate __6_ Total: __98.3__ ml used (in past 24 hours)      MEDICATIONS  (STANDING):  acetaminophen     Tablet .. 975 milliGRAM(s) Oral every 6 hours  amLODIPine   Tablet 10 milliGRAM(s) Oral daily  ceFAZolin   IVPB 2000 milliGRAM(s) IV Intermittent every 8 hours  heparin   Injectable 5000 Unit(s) SubCutaneous every 8 hours  hydromorphone (10 MICROgram(s)/mL) + bupivacaine 0.0625% in 0.9% Sodium Chloride PCEA 250 milliLiter(s) Epidural PCA Continuous  lactated ringers. 1000 milliLiter(s) (125 mL/Hr) IV Continuous <Continuous>  lactobacillus acidophilus 1 Tablet(s) Oral daily  lidocaine   4% Patch 1 Patch Transdermal every 24 hours  metoprolol succinate ER 25 milliGRAM(s) Oral daily  polyethylene glycol 3350 17 Gram(s) Oral daily  senna 2 Tablet(s) Oral at bedtime    MEDICATIONS  (PRN):  hydromorphone (10 MICROgram(s)/mL) + bupivacaine 0.0625% in 0.9% Sodium Chloride PCEA Rescue Clinician  Bolus 5 milliLiter(s) Epidural every 15 minutes PRN for Pain Score greater than 6  naloxone Injectable 0.1 milliGRAM(s) IV Push every 3 minutes PRN For ANY of the following changes in patient status:  A. RR LESS THAN 10 breaths per minute, B. Oxygen saturation LESS THAN 90%, C. Sedation score of 6  ondansetron Injectable 4 milliGRAM(s) IV Push every 6 hours PRN Nausea      OBJECTIVE: Patient laying in bed.    Assessment of Catheter Site:	[ ] Left	[ ] Right  [x ] Epidural 	[ ] Femoral	      [ ] Saphenous   [ ] Supraclavicular   [ ] Other:    [x ] Dressing intact	[x ] Site non-tender	[ x] Site without erythema, discharge, edema  [x ] Epidural tubing and connection checked	[x] Gross neurological exam within normal limits  [ ] Catheter removed – tip intact		[ ] Afebrile  	[ ] Febrile: ___   [ X] see Temp under VS below)    PT/INR - ( 04 Dec 2021 07:16 )   PT: 11.8 sec;   INR: 1.04 ratio         PTT - ( 04 Dec 2021 07:16 )  PTT:27.7 sec                      13.8   13.14 )-----------( 226      ( 04 Dec 2021 07:16 )             46.6     Vital Signs Last 24 Hrs  T(C): 36.6 (12-04-21 @ 10:00), Max: 37.1 (12-03-21 @ 16:52)  T(F): 97.8 (12-04-21 @ 10:00), Max: 98.8 (12-03-21 @ 16:52)  HR: 95 (12-04-21 @ 10:00) (94 - 100)  BP: 159/77 (12-04-21 @ 10:00) (130/87 - 162/91)  BP(mean): 98 (12-03-21 @ 15:00) (91 - 98)  RR: 17 (12-04-21 @ 10:00) (17 - 19)  SpO2: 98% (12-04-21 @ 10:00) (93% - 100%)      Sedation Score:	[x ] Alert	[ ] Drowsy	[ ] Arousable	[ ] Asleep	[ ] Unresponsive    Side Effects:	[x ] None	[ ] Nausea	[ ] Vomiting	[ ] Pruritus  		[ ] Weakness		[ ] Numbness	[ ] Other:    ASSESSMENT/ PLAN:    Therapy to  be:	[x ] Continue   [ ] Discontinued   [ ] Change to prn Analgesics    Documentation and Verification of current medications:  [ X ] Done	[ ] Not done, not eligible, reason:    Comments: Doing OK with epidural and may continue.    Progress Note written now but Patient was seen earlier.

## 2021-12-04 NOTE — PHYSICAL THERAPY INITIAL EVALUATION ADULT - PERTINENT HX OF CURRENT PROBLEM, REHAB EVAL
Pt is a 51 year old male presenting with a pre-operative diagnosis of right renal mass. Pt is a 51 year old male presenting with a pre-operative diagnosis of right renal mass. Pt POD#1 s/p above listed surgery. PMH listed below.

## 2021-12-04 NOTE — PHYSICAL THERAPY INITIAL EVALUATION ADULT - ADDITIONAL COMMENTS
Pt lives in an apartment with no stairs to negotiate. Prior to admission, pt ambulated independently, no assistive device.    Pt was left seated in bedside chair, all lines/tubes intact and call bell within reach, RN aware.

## 2021-12-05 LAB
A1C WITH ESTIMATED AVERAGE GLUCOSE RESULT: 6.3 % — HIGH (ref 4–5.6)
ANION GAP SERPL CALC-SCNC: 11 MMOL/L — SIGNIFICANT CHANGE UP (ref 7–14)
APTT BLD: 26.6 SEC — LOW (ref 27–36.3)
BUN SERPL-MCNC: 19 MG/DL — SIGNIFICANT CHANGE UP (ref 7–23)
CALCIUM SERPL-MCNC: 9 MG/DL — SIGNIFICANT CHANGE UP (ref 8.4–10.5)
CHLORIDE SERPL-SCNC: 98 MMOL/L — SIGNIFICANT CHANGE UP (ref 98–107)
CO2 SERPL-SCNC: 26 MMOL/L — SIGNIFICANT CHANGE UP (ref 22–31)
CREAT SERPL-MCNC: 1.26 MG/DL — SIGNIFICANT CHANGE UP (ref 0.5–1.3)
ESTIMATED AVERAGE GLUCOSE: 134 — SIGNIFICANT CHANGE UP
GLUCOSE SERPL-MCNC: 107 MG/DL — HIGH (ref 70–99)
HCT VFR BLD CALC: 44.7 % — SIGNIFICANT CHANGE UP (ref 39–50)
HGB BLD-MCNC: 13.8 G/DL — SIGNIFICANT CHANGE UP (ref 13–17)
INR BLD: 1.13 RATIO — SIGNIFICANT CHANGE UP (ref 0.88–1.16)
MCHC RBC-ENTMCNC: 27.2 PG — SIGNIFICANT CHANGE UP (ref 27–34)
MCHC RBC-ENTMCNC: 30.9 GM/DL — LOW (ref 32–36)
MCV RBC AUTO: 88.2 FL — SIGNIFICANT CHANGE UP (ref 80–100)
NRBC # BLD: 0 /100 WBCS — SIGNIFICANT CHANGE UP
NRBC # FLD: 0 K/UL — SIGNIFICANT CHANGE UP
PLATELET # BLD AUTO: 220 K/UL — SIGNIFICANT CHANGE UP (ref 150–400)
POTASSIUM SERPL-MCNC: 4.2 MMOL/L — SIGNIFICANT CHANGE UP (ref 3.5–5.3)
POTASSIUM SERPL-SCNC: 4.2 MMOL/L — SIGNIFICANT CHANGE UP (ref 3.5–5.3)
PROTHROM AB SERPL-ACNC: 12.9 SEC — SIGNIFICANT CHANGE UP (ref 10.6–13.6)
RBC # BLD: 5.07 M/UL — SIGNIFICANT CHANGE UP (ref 4.2–5.8)
RBC # FLD: 16.1 % — HIGH (ref 10.3–14.5)
SODIUM SERPL-SCNC: 135 MMOL/L — SIGNIFICANT CHANGE UP (ref 135–145)
WBC # BLD: 11.49 K/UL — HIGH (ref 3.8–10.5)
WBC # FLD AUTO: 11.49 K/UL — HIGH (ref 3.8–10.5)

## 2021-12-05 PROCEDURE — 99232 SBSQ HOSP IP/OBS MODERATE 35: CPT

## 2021-12-05 RX ADMIN — AMLODIPINE BESYLATE 10 MILLIGRAM(S): 2.5 TABLET ORAL at 05:37

## 2021-12-05 RX ADMIN — SENNA PLUS 2 TABLET(S): 8.6 TABLET ORAL at 21:57

## 2021-12-05 RX ADMIN — LIDOCAINE 1 PATCH: 4 CREAM TOPICAL at 17:28

## 2021-12-05 RX ADMIN — Medication 1 TABLET(S): at 12:23

## 2021-12-05 RX ADMIN — LIDOCAINE 1 PATCH: 4 CREAM TOPICAL at 23:17

## 2021-12-05 RX ADMIN — SODIUM CHLORIDE 125 MILLILITER(S): 9 INJECTION, SOLUTION INTRAVENOUS at 09:09

## 2021-12-05 RX ADMIN — Medication 100 MILLIGRAM(S): at 15:49

## 2021-12-05 RX ADMIN — Medication 25 MILLIGRAM(S): at 05:37

## 2021-12-05 RX ADMIN — Medication 100 MILLIGRAM(S): at 05:37

## 2021-12-05 RX ADMIN — HEPARIN SODIUM 5000 UNIT(S): 5000 INJECTION INTRAVENOUS; SUBCUTANEOUS at 15:34

## 2021-12-05 RX ADMIN — Medication 100 MILLIGRAM(S): at 21:59

## 2021-12-05 RX ADMIN — HEPARIN SODIUM 5000 UNIT(S): 5000 INJECTION INTRAVENOUS; SUBCUTANEOUS at 21:57

## 2021-12-05 RX ADMIN — HEPARIN SODIUM 5000 UNIT(S): 5000 INJECTION INTRAVENOUS; SUBCUTANEOUS at 05:37

## 2021-12-05 RX ADMIN — POLYETHYLENE GLYCOL 3350 17 GRAM(S): 17 POWDER, FOR SOLUTION ORAL at 12:21

## 2021-12-05 RX ADMIN — Medication 975 MILLIGRAM(S): at 00:00

## 2021-12-05 NOTE — PROGRESS NOTE ADULT - SUBJECTIVE AND OBJECTIVE BOX
Anesthesia Pain Management Service: Day 2__ of Epidural    SUBJECTIVE: Patient doing well with PCEA and no problems.  Pain Scale Score:   Refer to charted pain scores    THERAPY:  [x ] Epidural Bupivacaine 0.0625% and Hydromorphone  		[ x] 10 micrograms/mL	[ ] 5 micrograms/mL  [ ] Epidural Bupivacaine 0.0625% and Fentanyl - 2 micrograms/mL  [ ] Epidural Ropivacaine 0.1% plain – 1 mg/mL  [ ] Patient Controlled Regional Anesthesia (PCRA) Ropivacaine  		[ ] 0.2%			[ ] 0.1%    Demand dose __3_ lockout __15_ (minutes) Continuous Rate _6__ Total: 123.5cc___ Daily      MEDICATIONS  (STANDING):  amLODIPine   Tablet 10 milliGRAM(s) Oral daily  ceFAZolin   IVPB 2000 milliGRAM(s) IV Intermittent every 8 hours  heparin   Injectable 5000 Unit(s) SubCutaneous every 8 hours  hydromorphone (10 MICROgram(s)/mL) + bupivacaine 0.0625% in 0.9% Sodium Chloride PCEA 250 milliLiter(s) Epidural PCA Continuous  lactated ringers. 1000 milliLiter(s) (125 mL/Hr) IV Continuous <Continuous>  lactobacillus acidophilus 1 Tablet(s) Oral daily  lidocaine   4% Patch 1 Patch Transdermal every 24 hours  metoprolol succinate ER 25 milliGRAM(s) Oral daily  polyethylene glycol 3350 17 Gram(s) Oral daily  senna 2 Tablet(s) Oral at bedtime    MEDICATIONS  (PRN):  hydromorphone (10 MICROgram(s)/mL) + bupivacaine 0.0625% in 0.9% Sodium Chloride PCEA Rescue Clinician  Bolus 5 milliLiter(s) Epidural every 15 minutes PRN for Pain Score greater than 6  naloxone Injectable 0.1 milliGRAM(s) IV Push every 3 minutes PRN For ANY of the following changes in patient status:  A. RR LESS THAN 10 breaths per minute, B. Oxygen saturation LESS THAN 90%, C. Sedation score of 6  ondansetron Injectable 4 milliGRAM(s) IV Push every 6 hours PRN Nausea      OBJECTIVE:    Assessment of Catheter Site:	[ ] Left	[ ] Right  [x ] Epidural 	[ ] Femoral	      [ ] Saphenous   [ ] Supraclavicular   [ ] Other:    [x ] Dressing intact	[x ] Site non-tender	[ x] Site without erythema, discharge, edema  [x ] Epidural tubing and connection checked	[x] Gross neurological exam within normal limits  [ ] Catheter removed – tip intact		[x ] Afebrile	[ ] Febrile: ___    PT/INR - ( 05 Dec 2021 08:19 )   PT: 12.9 sec;   INR: 1.13 ratio         PTT - ( 05 Dec 2021 08:19 )  PTT:26.6 sec                      13.8   11.49 )-----------( 220      ( 05 Dec 2021 08:19 )             44.7     Vital Signs Last 24 Hrs  T(C): 36.6 (12-05-21 @ 18:27), Max: 37.1 (12-05-21 @ 09:00)  T(F): 97.9 (12-05-21 @ 18:27), Max: 98.7 (12-05-21 @ 09:00)  HR: 98 (12-05-21 @ 18:27) (95 - 104)  BP: 157/83 (12-05-21 @ 18:27) (145/75 - 157/83)  BP(mean): --  RR: 19 (12-05-21 @ 18:27) (18 - 20)  SpO2: 95% (12-05-21 @ 18:27) (94% - 97%)      Sedation Score:	[x ] Alert	[ ] Drowsy	[ ] Arousable	[ ] Asleep	[ ] Unresponsive    Side Effects:	[x ] None	[ ] Nausea	[ ] Vomiting	[ ] Pruritus  		[ ] Weakness		[ ] Numbness	[ ] Other:    ASSESSMENT/ PLAN:    Therapy to  be:	[x ] Continue   [ ] Discontinued   [ ] Change to prn Analgesics    Documentation and Verification of current medications:  [ X ] Done	[ ] Not done, not eligible, reason:    Comments: Doing OK with epidural and may continue.

## 2021-12-05 NOTE — DIETITIAN INITIAL EVALUATION ADULT. - CHIEF COMPLAINT
51 yrs old male with h/o HTN, prediabetic, s/p left radical nephrectomy, 9/3/21, recent dx of right renal mass admitted for right open partial nephrectomy today. S/P OR, POD#1

## 2021-12-05 NOTE — PROGRESS NOTE ADULT - ASSESSMENT
52 yo M s/p open right partial nephrectomy 12/3/2021    12/4 - pain controlled, mild nausea resolved, no vomiting, no flatus yet, not OOB yet, per medicine losartan held, amlodipine started yesterday  12/5 - moderate pain, without nausea or vomiting, no flatus yet    Plan:  -AM CBC, BMP reviewed  -continue IVF  -continue Plummer  -continue PCEA, f/u pain service  -continue Ancef  -f/u medicine, pain, PT  -DVT prophy, IS, OOB, ambulate

## 2021-12-05 NOTE — DIETITIAN INITIAL EVALUATION ADULT. - PERTINENT LABORATORY DATA
12-05 Na135 mmol/L Glu 107 mg/dL<H> K+ 4.2 mmol/L Cr  1.26 mg/dL BUN 19 mg/dL 12-04 Phos 4.0 mg/dL    A1C with Estimated Average Glucose in AM (12.05.21 @ 08:19)    A1C with Estimated Average Glucose Result: 6.3: High Risk (prediabetic)    5.7 - 6.4 %  Diabetic, diagnostic           > 6.5 %  ADA diabetic treatment goal    < 7.0 %  HbA1C values may not accurately reflect mean blood glucose in patients  with Hb variants.  Suggest clinical correlation. %    Estimated Average Glucose: 134

## 2021-12-05 NOTE — CHART NOTE - NSCHARTNOTEFT_GEN_A_CORE
Called by RN to evaluate for persistent pain despite epidural. Evaluated patient bedside. Pain is around incision site, 10/10 in intensity that worsens with movement. Epidural site examined and appears intact. Administered a rescue clinician bolus of 5cc via the epidural and increased epidural rate to 8cc/hour.

## 2021-12-05 NOTE — DIETITIAN INITIAL EVALUATION ADULT. - CONTINUE CURRENT NUTRITION CARE PLAN
Advance diet as tolerated; would suggest DASH/TLC (cholesterol and sodium restricted), Consistent Carbohydrate once diet can be advanced; further diet modification in accordance with lab values/yes

## 2021-12-05 NOTE — DIETITIAN INITIAL EVALUATION ADULT. - PERTINENT MEDS FT
MEDICATIONS  (STANDING):  amLODIPine   Tablet 10 milliGRAM(s) Oral daily  ceFAZolin   IVPB 2000 milliGRAM(s) IV Intermittent every 8 hours  heparin   Injectable 5000 Unit(s) SubCutaneous every 8 hours  hydromorphone (10 MICROgram(s)/mL) + bupivacaine 0.0625% in 0.9% Sodium Chloride PCEA 250 milliLiter(s) Epidural PCA Continuous  lactated ringers. 1000 milliLiter(s) (125 mL/Hr) IV Continuous <Continuous>  lactobacillus acidophilus 1 Tablet(s) Oral daily  lidocaine   4% Patch 1 Patch Transdermal every 24 hours  metoprolol succinate ER 25 milliGRAM(s) Oral daily  polyethylene glycol 3350 17 Gram(s) Oral daily  senna 2 Tablet(s) Oral at bedtime    MEDICATIONS  (PRN):  hydromorphone (10 MICROgram(s)/mL) + bupivacaine 0.0625% in 0.9% Sodium Chloride PCEA Rescue Clinician  Bolus 5 milliLiter(s) Epidural every 15 minutes PRN for Pain Score greater than 6  naloxone Injectable 0.1 milliGRAM(s) IV Push every 3 minutes PRN For ANY of the following changes in patient status:  A. RR LESS THAN 10 breaths per minute, B. Oxygen saturation LESS THAN 90%, C. Sedation score of 6  ondansetron Injectable 4 milliGRAM(s) IV Push every 6 hours PRN Nausea

## 2021-12-05 NOTE — PROGRESS NOTE ADULT - ASSESSMENT
51 yrs old male with h/o HTN, prediabetic, s/p left radical nephrectomy, 9/3/21, recent dx of right renal mass admitted for right open partial nephrectomy today. S/P OR, POD#1       Problem/Recommendation - 1:  ·  Problem: Malignant neoplasm of unspecified kidney, except renal pelvis.   ·  Recommendation: S/P right partial nephrectomy, POD#1  -Management as per   -Pain control - on pca  -Bowel regimen - no bm yet, monitor   -Incentive spirometer  -OOB and ambulate  -DVT prophylaxis.  - diet per   - ppx abx per  - trend wbc      Problem/Recommendation - 2:  ·  Problem: Hypertension.   ·  Recommendation: BP elevated on Losartan at home.  -D/C Losartan in the setting of anticipated VEENA  -Amlodipine 10mg PO daily  was running a bit high still - added toprol xl 25 mg daily - better controlled   -Monitor BP  -Hydralazine PRN for SBP > 180.     Problem/Recommendation - 3:  ·  Problem: VEENA (acute kidney injury).   ·  Recommendation: Creat 1.32 preop. Anticipate elevated creat due to partial nephrectomy in a pt with solitary kidney  -Monitor urine output  - renal fx back to baseline      Problem/Recommendation - 4:  ·  Problem: History of prediabetes.   ·  Recommendation: Outpatient f/u  - check A1C.     Problem/Recommendation - 5:  ·  Problem: DVT prophylaxis.   ·  Recommendation: SQ heparin.    carola ANTONIO

## 2021-12-05 NOTE — PROGRESS NOTE ADULT - SUBJECTIVE AND OBJECTIVE BOX
Subjective  Experiencing pain at site of incision. Denies nausea, vomiting. No flatus yet.    Objective    Vital signs  T(F): , Max: 98.5 (12-05-21 @ 01:56)  HR: 95 (12-05-21 @ 05:00)  BP: 145/75 (12-05-21 @ 05:00)  SpO2: 95% (12-05-21 @ 05:00)  Wt(kg): --    Output     OUT:    Bulb (mL): 150 mL    Indwelling Catheter - Urethral (mL): 3000 mL  Total OUT: 3150 mL    Total NET: -3150 mL          Gen: NAD  Abd: obese, dressing removed, incision c/d/i, softly distended, nontender, MARIELLA serosanguinous  : shukla secured    Labs      12-04 @ 07:16    WBC 13.14 / Hct 46.6  / SCr 1.59     12-03 @ 14:55    WBC 14.65 / Hct 42.1  / SCr 1.59

## 2021-12-05 NOTE — PROGRESS NOTE ADULT - SUBJECTIVE AND OBJECTIVE BOX
Patient is a 51y old  Male who presents with a chief complaint of "excision of lesion right kidney" (23 Nov 2021 17:02)      SUBJECTIVE / OVERNIGHT EVENTS: having pain - PCA not controlling, asking for epidural to be removed and start gtt - making good urine     ROS:  No HA/DZ  No Vision changes   No CP, SOB  No N/V/D  No Edema  No Rash  NO weakness, numbness    MEDICATIONS  (STANDING):  amLODIPine   Tablet 10 milliGRAM(s) Oral daily  ceFAZolin   IVPB 2000 milliGRAM(s) IV Intermittent every 8 hours  heparin   Injectable 5000 Unit(s) SubCutaneous every 8 hours  hydromorphone (10 MICROgram(s)/mL) + bupivacaine 0.0625% in 0.9% Sodium Chloride PCEA 250 milliLiter(s) Epidural PCA Continuous  lactated ringers. 1000 milliLiter(s) (125 mL/Hr) IV Continuous <Continuous>  lactobacillus acidophilus 1 Tablet(s) Oral daily  lidocaine   4% Patch 1 Patch Transdermal every 24 hours  metoprolol succinate ER 25 milliGRAM(s) Oral daily  polyethylene glycol 3350 17 Gram(s) Oral daily  senna 2 Tablet(s) Oral at bedtime    MEDICATIONS  (PRN):  hydromorphone (10 MICROgram(s)/mL) + bupivacaine 0.0625% in 0.9% Sodium Chloride PCEA Rescue Clinician  Bolus 5 milliLiter(s) Epidural every 15 minutes PRN for Pain Score greater than 6  naloxone Injectable 0.1 milliGRAM(s) IV Push every 3 minutes PRN For ANY of the following changes in patient status:  A. RR LESS THAN 10 breaths per minute, B. Oxygen saturation LESS THAN 90%, C. Sedation score of 6  ondansetron Injectable 4 milliGRAM(s) IV Push every 6 hours PRN Nausea      T(C): 37.1 (12-05-21 @ 09:00)  HR: 97 (12-05-21 @ 09:00)  BP: 154/88 (12-05-21 @ 09:00)  RR: 18 (12-05-21 @ 09:00)  SpO2: 95% (12-05-21 @ 09:00)  CAPILLARY BLOOD GLUCOSE        I&O's Summary    04 Dec 2021 07:01  -  05 Dec 2021 07:00  --------------------------------------------------------  IN: 1500 mL / OUT: 3150 mL / NET: -1650 mL    05 Dec 2021 07:01  -  05 Dec 2021 11:03  --------------------------------------------------------  IN: 0 mL / OUT: 400 mL / NET: -400 mL        PHYSICAL EXAM:  GENERAL: NAD, well-developed, AOx3  HEAD:  Atraumatic, Normocephalic  EYES: EOMI, PERRL, conjunctiva and sclera clear  NECK: Supple, No JVD  CHEST/LUNG: Clear to auscultation bilaterally  HEART: Regular rate and rhythm; No murmurs, rubs, or gallops, No Edema  ABDOMEN: MARIELLA, incision c/d/i   EXTREMITIES:  2+ Peripheral Pulses, No clubbing, cyanosis  PSYCH: No SI/HI  NEUROLOGY: non-focal  SKIN: No rashes or lesions  Plummer     LABS:                        13.8   11.49 )-----------( 220      ( 05 Dec 2021 08:19 )             44.7     12-05    135  |  98  |  19  ----------------------------<  107<H>  4.2   |  26  |  1.26    Ca    9.0      05 Dec 2021 08:19  Phos  4.0     12-04  Mg     2.10     12-04      PT/INR - ( 05 Dec 2021 08:19 )   PT: 12.9 sec;   INR: 1.13 ratio         PTT - ( 05 Dec 2021 08:19 )  PTT:26.6 sec              RADIOLOGY & ADDITIONAL TESTS:    Imaging Personally Reviewed:    Consultant(s) Notes Reviewed:      Care Discussed with Consultants/Other Providers:

## 2021-12-05 NOTE — DIETITIAN INITIAL EVALUATION ADULT. - OTHER INFO
RD visited with patient for requested consult - unintentional weight loss PTA.  Patient did not indicate weight loss at time of visit. Chart review reflects weight gain - 11/23/21 - 154.2kg; 12/4/21 - 165.6kg.  No edema noted per chart.  No GI distress reported at this time, NPO with sips of water/ice chips at present. Discussed heart healthy diet (h/o HTN) for when diet advances. Patient with h/o pre DM.  No chewing or swallowing difficulties reported. No food allergies reported.    Patient acknowledged he has to make lifestyle changes to help improve overall health, including changing eating habits.

## 2021-12-06 LAB
ANION GAP SERPL CALC-SCNC: 10 MMOL/L — SIGNIFICANT CHANGE UP (ref 7–14)
APTT BLD: 27.5 SEC — SIGNIFICANT CHANGE UP (ref 27–36.3)
BUN SERPL-MCNC: 16 MG/DL — SIGNIFICANT CHANGE UP (ref 7–23)
CALCIUM SERPL-MCNC: 8.8 MG/DL — SIGNIFICANT CHANGE UP (ref 8.4–10.5)
CHLORIDE SERPL-SCNC: 100 MMOL/L — SIGNIFICANT CHANGE UP (ref 98–107)
CO2 SERPL-SCNC: 25 MMOL/L — SIGNIFICANT CHANGE UP (ref 22–31)
CREAT FLD-MCNC: 1.5 MG/DL — SIGNIFICANT CHANGE UP
CREAT SERPL-MCNC: 1.15 MG/DL — SIGNIFICANT CHANGE UP (ref 0.5–1.3)
GLUCOSE SERPL-MCNC: 104 MG/DL — HIGH (ref 70–99)
HCT VFR BLD CALC: 42.7 % — SIGNIFICANT CHANGE UP (ref 39–50)
HGB BLD-MCNC: 13.7 G/DL — SIGNIFICANT CHANGE UP (ref 13–17)
INR BLD: 1.23 RATIO — HIGH (ref 0.88–1.16)
MAGNESIUM SERPL-MCNC: 1.8 MG/DL — SIGNIFICANT CHANGE UP (ref 1.6–2.6)
MCHC RBC-ENTMCNC: 28.1 PG — SIGNIFICANT CHANGE UP (ref 27–34)
MCHC RBC-ENTMCNC: 32.1 GM/DL — SIGNIFICANT CHANGE UP (ref 32–36)
MCV RBC AUTO: 87.5 FL — SIGNIFICANT CHANGE UP (ref 80–100)
NRBC # BLD: 0 /100 WBCS — SIGNIFICANT CHANGE UP
NRBC # FLD: 0 K/UL — SIGNIFICANT CHANGE UP
PHOSPHATE SERPL-MCNC: 2.7 MG/DL — SIGNIFICANT CHANGE UP (ref 2.5–4.5)
PLATELET # BLD AUTO: 228 K/UL — SIGNIFICANT CHANGE UP (ref 150–400)
POTASSIUM SERPL-MCNC: 4 MMOL/L — SIGNIFICANT CHANGE UP (ref 3.5–5.3)
POTASSIUM SERPL-SCNC: 4 MMOL/L — SIGNIFICANT CHANGE UP (ref 3.5–5.3)
PROTHROM AB SERPL-ACNC: 13.9 SEC — HIGH (ref 10.6–13.6)
RBC # BLD: 4.88 M/UL — SIGNIFICANT CHANGE UP (ref 4.2–5.8)
RBC # FLD: 15.9 % — HIGH (ref 10.3–14.5)
SODIUM SERPL-SCNC: 135 MMOL/L — SIGNIFICANT CHANGE UP (ref 135–145)
WBC # BLD: 10.96 K/UL — HIGH (ref 3.8–10.5)
WBC # FLD AUTO: 10.96 K/UL — HIGH (ref 3.8–10.5)

## 2021-12-06 PROCEDURE — 99232 SBSQ HOSP IP/OBS MODERATE 35: CPT

## 2021-12-06 RX ORDER — ACETAMINOPHEN 500 MG
975 TABLET ORAL EVERY 6 HOURS
Refills: 0 | Status: DISCONTINUED | OUTPATIENT
Start: 2021-12-06 | End: 2021-12-07

## 2021-12-06 RX ORDER — LOSARTAN POTASSIUM 100 MG/1
100 TABLET, FILM COATED ORAL DAILY
Refills: 0 | Status: DISCONTINUED | OUTPATIENT
Start: 2021-12-06 | End: 2021-12-07

## 2021-12-06 RX ORDER — MAGNESIUM SULFATE 500 MG/ML
2 VIAL (ML) INJECTION ONCE
Refills: 0 | Status: COMPLETED | OUTPATIENT
Start: 2021-12-06 | End: 2021-12-06

## 2021-12-06 RX ORDER — OXYCODONE HYDROCHLORIDE 5 MG/1
5 TABLET ORAL
Refills: 0 | Status: DISCONTINUED | OUTPATIENT
Start: 2021-12-06 | End: 2021-12-07

## 2021-12-06 RX ORDER — OXYCODONE HYDROCHLORIDE 5 MG/1
10 TABLET ORAL
Refills: 0 | Status: DISCONTINUED | OUTPATIENT
Start: 2021-12-06 | End: 2021-12-07

## 2021-12-06 RX ADMIN — HEPARIN SODIUM 5000 UNIT(S): 5000 INJECTION INTRAVENOUS; SUBCUTANEOUS at 17:16

## 2021-12-06 RX ADMIN — OXYCODONE HYDROCHLORIDE 10 MILLIGRAM(S): 5 TABLET ORAL at 23:14

## 2021-12-06 RX ADMIN — OXYCODONE HYDROCHLORIDE 10 MILLIGRAM(S): 5 TABLET ORAL at 18:13

## 2021-12-06 RX ADMIN — Medication 25 MILLIGRAM(S): at 06:06

## 2021-12-06 RX ADMIN — Medication 100 MILLIGRAM(S): at 22:17

## 2021-12-06 RX ADMIN — LIDOCAINE 1 PATCH: 4 CREAM TOPICAL at 20:39

## 2021-12-06 RX ADMIN — SENNA PLUS 2 TABLET(S): 8.6 TABLET ORAL at 22:16

## 2021-12-06 RX ADMIN — Medication 100 MILLIGRAM(S): at 06:07

## 2021-12-06 RX ADMIN — OXYCODONE HYDROCHLORIDE 10 MILLIGRAM(S): 5 TABLET ORAL at 14:29

## 2021-12-06 RX ADMIN — OXYCODONE HYDROCHLORIDE 10 MILLIGRAM(S): 5 TABLET ORAL at 15:00

## 2021-12-06 RX ADMIN — POLYETHYLENE GLYCOL 3350 17 GRAM(S): 17 POWDER, FOR SOLUTION ORAL at 12:07

## 2021-12-06 RX ADMIN — Medication 1 TABLET(S): at 12:07

## 2021-12-06 RX ADMIN — OXYCODONE HYDROCHLORIDE 10 MILLIGRAM(S): 5 TABLET ORAL at 23:44

## 2021-12-06 RX ADMIN — LIDOCAINE 1 PATCH: 4 CREAM TOPICAL at 17:18

## 2021-12-06 RX ADMIN — Medication 50 GRAM(S): at 11:05

## 2021-12-06 RX ADMIN — Medication 100 MILLIGRAM(S): at 15:03

## 2021-12-06 RX ADMIN — HEPARIN SODIUM 5000 UNIT(S): 5000 INJECTION INTRAVENOUS; SUBCUTANEOUS at 06:05

## 2021-12-06 RX ADMIN — LIDOCAINE 1 PATCH: 4 CREAM TOPICAL at 05:51

## 2021-12-06 RX ADMIN — OXYCODONE HYDROCHLORIDE 10 MILLIGRAM(S): 5 TABLET ORAL at 18:30

## 2021-12-06 RX ADMIN — AMLODIPINE BESYLATE 10 MILLIGRAM(S): 2.5 TABLET ORAL at 06:06

## 2021-12-06 NOTE — PROGRESS NOTE ADULT - SUBJECTIVE AND OBJECTIVE BOX
POD #3  Afeb 158/89 97 93%RA  Pt has no c/o  Abd- soft NT ND; + flatus     wounds C&D  Plummer 750  MARIELLA 30  PCEA

## 2021-12-06 NOTE — PROGRESS NOTE ADULT - ASSESSMENT
51 yrs old male with h/o HTN, prediabetic, s/p left radical nephrectomy, 9/3/21, recent dx of right renal mass admitted for right open partial nephrectomy today. S/P OR, POD#2 51M h/o HTN, prediabetic, s/p left radical nephrectomy, 9/3/21, recent dx of right renal mass admitted for right open partial nephrectomy today. S/P OR, POD#2 with BP not well controlled.

## 2021-12-06 NOTE — PROGRESS NOTE ADULT - PROBLEM SELECTOR PROBLEM 1
Malignant neoplasm of unspecified kidney, except renal pelvis

## 2021-12-06 NOTE — PROGRESS NOTE ADULT - SUBJECTIVE AND OBJECTIVE BOX
CHIEF COMPLAINT: f/u excision of lesion right kidney    SUBJECTIVE / OVERNIGHT EVENTS: Patient seen and examined. No acute events overnight. Pain well controlled and patient without any complaints.    MEDICATIONS  (STANDING):  amLODIPine   Tablet 10 milliGRAM(s) Oral daily  ceFAZolin   IVPB 2000 milliGRAM(s) IV Intermittent every 8 hours  heparin   Injectable 5000 Unit(s) SubCutaneous every 8 hours  lactated ringers. 1000 milliLiter(s) (125 mL/Hr) IV Continuous <Continuous>  lactobacillus acidophilus 1 Tablet(s) Oral daily  lidocaine   4% Patch 1 Patch Transdermal every 24 hours  metoprolol succinate ER 25 milliGRAM(s) Oral daily  polyethylene glycol 3350 17 Gram(s) Oral daily  senna 2 Tablet(s) Oral at bedtime    MEDICATIONS  (PRN):  naloxone Injectable 0.1 milliGRAM(s) IV Push every 3 minutes PRN For ANY of the following changes in patient status:  A. RR LESS THAN 10 breaths per minute, B. Oxygen saturation LESS THAN 90%, C. Sedation score of 6  ondansetron Injectable 4 milliGRAM(s) IV Push every 6 hours PRN Nausea  oxyCODONE    IR 5 milliGRAM(s) Oral every 3 hours PRN Moderate Pain (4 - 6)  oxyCODONE    IR 10 milliGRAM(s) Oral every 3 hours PRN Severe Pain (7 - 10)    VITALS:  T(F): 98.1 (12-06-21 @ 09:52), Max: 98.2 (12-05-21 @ 14:20)  HR: 93 (12-06-21 @ 09:52) (93 - 104)  BP: 150/87 (12-06-21 @ 09:52) (150/87 - 178/95)  RR: 16 (12-06-21 @ 09:52) (16 - 19)  SpO2: 93% (12-06-21 @ 09:52)    PHYSICAL EXAM:  GENERAL: NAD, well-developed, AOx3  HEAD:  Atraumatic, Normocephalic  EYES: EOMI, PERRL, conjunctiva and sclera clear  NECK: Supple, No JVD  CHEST/LUNG: Clear to auscultation bilaterally  HEART: Regular rate and rhythm; No murmurs, rubs, or gallops, No Edema  ABDOMEN: MARIELLA, incision c/d/i   EXTREMITIES:  2+ Peripheral Pulses, No clubbing, cyanosis  PSYCH: No SI/HI  NEUROLOGY: non-focal  SKIN: No rashes or lesions    LABS:              13.7                 135  | 25   | 16           10.96 >-----------< 228     ------------------------< 104                   42.7                 4.0  | 100  | 1.15                                         Ca 8.8   Mg 1.80  Ph 2.7      INR: 1.23 ratio<H>;    PT: 13.9 sec<H>;    PTT: 27.5 sec    [ ] Consultant(s) Notes Reviewed:  [x] Care Discussed with Consultants/Other Providers: Urology PA - discussed post-op care   CHIEF COMPLAINT: f/u excision of lesion right kidney    SUBJECTIVE / OVERNIGHT EVENTS: Patient seen and examined. No acute events overnight. Pain well controlled and patient without any complaints. Passing flatus, tolerating diet and ambulating.    MEDICATIONS  (STANDING):  amLODIPine   Tablet 10 milliGRAM(s) Oral daily  ceFAZolin   IVPB 2000 milliGRAM(s) IV Intermittent every 8 hours  heparin   Injectable 5000 Unit(s) SubCutaneous every 8 hours  lactated ringers. 1000 milliLiter(s) (125 mL/Hr) IV Continuous <Continuous>  lactobacillus acidophilus 1 Tablet(s) Oral daily  lidocaine   4% Patch 1 Patch Transdermal every 24 hours  metoprolol succinate ER 25 milliGRAM(s) Oral daily  polyethylene glycol 3350 17 Gram(s) Oral daily  senna 2 Tablet(s) Oral at bedtime    MEDICATIONS  (PRN):  naloxone Injectable 0.1 milliGRAM(s) IV Push every 3 minutes PRN For ANY of the following changes in patient status:  A. RR LESS THAN 10 breaths per minute, B. Oxygen saturation LESS THAN 90%, C. Sedation score of 6  ondansetron Injectable 4 milliGRAM(s) IV Push every 6 hours PRN Nausea  oxyCODONE    IR 5 milliGRAM(s) Oral every 3 hours PRN Moderate Pain (4 - 6)  oxyCODONE    IR 10 milliGRAM(s) Oral every 3 hours PRN Severe Pain (7 - 10)    VITALS:  T(F): 98.1 (12-06-21 @ 09:52), Max: 98.2 (12-05-21 @ 14:20)  HR: 93 (12-06-21 @ 09:52) (93 - 104)  BP: 150/87 (12-06-21 @ 09:52) (150/87 - 178/95)  RR: 16 (12-06-21 @ 09:52) (16 - 19)  SpO2: 93% (12-06-21 @ 09:52)    PHYSICAL EXAM:  GENERAL: NAD, well-developed, AOx3  HEAD:  Atraumatic, Normocephalic  EYES: EOMI, PERRL, conjunctiva and sclera clear  NECK: Supple, No JVD  CHEST/LUNG: Clear to auscultation bilaterally  HEART: Regular rate and rhythm; No murmurs, rubs, or gallops, No Edema  ABDOMEN: MARIELLA, incision c/d/i   EXTREMITIES:  2+ Peripheral Pulses, No clubbing, cyanosis  PSYCH: No SI/HI  NEUROLOGY: non-focal  SKIN: No rashes or lesions    LABS:              13.7                 135  | 25   | 16           10.96 >-----------< 228     ------------------------< 104                   42.7                 4.0  | 100  | 1.15                                         Ca 8.8   Mg 1.80  Ph 2.7      INR: 1.23 ratio<H>;    PT: 13.9 sec<H>;    PTT: 27.5 sec    [ ] Consultant(s) Notes Reviewed:  [x] Care Discussed with Consultants/Other Providers: Urology PA - discussed post-op care

## 2021-12-06 NOTE — PROGRESS NOTE ADULT - PROBLEM SELECTOR PLAN 2
BP elevated on Losartan at home.  -D/C Losartan in the setting of anticipated VEENA  -Amlodipine 10mg PO daily  was running a bit high still - added toprol xl 25 mg daily - better controlled   -Monitor BP  -Hydralazine PRN for SBP > 180. -BP still not at goal and creatinine back to normal  -c/w amlodipine 10mg PO daily and toprol xl 25 mg daily   -discussed resuming losartan in-house and monitoring  -will decide whether to STOP toprol on discharge when patient ready for discharge -- may benefit with continuing on it.

## 2021-12-06 NOTE — PROGRESS NOTE ADULT - SUBJECTIVE AND OBJECTIVE BOX
Anesthesia Pain Management Service: Day _4_ of Epidural    SUBJECTIVE: Patient doing well with PCEA and no problems. Was noted to have persistent pain overnight despite epidural. Rescue clinician bolus of 5cc given via the epidural and increased epidural rate to 8cc/hour. Currently patient notes improvement of pain. Has started drinking clear liquids      Pain Scale Score: 4/10   Refer to charted pain scores    THERAPY:  [x ] Epidural Bupivacaine 0.0625% and Hydromorphone  		[X ] 10 micrograms/mL	[ ] 5 micrograms/mL  [ ] Epidural Bupivacaine 0.0625% and Fentanyl - 2 micrograms/mL  [ ] Epidural Ropivacaine 0.1% plain – 1 mg/mL  [ ] Patient Controlled Regional Anesthesia (PCRA) Ropivacaine  		[ ] 0.2%			[ ] 0.1%    Demand dose __3_ lockout __15_ (minutes) Continuous Rate _8_ Total: _194_ Daily      MEDICATIONS  (STANDING):  amLODIPine   Tablet 10 milliGRAM(s) Oral daily  ceFAZolin   IVPB 2000 milliGRAM(s) IV Intermittent every 8 hours  heparin   Injectable 5000 Unit(s) SubCutaneous every 8 hours  hydromorphone (10 MICROgram(s)/mL) + bupivacaine 0.0625% in 0.9% Sodium Chloride PCEA 250 milliLiter(s) Epidural PCA Continuous  lactated ringers. 1000 milliLiter(s) (125 mL/Hr) IV Continuous <Continuous>  lactobacillus acidophilus 1 Tablet(s) Oral daily  lidocaine   4% Patch 1 Patch Transdermal every 24 hours  magnesium sulfate  IVPB 2 Gram(s) IV Intermittent once  metoprolol succinate ER 25 milliGRAM(s) Oral daily  polyethylene glycol 3350 17 Gram(s) Oral daily  senna 2 Tablet(s) Oral at bedtime    MEDICATIONS  (PRN):  hydromorphone (10 MICROgram(s)/mL) + bupivacaine 0.0625% in 0.9% Sodium Chloride PCEA Rescue Clinician  Bolus 5 milliLiter(s) Epidural every 15 minutes PRN for Pain Score greater than 6  naloxone Injectable 0.1 milliGRAM(s) IV Push every 3 minutes PRN For ANY of the following changes in patient status:  A. RR LESS THAN 10 breaths per minute, B. Oxygen saturation LESS THAN 90%, C. Sedation score of 6  ondansetron Injectable 4 milliGRAM(s) IV Push every 6 hours PRN Nausea      OBJECTIVE: Pt resting comfortably in chair, A&O x3    Assessment of Catheter Site:	[ ] Left	[ ] Right  [x ] Epidural 	[ ] Femoral	      [ ] Saphenous   [ ] Supraclavicular   [ ] Other:    [x ] Dressing intact	[x ] Site non-tender	[ x] Site without erythema, discharge, edema  [x ] Epidural tubing and connection checked	[x] Gross neurological exam within normal limits  [X ] Catheter removed – tip intact		[ ] Afebrile	  [ ] Febrile: ___       [ X] see Temp under VS below)    PT/INR - ( 06 Dec 2021 06:41 )   PT: 13.9 sec;   INR: 1.23 ratio         PTT - ( 06 Dec 2021 06:41 )  PTT:27.5 sec                      13.7   10.96 )-----------( 228      ( 06 Dec 2021 06:41 )             42.7     Vital Signs Last 24 Hrs  T(C): 36.7 (12-06-21 @ 09:52), Max: 36.8 (12-05-21 @ 14:20)  T(F): 98.1 (12-06-21 @ 09:52), Max: 98.2 (12-05-21 @ 14:20)  HR: 93 (12-06-21 @ 09:52) (93 - 104)  BP: 150/87 (12-06-21 @ 09:52) (150/87 - 178/95)  BP(mean): --  RR: 16 (12-06-21 @ 09:52) (16 - 19)  SpO2: 93% (12-06-21 @ 09:52) (92% - 96%)      Sedation Score:	[x ] Alert	[ ] Drowsy	[ ] Arousable	[ ] Asleep	[ ] Unresponsive    Side Effects:	[x ] None	[ ] Nausea	[ ] Vomiting	[ ] Pruritus  		[ ] Weakness		[ ] Numbness	[ ] Other:    ASSESSMENT/ PLAN:    Therapy to  be:	[ ] Continue   [ X] Discontinued   [ X] Change to prn Analgesics    Documentation and Verification of current medications:  [ X ] Done	[ ] Not done, not eligible, reason:    Comments: PCEA to be discontinued as per primary team request. Changed to IV/oral opioid and/or non-opioid Adjuvant analgesics to be used at this point.    Progress Note written now but Patient was seen earlier.

## 2021-12-06 NOTE — PROGRESS NOTE ADULT - ASSESSMENT
50 yo M s/p open right partial nephrectomy 12/3/2021    12/4 - pain controlled, mild nausea resolved, no vomiting, no flatus yet, not OOB yet, per medicine losartan held, amlodipine started yesterday  12/5 - moderate pain, without nausea or vomiting, no flatus yet  12/6 - passing gas; PCEA; OOB  Plan:  -AM CBC, BMP  -continue IVF  -d/c shukla  -d/c PCEA,   -continue Ancef  -f/u medicine, pain, PT  -DVT prophy, IS, OOB, ambulate

## 2021-12-06 NOTE — PROGRESS NOTE ADULT - PROBLEM SELECTOR PLAN 3
Creat 1.32 preop. Anticipate elevated creat due to partial nephrectomy in a pt with solitary kidney  -Monitor urine output  - renal fx back to baseline Creat 1.32 preop. Anticipate elevated creat due to partial nephrectomy in a pt with solitary kidney  -Monitor urine output  -renal fx back to baseline with creatinine at 1.15

## 2021-12-06 NOTE — PROGRESS NOTE ADULT - PROBLEM SELECTOR PLAN 1
S/P right partial nephrectomy, POD#1  -Management as per   -Pain control - on pca  -Bowel regimen - no bm yet, monitor   -Incentive spirometer  -OOB and ambulate  -DVT prophylaxis.  - diet per   - ppx abx per  - trend wbc S/P right partial nephrectomy, POD#2  -Management as per   -Pain control with oxycodone IR prn  -Incentive spirometer  -OOB and ambulate  -DVT prophylaxis.  -diet per   -ppx abx per  - trend wbc

## 2021-12-07 ENCOUNTER — TRANSCRIPTION ENCOUNTER (OUTPATIENT)
Age: 51
End: 2021-12-07

## 2021-12-07 VITALS
RESPIRATION RATE: 16 BRPM | DIASTOLIC BLOOD PRESSURE: 80 MMHG | SYSTOLIC BLOOD PRESSURE: 142 MMHG | OXYGEN SATURATION: 93 % | TEMPERATURE: 98 F | HEART RATE: 97 BPM

## 2021-12-07 RX ORDER — OXYCODONE HYDROCHLORIDE 5 MG/1
1 TABLET ORAL
Qty: 16 | Refills: 0
Start: 2021-12-07 | End: 2021-12-10

## 2021-12-07 RX ORDER — AMLODIPINE BESYLATE 2.5 MG/1
1 TABLET ORAL
Qty: 30 | Refills: 0
Start: 2021-12-07 | End: 2022-01-05

## 2021-12-07 RX ORDER — METOPROLOL TARTRATE 50 MG
1 TABLET ORAL
Qty: 0 | Refills: 0 | DISCHARGE
Start: 2021-12-07

## 2021-12-07 RX ORDER — AMLODIPINE BESYLATE 2.5 MG/1
1 TABLET ORAL
Qty: 0 | Refills: 0 | DISCHARGE
Start: 2021-12-07

## 2021-12-07 RX ORDER — CEPHALEXIN 500 MG
1 CAPSULE ORAL
Qty: 10 | Refills: 0
Start: 2021-12-07 | End: 2021-12-11

## 2021-12-07 RX ORDER — ACETAMINOPHEN 500 MG
3 TABLET ORAL
Qty: 0 | Refills: 0 | DISCHARGE
Start: 2021-12-07

## 2021-12-07 RX ORDER — METOPROLOL TARTRATE 50 MG
1 TABLET ORAL
Qty: 30 | Refills: 0
Start: 2021-12-07 | End: 2022-01-05

## 2021-12-07 RX ADMIN — OXYCODONE HYDROCHLORIDE 10 MILLIGRAM(S): 5 TABLET ORAL at 09:05

## 2021-12-07 RX ADMIN — HEPARIN SODIUM 5000 UNIT(S): 5000 INJECTION INTRAVENOUS; SUBCUTANEOUS at 06:05

## 2021-12-07 RX ADMIN — Medication 100 MILLIGRAM(S): at 06:06

## 2021-12-07 RX ADMIN — LOSARTAN POTASSIUM 100 MILLIGRAM(S): 100 TABLET, FILM COATED ORAL at 06:09

## 2021-12-07 RX ADMIN — Medication 25 MILLIGRAM(S): at 06:07

## 2021-12-07 RX ADMIN — AMLODIPINE BESYLATE 10 MILLIGRAM(S): 2.5 TABLET ORAL at 06:06

## 2021-12-07 RX ADMIN — OXYCODONE HYDROCHLORIDE 10 MILLIGRAM(S): 5 TABLET ORAL at 08:35

## 2021-12-07 NOTE — DISCHARGE NOTE NURSING/CASE MANAGEMENT/SOCIAL WORK - NSDCPEFALRISK_GEN_ALL_CORE
For information on Fall & Injury Prevention, visit: [email or web address] OR  [email or web address] OR  https://www.cdc.gov/steadi/patient.html

## 2021-12-07 NOTE — DISCHARGE NOTE PROVIDER - CARE PROVIDER_API CALL
Pauline Orona)  Urology  [address] [place] [address], [place] [address]  Phone: [phone]  Fax: [phone]  Follow Up Time:

## 2021-12-07 NOTE — PROGRESS NOTE ADULT - ASSESSMENT
50 yo M s/p open right partial nephrectomy 12/3/2021    12/4 - pain controlled, mild nausea resolved, no vomiting, no flatus yet, not OOB yet, per medicine losartan held, amlodipine started yesterday  12/5 - moderate pain, without nausea or vomiting, no flatus yet  12/6 - passing gas; PCEA; OOB; osvaldo reg diet  12/7 -no events  Plan:  - home today  - d/c MARIELLA

## 2021-12-07 NOTE — DISCHARGE NOTE PROVIDER - HOSPITAL COURSE
Pt had open R. partial nephrectomy 5 days ago; did well; ambulating; pain is controlled; PCEA removed yesterday; labs stable; MARIELLA removed; osvaldo reg diet; home today on keflex

## 2021-12-07 NOTE — DISCHARGE NOTE NURSING/CASE MANAGEMENT/SOCIAL WORK - NSDCPNINST_GEN_ALL_CORE
Notify Filomena if you experience any increase in pain not relieved with medication, any redness, drainage or swelling around incision, any persistent nausea vomiting, any bright red blood or clots in urine or if you develop a fever >100.5.  Drink plenty of fluids.  No heavy lifting or straining.  Use over the counter stool softeners to assist with constipation. Notify Filomena if you experience any increase in pain not relieved with medication, any redness, drainage or swelling around incision, any persistent nausea vomiting, any bright red blood or clots in urine or if you develop a fever >100.5.  Drink plenty of fluids.  No heavy lifting or straining.  Use over the counter stool softeners to assist with constipation which can be a side effect of narcotic pain medication.

## 2021-12-07 NOTE — DISCHARGE NOTE NURSING/CASE MANAGEMENT/SOCIAL WORK - PATIENT PORTAL LINK FT
You can access the FollowMyHealth Patient Portal offered by [facility] by registering at the following website: [email or web address]. By joining [facility]’s FollowMyHealth portal, you will also be able to view your health information using other applications (apps) compatible with our system.

## 2021-12-07 NOTE — DISCHARGE NOTE PROVIDER - NSDCCPCAREPLAN_GEN_ALL_CORE_FT
PRINCIPAL DISCHARGE DIAGNOSIS  Diagnosis: Malignant neoplasm of unspecified kidney, except renal pelvis  Assessment and Plan of Treatment: Drink plenty of fluids.  No heavy lifting (greater than 10 pounds) or straining for 4 to 6 weeks.  You may shower, just pat white strips dry, they will fall off in a few weeks. Change dressing at drain site daily or as needed until dry. Do not drive when taking pain medication.  Call 's  office  to schedule a follow up appointment.  Call the office if you have fever greater than 101, difficulty urinating, pain not relieved with pain medication, nausea/vomiting..         PRINCIPAL DISCHARGE DIAGNOSIS  Diagnosis: Malignant neoplasm of unspecified kidney, except renal pelvis  Assessment and Plan of Treatment: Drink plenty of fluids.  No heavy lifting (greater than 10 pounds) or straining for 4 to 6 weeks.  You may shower, just pat white strips dry, they will fall off in a few weeks. Change dressing at drain site daily or as needed until dry. Do not drive when taking pain medication.  Call 's  office  to schedule a follow up appointment.  Follow up with your PCP about your blood pressure and the 2 new drugs we put you on.  Call the office if you have fever greater than 101, difficulty urinating, pain not relieved with pain medication, nausea/vomiting..

## 2021-12-07 NOTE — PROGRESS NOTE ADULT - PROVIDER SPECIALTY LIST ADULT
Pain Medicine
Urology
Hospitalist

## 2021-12-07 NOTE — PROGRESS NOTE ADULT - SUBJECTIVE AND OBJECTIVE BOX
POD #4  Afeb 137/89 93 93%RA    Pt has no c/o  Abd- soft NT ND; + flatus     wounds C&D  Void 1L  MARIELLA 10

## 2021-12-07 NOTE — DISCHARGE NOTE NURSING/CASE MANAGEMENT/SOCIAL WORK - NSDPDISTO_GEN_ALL_CORE
Home stable, tolerating diet, voiding adequately, DSD to d/cd drain site intact, incisional steris intact, tolerating oob/Home

## 2021-12-07 NOTE — DISCHARGE NOTE PROVIDER - NSDCMRMEDTOKEN_GEN_ALL_CORE_FT
acetaminophen 325 mg oral tablet: 3 tab(s) orally every 6 hours, As needed, Moderate Pain (4 - 6)  amLODIPine 10 mg oral tablet: 1 tab(s) orally once a day  cephalexin 500 mg oral tablet: 1 tab(s) orally 2 times a day   losartan 100 mg oral tablet: 1 tab(s) orally once a day  metoprolol succinate 25 mg oral tablet, extended release: 1 tab(s) orally once a day  oxyCODONE 5 mg oral tablet: 1 tab(s) orally every 6 hours, As Needed -Moderate Pain (4 - 6) MDD:4   acetaminophen 325 mg oral tablet: 3 tab(s) orally every 6 hours, As needed, Moderate Pain (4 - 6)  cephalexin 500 mg oral tablet: 1 tab(s) orally 2 times a day   losartan 100 mg oral tablet: 1 tab(s) orally once a day  oxyCODONE 5 mg oral tablet: 1 tab(s) orally every 6 hours, As Needed -Moderate Pain (4 - 6) MDD:4   acetaminophen 325 mg oral tablet: 3 tab(s) orally every 6 hours, As needed, Moderate Pain (4 - 6)  amLODIPine 10 mg oral tablet: 1 tab(s) orally once a day   cephalexin 500 mg oral tablet: 1 tab(s) orally 2 times a day   losartan 100 mg oral tablet: 1 tab(s) orally once a day  oxyCODONE 5 mg oral tablet: 1 tab(s) orally every 6 hours, As Needed -Moderate Pain (4 - 6) MDD:4  Toprol-XL 25 mg oral tablet, extended release: 1 tab(s) orally once a day

## 2021-12-08 PROBLEM — C64.9 MALIGNANT NEOPLASM OF UNSPECIFIED KIDNEY, EXCEPT RENAL PELVIS: Chronic | Status: ACTIVE | Noted: 2021-11-23

## 2021-12-08 PROBLEM — F17.210 NICOTINE DEPENDENCE, CIGARETTES, UNCOMPLICATED: Chronic | Status: ACTIVE | Noted: 2021-08-20

## 2021-12-08 LAB — SURGICAL PATHOLOGY STUDY: SIGNIFICANT CHANGE UP

## 2021-12-09 DIAGNOSIS — Z71.89 OTHER SPECIFIED COUNSELING: ICD-10-CM

## 2021-12-15 ENCOUNTER — APPOINTMENT (OUTPATIENT)
Dept: UROLOGY | Facility: CLINIC | Age: 51
End: 2021-12-15
Payer: MEDICAID

## 2021-12-15 PROCEDURE — 99024 POSTOP FOLLOW-UP VISIT: CPT

## 2022-01-01 NOTE — PATIENT PROFILE ADULT - NSPROPASSIVESMOKEEXPOSURE_GEN_A_NUR
Problem: Respiratory Impairment - Respiratory Therapy 253  Intervention: Respiratory support devices  Note: Intervention Status  Done      No

## 2022-03-16 ENCOUNTER — APPOINTMENT (OUTPATIENT)
Dept: UROLOGY | Facility: CLINIC | Age: 52
End: 2022-03-16
Payer: MEDICAID

## 2022-03-16 PROCEDURE — 99214 OFFICE O/P EST MOD 30 MIN: CPT

## 2022-03-17 LAB
ANION GAP SERPL CALC-SCNC: 14 MMOL/L
BUN SERPL-MCNC: 16 MG/DL
CALCIUM SERPL-MCNC: 9.5 MG/DL
CHLORIDE SERPL-SCNC: 97 MMOL/L
CO2 SERPL-SCNC: 28 MMOL/L
CREAT SERPL-MCNC: 1.36 MG/DL
EGFR: 63 ML/MIN/1.73M2
GLUCOSE SERPL-MCNC: 120 MG/DL
POTASSIUM SERPL-SCNC: 4.7 MMOL/L
SODIUM SERPL-SCNC: 139 MMOL/L

## 2022-05-24 ENCOUNTER — NON-APPOINTMENT (OUTPATIENT)
Age: 52
End: 2022-05-24

## 2022-06-22 ENCOUNTER — APPOINTMENT (OUTPATIENT)
Dept: CT IMAGING | Facility: IMAGING CENTER | Age: 52
End: 2022-06-22
Payer: MEDICAID

## 2022-06-22 ENCOUNTER — OUTPATIENT (OUTPATIENT)
Dept: OUTPATIENT SERVICES | Facility: HOSPITAL | Age: 52
LOS: 1 days | End: 2022-06-22
Payer: MEDICAID

## 2022-06-22 DIAGNOSIS — Z90.5 ACQUIRED ABSENCE OF KIDNEY: Chronic | ICD-10-CM

## 2022-06-22 DIAGNOSIS — Z00.8 ENCOUNTER FOR OTHER GENERAL EXAMINATION: ICD-10-CM

## 2022-06-22 DIAGNOSIS — Z98.890 OTHER SPECIFIED POSTPROCEDURAL STATES: Chronic | ICD-10-CM

## 2022-06-22 DIAGNOSIS — C64.9 MALIGNANT NEOPLASM OF UNSPECIFIED KIDNEY, EXCEPT RENAL PELVIS: ICD-10-CM

## 2022-06-22 PROCEDURE — 74178 CT ABD&PLV WO CNTR FLWD CNTR: CPT

## 2022-06-22 PROCEDURE — 82565 ASSAY OF CREATININE: CPT

## 2022-06-22 PROCEDURE — 74178 CT ABD&PLV WO CNTR FLWD CNTR: CPT | Mod: 26

## 2022-09-22 ENCOUNTER — APPOINTMENT (OUTPATIENT)
Dept: UROLOGY | Facility: CLINIC | Age: 52
End: 2022-09-22

## 2022-09-22 DIAGNOSIS — R35.0 FREQUENCY OF MICTURITION: ICD-10-CM

## 2022-09-22 PROCEDURE — 99214 OFFICE O/P EST MOD 30 MIN: CPT

## 2022-09-23 LAB
ANION GAP SERPL CALC-SCNC: 13 MMOL/L
APPEARANCE: CLEAR
BACTERIA: NEGATIVE
BILIRUBIN URINE: NEGATIVE
BLOOD URINE: NEGATIVE
BUN SERPL-MCNC: 16 MG/DL
CALCIUM SERPL-MCNC: 9.3 MG/DL
CHLORIDE SERPL-SCNC: 96 MMOL/L
CO2 SERPL-SCNC: 29 MMOL/L
COLOR: NORMAL
CREAT SERPL-MCNC: 1.29 MG/DL
EGFR: 66 ML/MIN/1.73M2
GLUCOSE QUALITATIVE U: NEGATIVE
GLUCOSE SERPL-MCNC: 134 MG/DL
HYALINE CASTS: 0 /LPF
KETONES URINE: NEGATIVE
LEUKOCYTE ESTERASE URINE: NEGATIVE
MICROSCOPIC-UA: NORMAL
NITRITE URINE: NEGATIVE
PH URINE: 6.5
POTASSIUM SERPL-SCNC: 4.4 MMOL/L
PROTEIN URINE: NORMAL
PSA FREE FLD-MCNC: 25 %
PSA FREE SERPL-MCNC: 0.29 NG/ML
PSA SERPL-MCNC: 1.15 NG/ML
RED BLOOD CELLS URINE: 0 /HPF
SODIUM SERPL-SCNC: 139 MMOL/L
SPECIFIC GRAVITY URINE: 1.01
SQUAMOUS EPITHELIAL CELLS: 0 /HPF
UROBILINOGEN URINE: NORMAL
WHITE BLOOD CELLS URINE: 0 /HPF

## 2022-09-25 NOTE — H&P PST ADULT - NEGATIVE ALLERGY TYPES
You may take Tylenol (acetaminophen)  1000 mg every 8 hours for aches, pains, and headaches. The maximum dose of Tylenol in a 24-hour period is 3000 mg.    Call [phone] to schedule a physician's appointment.  This number will help you make an appointment for the referral that has been placed for you.   no outdoor environmental allergies/no indoor environmental allergies

## 2022-10-07 ENCOUNTER — APPOINTMENT (OUTPATIENT)
Dept: PULMONOLOGY | Facility: CLINIC | Age: 52
End: 2022-10-07

## 2022-10-07 VITALS
TEMPERATURE: 97 F | RESPIRATION RATE: 17 BRPM | HEIGHT: 74 IN | WEIGHT: 315 LBS | HEART RATE: 104 BPM | BODY MASS INDEX: 40.43 KG/M2 | SYSTOLIC BLOOD PRESSURE: 150 MMHG | DIASTOLIC BLOOD PRESSURE: 80 MMHG | OXYGEN SATURATION: 93 %

## 2022-10-07 DIAGNOSIS — Z85.528 PERSONAL HISTORY OF OTHER MALIGNANT NEOPLASM OF KIDNEY: ICD-10-CM

## 2022-10-07 DIAGNOSIS — Z87.891 PERSONAL HISTORY OF NICOTINE DEPENDENCE: ICD-10-CM

## 2022-10-07 DIAGNOSIS — Z87.438 PERSONAL HISTORY OF OTHER DISEASES OF MALE GENITAL ORGANS: ICD-10-CM

## 2022-10-07 DIAGNOSIS — Z82.5 FAMILY HISTORY OF ASTHMA AND OTHER CHRONIC LOWER RESPIRATORY DISEASES: ICD-10-CM

## 2022-10-07 DIAGNOSIS — Z80.6 FAMILY HISTORY OF LEUKEMIA: ICD-10-CM

## 2022-10-07 DIAGNOSIS — Z86.79 PERSONAL HISTORY OF OTHER DISEASES OF THE CIRCULATORY SYSTEM: ICD-10-CM

## 2022-10-07 DIAGNOSIS — Z87.19 PERSONAL HISTORY OF OTHER DISEASES OF THE DIGESTIVE SYSTEM: ICD-10-CM

## 2022-10-07 DIAGNOSIS — Z12.2 ENCOUNTER FOR SCREENING FOR MALIGNANT NEOPLASM OF RESPIRATORY ORGANS: ICD-10-CM

## 2022-10-07 DIAGNOSIS — E66.3 OVERWEIGHT: ICD-10-CM

## 2022-10-07 PROCEDURE — 94010 BREATHING CAPACITY TEST: CPT

## 2022-10-07 PROCEDURE — 71046 X-RAY EXAM CHEST 2 VIEWS: CPT

## 2022-10-07 PROCEDURE — 99406 BEHAV CHNG SMOKING 3-10 MIN: CPT | Mod: 25

## 2022-10-07 PROCEDURE — ZZZZZ: CPT

## 2022-10-07 PROCEDURE — 94727 GAS DIL/WSHOT DETER LNG VOL: CPT

## 2022-10-07 PROCEDURE — 95012 NITRIC OXIDE EXP GAS DETER: CPT

## 2022-10-07 PROCEDURE — G0296 VISIT TO DETERM LDCT ELIG: CPT

## 2022-10-07 PROCEDURE — 94729 DIFFUSING CAPACITY: CPT

## 2022-10-07 PROCEDURE — 99204 OFFICE O/P NEW MOD 45 MIN: CPT | Mod: 25

## 2022-10-07 RX ORDER — HYDROCODONE BITARTRATE AND ACETAMINOPHEN 5; 325 MG/1; MG/1
5-325 TABLET ORAL
Qty: 40 | Refills: 0 | Status: DISCONTINUED | COMMUNITY
Start: 2021-12-08 | End: 2022-10-07

## 2022-10-07 RX ORDER — LOSARTAN POTASSIUM 25 MG/1
25 TABLET, FILM COATED ORAL
Refills: 0 | Status: ACTIVE | COMMUNITY

## 2022-10-07 RX ORDER — OXYCODONE AND ACETAMINOPHEN 5; 325 MG/1; MG/1
5-325 TABLET ORAL
Qty: 40 | Refills: 0 | Status: DISCONTINUED | COMMUNITY
Start: 2021-09-13 | End: 2022-10-07

## 2022-10-07 RX ORDER — OXYCODONE AND ACETAMINOPHEN 5; 325 MG/1; MG/1
5-325 TABLET ORAL
Qty: 40 | Refills: 0 | Status: DISCONTINUED | COMMUNITY
Start: 2021-12-15 | End: 2022-10-07

## 2022-10-07 NOTE — PHYSICAL EXAM
[No Acute Distress] : no acute distress [Normal Oropharynx] : normal oropharynx [Normal Appearance] : normal appearance [No Neck Mass] : no neck mass [Normal Rate/Rhythm] : normal rate/rhythm [Normal S1, S2] : normal s1, s2 [No Murmurs] : no murmurs [No Resp Distress] : no resp distress [Clear to Auscultation Bilaterally] : clear to auscultation bilaterally [No Abnormalities] : no abnormalities [Benign] : benign [Normal Gait] : normal gait [No Clubbing] : no clubbing [No Cyanosis] : no cyanosis [FROM] : FROM [Normal Color/ Pigmentation] : normal color/ pigmentation [No Focal Deficits] : no focal deficits [Oriented x3] : oriented x3 [Normal Affect] : normal affect [III] : Mallampati Class: III [TextBox_2] : OW [TextBox_68] : I:E 1:3, clear  [TextBox_105] : 2+ LE Edema

## 2022-10-07 NOTE — PROCEDURE
[FreeTextEntry1] : CXR revealed a normal sized heart; there was no evidence of definitive infiltrate or effusion\par \par Echo (8/31/3021) revealed stress EKG is negative for ischemia. Stress echo is negative for ischemia. Blood pressure was hypertensive. Functional capacity below average for age.\par \par Patient completed a 6-minute walk study in which the Lowest Pulse Ox was  90%; there was no evidence of dyspnea or fatigue. The patient walked 358.6 meters \par \par FENO was 12; a normal value being less than 25\par Fractional exhaled nitric oxide (FENO) is regarded as a simple, noninvasive method for assessing eosinophilic airway inflammation. Produced by a variety of cells within the lung, nitric oxide (NO) concentrations are generally low in healthy individuals. However, high concentrations of NO appear to be involved in nonspecific host defense mechanisms and chronic inflammatory diseases such as asthma. The American Thoracic Society (ATS) therefore has recommended using FENO to aid in the diagnosis and monitoring of eosinophilic airway inflammation and asthma, and for identifying steroid responsive individuals whose chronic respiratory symptoms may be caused by airway inflammation. \par \par Full PFT- spi reveals moderate restriction and severe obstruction; FEV1 was 1.15L which is 26% of predicted; mild low lung volumes; mild low diffusion at 22.4, which is 62% of predicted; normal flow volume loop

## 2022-10-07 NOTE — ASSESSMENT
[FreeTextEntry1] : Mr. MARIA is a 52 year year old male coming into the office today for an initial evaluation with a prior history of hypernephroma of the left kidney s/p left nephrectomy and partial nephrectomy on the right, bilateral renal cell carcinoma, high blood pressure, BPH , GERD, overweight, nicotine addiction who now comes in for a pulmonary evaluation for SOB, COPD, GERD, likely SANDIE, ?pulmonary embolism (doubtful based on recent echo).\par \par The patient's SOB is felt to be multifactorial:\par - Overweight\par - Out-of Shape\par - Poor breathing mechanics \par - Pulmonary\par    - Combined restrictive and obstructive dysfunction \par    - ?PE/ CHF \par - Cardiac \par    - Dr. De Guzman \par \par Problem 1: Restrictive Dysfunction \par - Due to weight \par - Recommended weight loss\par - Recommended weight loss coaches Felix and Aamir \par \par Problem 2: Obstructive Dysfunction \par - former smoking hx; likely COPD \par - add Trelegy 200 1 inhalation QD \par \par Problem 3: Likely COPD\par - COPD is a progressive disease and although it can’t be cured , appropriate management can slow its progression, reduce frequency and severity of exacerbations, and improve symptoms and the patient quality of life. Hospitalizations are the greatest contributor to the total COPD costs and account for up to 87% of total COPD related costs. Exacerbations are the main cause of admissions and subsequently account for the 40-75% of COPD costs. Inhaled maintenance therapy reduces the incidence of exacerbations in patients with stable COPD. Incorrect inhaler use and nonadherence are major obstacles to achieving COPD treatment goals. Many COPD patients have challenges (impaired inhalation, limited dexterity, reduced cognition: that limit their ability to correctly use their COPD treatment devices resulting in reduced symptom control. Of most importance is smoking cessation and early intervention with respiratory illnesses and contemplation for pulmonary rehab to enhance quality of life. \par \par Problem 4: ?PE/ CHF \par - Check BMP and D-Dimer \par - Repeat Echo cardiogram \par \par Problem 5: GERD\par -off pantoprazole and add Pepcid 40 mg QHS \par -Rule of 2s: avoid eating too much, eating too late, eating too spicy, eating two hours before bed.\par - Things to avoid including overeating, spicy foods, tight clothing, eating within two hours of bed, this list is not all inclusive.\par - For treatments of reflux, possible options discussed including diet control, H2 blockers, PPIs, as well as coating motility agents discussed as treatment options. Timing of meals and proximity of last meal to sleep were discussed. If symptoms persist, a formal gastrointestinal evaluation is needed. \par \par Problem 6: ?Anemia\par - Get Iron studies \par \par Problem 7: Poor Sleep; likely SANDIE \par - Get home sleep study \par - Sleep apnea is associated with adverse clinical consequences which an affect most organ systems. Cardiovascular disease risk includes arrhythmias, atrial fibrillation, hypertension, coronary artery disease, and stroke. Metabolic disorders include diabetes type 2, non-alcoholic fatty liver disease. Mood disorder especially depression; and cognitive decline especially in the elderly. Associations with chronic reflux/Pandya’s esophagus some but not all inclusive. \par -Reasons include arousal consistent with hypopnea; respiratory events most prominent in REM sleep or supine position; therefore sleep staging and body position are important for accurate diagnosis and estimation of AHI. \par \par Problem 8: Lung Cancer Screening (10/7/2022)\par - All patients with the diagnosis of lung cancer regardless of stage will need to see an oncologist for evaluation as the treatments/prophylaxis is forever changing/evolving. You may also need a radiation oncology evaluation for potential therapy - to be decided by an oncologist, pulmonologist, or thoracic surgeon based on the extent of the disease. \par \par Problem 9: Nicotine addiction (Discussed 10/7/2022) \par - Discussed for ten minutes with the patient the risks/associations with continued smoking including COPD, emphysema, shortness of breath, renal cancer, bladder cancer, stroke risk, cardiac disease, etc. Smoking cessation was discussed at length and highly encouraged. Various options to aid cessation was discussed including use of Chantix, Nicotrol, nicotine products, laser therapy, hypnosis, Wellbutrin, etc. \par \par Problem 10: COVID-19 Education\par - Recommended SaNotize \par \par Problem 11: Cardiac (Dr. De Guzman) \par -Recommended cardiac follow up evaluation with cardiologist \par \par Problem 12: Overweight/out of shape\par - Recommended weight loss  Rossana \par - Weight loss, exercise, and diet control were discussed and are highly encouraged. Treatment options were given such as, aqua therapy, and contacting a nutritionist. Recommended to use the elliptical, stationary bike, less use of treadmill. Mindful eating was explained to the patient Obesity is associated with worsening asthma, shortness of breath, and potential for cardiac disease, diabetes, and other underlying medical conditions\par \par Problem 13: Poor Breathing Mechanics\par - Recommended Wim Hof and Buteyko breathing techniques \par - Proper breathing techniques were reviewed with an emphasis of exhalation. Patient instructed to breath in for 1 second and out for four seconds. Patient was encouraged to not talk while walking.\par \par Problem 14: Health maintenance\par -s/p flu shot\par -recommended strep pneumonia vaccines: Prevnar-20 vaccine, followed by Pneumo vaccine 23 one year following\par -recommended early intervention for URIs\par -recommended regular osteoporosis evaluations-recommended early dermatological evaluations\par -recommended after the age of 50 to the age of 70, colonoscopy every 5 years\par \par \par Follow up in 3-4 months\par pt is encouraged to call or fax the office with any questions or concerns.\par -Education provided to the PT regarding their visit and conditions.

## 2022-10-07 NOTE — HISTORY OF PRESENT ILLNESS
[TextBox_4] : Mr. MARIA is a 52 year old male presenting to the office today for initial pulmonary evaluation. His chief complaint is-\par - he notes feeling SOB \par - he notes having trouble breathing \par - he notes when bending over he cannot catch his breathe \par - he notes after his surgery he felt having a lot of gas and acid reflux which he never had before \par - he notes weight is stable\par - he denies any visual issues\par - he notes sense of smell and taste are okay \par - his wife notes he used to snore a lot but now has not been \par - he notes not sleeping \par - he notes not being able to lay down since surgery \par - he denies leg pain \par - he notes sinus are quiet\par - he denies PND \par - He reports not being able to fall asleep as a passenger in a car for over an hour\par - He reports being able to fall asleep while watching a boring TV show \par - he notes memory is okay, concentration could be better \par - he notes waking up at night to urinate once-twice \par - he notes since surgery, after he exercise or walks a flight of stair he gets SOB and has to go to urinate right away \par - he notes smoking pack a day \par \par he denies any headaches, nausea, vomiting, fever, chills, sweats, chest pain, chest pressure, diarrhea, constipation, dysphagia, dizziness, leg swelling, leg pain, itchy eyes, itchy ears, sour taste in the mouth, myalgias or arthralgias.

## 2022-10-07 NOTE — ADDENDUM
[FreeTextEntry1] : Documented by Charlie Osborn acting as a scribe for Dr. Willie Patterson on (10/07/2022).\par \par All medical record entries made by the Scribe were at my, Dr. Willie Patterson's, direction and personally dictated by me on (10/07/2022). I have reviewed the chart and agree that the record accurately reflects my personal performance of the history, physical exam, assessment and plan. I have personally directed, reviewed, and agree with the discharge instructions.

## 2022-10-13 LAB
FERRITIN SERPL-MCNC: 70 NG/ML
IRON SATN MFR SERPL: 12 %
IRON SERPL-MCNC: 43 UG/DL
TIBC SERPL-MCNC: 362 UG/DL
UIBC SERPL-MCNC: 319 UG/DL

## 2022-10-14 LAB — DEPRECATED D DIMER PPP IA-ACNC: <150 NG/ML DDU

## 2022-10-18 ENCOUNTER — NON-APPOINTMENT (OUTPATIENT)
Age: 52
End: 2022-10-18

## 2022-10-24 VITALS — HEIGHT: 74 IN | BODY MASS INDEX: 40.43 KG/M2 | WEIGHT: 315 LBS

## 2022-10-24 NOTE — HISTORY OF PRESENT ILLNESS
[Current] : Current [TextBox_13] : Patient is scheduled for a baseline LDCT for lung cancer screening. Shared decision making managed and documented by Dr. Patterson. Attempts to reach patient unsuccessful. Chart review performed to confirm eligibility for LDCT. \par \par  No documented personal or family history of lung cancer. No documented s/s of lung cancer. Patient is a current smoker with a 30 pack year history.\par  [PacksperDay] : 1 [N_Years] : 30 [PacksperYear] : 30

## 2022-10-26 ENCOUNTER — APPOINTMENT (OUTPATIENT)
Dept: CT IMAGING | Facility: IMAGING CENTER | Age: 52
End: 2022-10-26

## 2022-10-26 ENCOUNTER — OUTPATIENT (OUTPATIENT)
Dept: OUTPATIENT SERVICES | Facility: HOSPITAL | Age: 52
LOS: 1 days | End: 2022-10-26
Payer: MEDICAID

## 2022-10-26 DIAGNOSIS — Z90.5 ACQUIRED ABSENCE OF KIDNEY: Chronic | ICD-10-CM

## 2022-10-26 DIAGNOSIS — Z00.8 ENCOUNTER FOR OTHER GENERAL EXAMINATION: ICD-10-CM

## 2022-10-26 DIAGNOSIS — Z98.890 OTHER SPECIFIED POSTPROCEDURAL STATES: Chronic | ICD-10-CM

## 2022-10-26 DIAGNOSIS — Z12.2 ENCOUNTER FOR SCREENING FOR MALIGNANT NEOPLASM OF RESPIRATORY ORGANS: ICD-10-CM

## 2022-10-26 PROCEDURE — 71271 CT THORAX LUNG CANCER SCR C-: CPT | Mod: 26

## 2022-10-26 PROCEDURE — 71271 CT THORAX LUNG CANCER SCR C-: CPT

## 2022-11-01 ENCOUNTER — APPOINTMENT (OUTPATIENT)
Dept: INTERNAL MEDICINE | Facility: CLINIC | Age: 52
End: 2022-11-01

## 2022-11-02 ENCOUNTER — NON-APPOINTMENT (OUTPATIENT)
Age: 52
End: 2022-11-02

## 2022-11-18 ENCOUNTER — APPOINTMENT (OUTPATIENT)
Dept: SLEEP CENTER | Facility: CLINIC | Age: 52
End: 2022-11-18

## 2022-11-18 ENCOUNTER — OUTPATIENT (OUTPATIENT)
Dept: OUTPATIENT SERVICES | Facility: HOSPITAL | Age: 52
LOS: 1 days | End: 2022-11-18
Payer: MEDICAID

## 2022-11-18 DIAGNOSIS — Z98.890 OTHER SPECIFIED POSTPROCEDURAL STATES: Chronic | ICD-10-CM

## 2022-11-18 DIAGNOSIS — Z90.5 ACQUIRED ABSENCE OF KIDNEY: Chronic | ICD-10-CM

## 2022-11-18 PROCEDURE — 95800 SLP STDY UNATTENDED: CPT | Mod: 26

## 2022-11-18 PROCEDURE — 95800 SLP STDY UNATTENDED: CPT

## 2022-12-01 ENCOUNTER — NON-APPOINTMENT (OUTPATIENT)
Age: 52
End: 2022-12-01

## 2022-12-05 ENCOUNTER — RX RENEWAL (OUTPATIENT)
Age: 52
End: 2022-12-05

## 2022-12-21 DIAGNOSIS — G47.33 OBSTRUCTIVE SLEEP APNEA (ADULT) (PEDIATRIC): ICD-10-CM

## 2023-01-04 ENCOUNTER — APPOINTMENT (OUTPATIENT)
Dept: CT IMAGING | Facility: IMAGING CENTER | Age: 53
End: 2023-01-04
Payer: MEDICAID

## 2023-01-04 ENCOUNTER — OUTPATIENT (OUTPATIENT)
Dept: OUTPATIENT SERVICES | Facility: HOSPITAL | Age: 53
LOS: 1 days | End: 2023-01-04
Payer: MEDICAID

## 2023-01-04 DIAGNOSIS — Z90.5 ACQUIRED ABSENCE OF KIDNEY: Chronic | ICD-10-CM

## 2023-01-04 DIAGNOSIS — Z98.890 OTHER SPECIFIED POSTPROCEDURAL STATES: Chronic | ICD-10-CM

## 2023-01-04 DIAGNOSIS — Z00.8 ENCOUNTER FOR OTHER GENERAL EXAMINATION: ICD-10-CM

## 2023-01-04 DIAGNOSIS — C64.9 MALIGNANT NEOPLASM OF UNSPECIFIED KIDNEY, EXCEPT RENAL PELVIS: ICD-10-CM

## 2023-01-04 PROCEDURE — 74178 CT ABD&PLV WO CNTR FLWD CNTR: CPT | Mod: 26

## 2023-01-04 PROCEDURE — 74178 CT ABD&PLV WO CNTR FLWD CNTR: CPT

## 2023-02-07 ENCOUNTER — NON-APPOINTMENT (OUTPATIENT)
Age: 53
End: 2023-02-07

## 2023-02-07 ENCOUNTER — APPOINTMENT (OUTPATIENT)
Dept: PULMONOLOGY | Facility: CLINIC | Age: 53
End: 2023-02-07
Payer: MEDICAID

## 2023-02-07 VITALS
HEIGHT: 74 IN | HEART RATE: 98 BPM | SYSTOLIC BLOOD PRESSURE: 138 MMHG | BODY MASS INDEX: 36.7 KG/M2 | RESPIRATION RATE: 17 BRPM | OXYGEN SATURATION: 96 % | WEIGHT: 286 LBS | DIASTOLIC BLOOD PRESSURE: 88 MMHG | TEMPERATURE: 97.6 F

## 2023-02-07 DIAGNOSIS — D50.9 IRON DEFICIENCY ANEMIA, UNSPECIFIED: ICD-10-CM

## 2023-02-07 PROCEDURE — 99214 OFFICE O/P EST MOD 30 MIN: CPT | Mod: 25

## 2023-02-07 PROCEDURE — 94010 BREATHING CAPACITY TEST: CPT

## 2023-02-07 PROCEDURE — 95012 NITRIC OXIDE EXP GAS DETER: CPT

## 2023-02-07 NOTE — PROCEDURE
[FreeTextEntry1] : chest CT (10.26.2022) revealed No suspicious nodules.\par Lung-RADS Category 1: Negative study. No nodules and/or definitely benign nodules. Continue annual screening with low-dose CT in 12 months.\par \par Sleep study (11.18.2022) revealed no sleep apnea with an AHI/ESPERANZA of 4.3, and a low oxygen saturation of 82%\par \par PFT reveals mild restrictive and moderate to severe obstructive dysfunction , with an FEV1 of  2.00L, which is 46% of predicted, with a normal flow volume loop. \par \par FENO was 9; a normal value being less than 25\par Fractional exhaled nitric oxide (FENO) is regarded as a simple, noninvasive method for assessing eosinophilic airway inflammation. Produced by a variety of cells within the lung, nitric oxide (NO) concentrations are generally low in healthy individuals. However, high concentrations of NO appear to be involved in nonspecific host defense mechanisms and chronic inflammatory diseases such as asthma. The American Thoracic Society (ATS) therefore has recommended using FENO to aid in the diagnosis and monitoring of eosinophilic airway inflammation and asthma, and for identifying steroid responsive individuals whose chronic respiratory symptoms may be caused by airway inflammation.

## 2023-02-07 NOTE — ASSESSMENT
[FreeTextEntry1] : Mr. MARIA is a 52 year year old male coming into the office today for an initial evaluation with a prior history of hypernephroma of the left kidney s/p left nephrectomy and partial nephrectomy on the right, bilateral renal cell carcinoma, high blood pressure, BPH , GERD, overweight, nicotine addiction who now comes in for a pulmonary evaluation for SOB, COPD, GERD, likely SANDIE, ?pulmonary embolism (doubtful based on recent echo).- stable -s/p sleeve (60+ lb wt loss)- improved\par \par The patient's SOB is felt to be multifactorial:\par - Overweight\par - Out-of Shape\par - Poor breathing mechanics \par - Pulmonary\par    - Combined restrictive and obstructive dysfunction \par    - ?PE/ CHF \par - Cardiac \par    - Dr. De Guzman \par \par Problem 1: Restrictive Dysfunction \par - Due to weight \par - Recommended weight loss\par - Recommended weight loss coaches Felix and Aamir \par \par Problem 2: Obstructive Dysfunction \par - smoking hx; likely COPD \par - continue Trelegy 200 1 inhalation QD \par \par Problem 3: Likely COPD by Hx\par - COPD is a progressive disease and although it can’t be cured , appropriate management can slow its progression, reduce frequency and severity of exacerbations, and improve symptoms and the patient quality of life. Hospitalizations are the greatest contributor to the total COPD costs and account for up to 87% of total COPD related costs. Exacerbations are the main cause of admissions and subsequently account for the 40-75% of COPD costs. Inhaled maintenance therapy reduces the incidence of exacerbations in patients with stable COPD. Incorrect inhaler use and nonadherence are major obstacles to achieving COPD treatment goals. Many COPD patients have challenges (impaired inhalation, limited dexterity, reduced cognition: that limit their ability to correctly use their COPD treatment devices resulting in reduced symptom control. Of most importance is smoking cessation and early intervention with respiratory illnesses and contemplation for pulmonary rehab to enhance quality of life. \par \par Problem 4: ?PE/ CHF \par - Check BMP and D-Dimer \par - Repeat Echo cardiogram \par \par Problem 5: GERD\par - add Pepcid 40 mg QHS PRN\par -Rule of 2s: avoid eating too much, eating too late, eating too spicy, eating two hours before bed.\par - Things to avoid including overeating, spicy foods, tight clothing, eating within two hours of bed, this list is not all inclusive.\par - For treatments of reflux, possible options discussed including diet control, H2 blockers, PPIs, as well as coating motility agents discussed as treatment options. Timing of meals and proximity of last meal to sleep were discussed. If symptoms persist, a formal gastrointestinal evaluation is needed. \par \par Problem 6: (+)Anemia\par - s/p Iron studies - low\par   -recheck\par \par Problem 7: Poor Sleep; no SANDIE \par -s/p home sleep study (AHI 4.3)\par - Sleep apnea is associated with adverse clinical consequences which an affect most organ systems. Cardiovascular disease risk includes arrhythmias, atrial fibrillation, hypertension, coronary artery disease, and stroke. Metabolic disorders include diabetes type 2, non-alcoholic fatty liver disease. Mood disorder especially depression; and cognitive decline especially in the elderly. Associations with chronic reflux/Pandya’s esophagus some but not all inclusive. \par -Reasons include arousal consistent with hypopnea; respiratory events most prominent in REM sleep or supine position; therefore sleep staging and body position are important for accurate diagnosis and estimation of AHI. \par \par Problem 8: Lung Cancer Screening (2/7/2023)\par -next CT 10/2023\par - All patients with the diagnosis of lung cancer regardless of stage will need to see an oncologist for evaluation as the treatments/prophylaxis is forever changing/evolving. You may also need a radiation oncology evaluation for potential therapy - to be decided by an oncologist, pulmonologist, or thoracic surgeon based on the extent of the disease. \par \par Problem 9: Nicotine addiction (Discussed 2/7/2023)\par - Discussed for ten minutes with the patient the risks/associations with continued smoking including COPD, emphysema, shortness of breath, renal cancer, bladder cancer, stroke risk, cardiac disease, etc. Smoking cessation was discussed at length and highly encouraged. Various options to aid cessation was discussed including use of Chantix, Nicotrol, nicotine products, laser therapy, hypnosis, Wellbutrin, etc. \par \par Problem 10: COVID-19 Education\par - Recommended SaNotize \par \par Problem 11: Cardiac (Dr. De Guzman) \par -Recommended cardiac follow up evaluation with cardiologist \par \par Problem 12: Overweight/out of shape\par - Recommended weight loss  Rossana \par - Weight loss, exercise, and diet control were discussed and are highly encouraged. Treatment options were given such as, aqua therapy, and contacting a nutritionist. Recommended to use the elliptical, stationary bike, less use of treadmill. Mindful eating was explained to the patient Obesity is associated with worsening asthma, shortness of breath, and potential for cardiac disease, diabetes, and other underlying medical conditions\par \par Problem 13: Poor Breathing Mechanics\par - Recommended Wim Hof and Buteyko breathing techniques \par - Proper breathing techniques were reviewed with an emphasis of exhalation. Patient instructed to breath in for 1 second and out for four seconds. Patient was encouraged to not talk while walking.\par \par Problem 14: Health maintenance\par -s/p flu shot\par -recommended strep pneumonia vaccines: Prevnar-20 vaccine, followed by Pneumo vaccine 23 one year following\par -recommended early intervention for URIs\par -recommended regular osteoporosis evaluations-recommended early dermatological evaluations\par -recommended after the age of 50 to the age of 70, colonoscopy every 5 years\par \par \par Follow up in 3-4 months\par pt is encouraged to call or fax the office with any questions or concerns.\par -Education provided to the PT regarding their visit and conditions.

## 2023-02-07 NOTE — PHYSICAL EXAM
[No Acute Distress] : no acute distress [Normal Oropharynx] : normal oropharynx [III] : Mallampati Class: III [Normal Appearance] : normal appearance [No Neck Mass] : no neck mass [Normal Rate/Rhythm] : normal rate/rhythm [Normal S1, S2] : normal s1, s2 [No Murmurs] : no murmurs [No Resp Distress] : no resp distress [Clear to Auscultation Bilaterally] : clear to auscultation bilaterally [No Abnormalities] : no abnormalities [Benign] : benign [Normal Gait] : normal gait [No Clubbing] : no clubbing [No Cyanosis] : no cyanosis [FROM] : FROM [Normal Color/ Pigmentation] : normal color/ pigmentation [No Focal Deficits] : no focal deficits [Oriented x3] : oriented x3 [Normal Affect] : normal affect [TextBox_2] : OW [TextBox_68] : I:E 1:3, clear  [TextBox_105] : 2+ LE Edema

## 2023-02-07 NOTE — HISTORY OF PRESENT ILLNESS
[TextBox_4] : Mr. MARIA is a 52 year old male presenting to the office today for f/p pulmonary evaluation. His chief complaint is-\par \par -he notes losing weight (62lbs in 2 months)\par -s/p gastric sleeve surgery\par -he denies dysphonia \par -he notes energy levels are 8/10\par -he notes limited exercising due to weather\par -he notes good quality of sleep \par -he denies snoring\par -he notes continuing to smoke, but decreased amount \par \par -he denies any headaches, nausea, emesis, fever, chills, sweats, chest pain, chest pressure, coughing, wheezing, palpitations, constipation, diarrhea, vertigo, dysphagia, heartburn, reflux, itchy eyes, itchy ears, leg swelling, leg pain, arthralgias, myalgias, or sour taste in the mouth.

## 2023-02-07 NOTE — ADDENDUM
[FreeTextEntry1] : Documented by UZIEL Saldivar acting as a scribe for Dr. Willie Patterson on 02/07/2023 .\par \par All medical record entries made by the Scribe were at my, Dr. Willie Patterson's, direction and personally dictated by me on 02/07/2023. I have reviewed the chart and agree that the record accurately reflects my personal performance of the history, physical exam, assessment and plan. I have also personally directed, reviewed, and agree with the discharge instructions.

## 2023-02-08 ENCOUNTER — RX RENEWAL (OUTPATIENT)
Age: 53
End: 2023-02-08

## 2023-03-23 ENCOUNTER — APPOINTMENT (OUTPATIENT)
Dept: UROLOGY | Facility: CLINIC | Age: 53
End: 2023-03-23
Payer: MEDICAID

## 2023-03-23 PROCEDURE — 99214 OFFICE O/P EST MOD 30 MIN: CPT

## 2023-03-27 ENCOUNTER — RX RENEWAL (OUTPATIENT)
Age: 53
End: 2023-03-27

## 2023-04-10 ENCOUNTER — RX RENEWAL (OUTPATIENT)
Age: 53
End: 2023-04-10

## 2023-04-10 NOTE — PROGRESS NOTE ADULT - SUBJECTIVE AND OBJECTIVE BOX
Note    Post op Check    s/p Exploratory laparotomy, left open radical nephrectomy.  EBL 600ml  Hypotension in the PACU requiring Albumin 250mg IV x2.   NGT, chest tube, PCEA, and shukla in place.    Patient seen and examined, reporting discomfort with the NGT, otherwise pain is well controlled with PCEA.  Denies nausea.    Vital Signs Last 24 Hrs  T(C): 36.3 (03 Sep 2021 14:00), Max: 36.8 (03 Sep 2021 07:00)  T(F): 97.3 (03 Sep 2021 14:00), Max: 98.3 (03 Sep 2021 07:00)  HR: 95 (03 Sep 2021 19:16) (73 - 110)  BP: 145/86 (03 Sep 2021 19:00) (97/61 - 168/94)  BP(mean): 100 (03 Sep 2021 19:00) (67 - 102)  RR: 16 (03 Sep 2021 19:00) (11 - 24)  SpO2: 98% (03 Sep 2021 19:16) (91% - 100%)    I&O's Summary    03 Sep 2021 07:01  -  03 Sep 2021 21:27  --------------------------------------------------------  IN: 1730 mL / OUT: 900 mL / NET: 830 mL    PHYSICAL EXAM:   Constitutional: well appearing, no distress    Respiratory: clear, chest tube in place.     Cardiovascular: tachycardic but regular    Gastrointestinal: soft, nontender, dressing is clean and dry.  NGT in place, no drainage.      Genitourinary: shukla in place, draining well, clear yellow urine    Extremities: venodynes in place     LABS:                        13.1   13.22 )-----------( 205      ( 03 Sep 2021 13:22 )             42.0       09-03    137  |  103  |  18  ----------------------------<  172<H>  4.4   |  26  |  1.12    Ca    8.2<L>      03 Sep 2021 13:22  Phos  3.9     09-03  Mg     1.80     09-03    Note    Post op Check    s/p Exploratory laparotomy, left open radical nephrectomy.  EBL 600ml  Hypotension in the PACU requiring Albumin 250mg IV x2.   NGT, chest tube, PCEA, and shukla in place.    Patient seen and examined, reporting discomfort with the NGT, otherwise pain is well controlled with PCEA.  Denies nausea.    Vital Signs Last 24 Hrs  T(C): 36.3 (03 Sep 2021 14:00), Max: 36.8 (03 Sep 2021 07:00)  T(F): 97.3 (03 Sep 2021 14:00), Max: 98.3 (03 Sep 2021 07:00)  HR: 95 (03 Sep 2021 19:16) (73 - 110)  BP: 145/86 (03 Sep 2021 19:00) (97/61 - 168/94)  BP(mean): 100 (03 Sep 2021 19:00) (67 - 102)  RR: 16 (03 Sep 2021 19:00) (11 - 24)  SpO2: 98% (03 Sep 2021 19:16) (91% - 100%)    I&O's Summary    03 Sep 2021 07:01  -  03 Sep 2021 21:27  --------------------------------------------------------  IN: 1730 mL / OUT: 900 mL / NET: 830 mL    PHYSICAL EXAM:   Constitutional: well appearing, no distress    Respiratory: clear, chest tube in place.     Cardiovascular: tachycardic but regular    Gastrointestinal: soft, nontender, dressing is clean and dry.  NGT in place, no drainage.      Genitourinary: shukla in place, draining well, clear yellow urine    Extremities: venodynes in place     LABS:                        13.1   13.22 )-----------( 205      ( 03 Sep 2021 13:22 )             42.0       09-03    137  |  103  |  18  ----------------------------<  172<H>  4.4   |  26  |  1.12    Ca    8.2<L>      03 Sep 2021 13:22  Phos  3.9     09-03  Mg     1.80     09-03    < from: Xray Chest 1 View- PORTABLE-Urgent (Xray Chest 1 View- PORTABLE-Urgent .) (09.03.21 @ 12:54) >  INTERPRETATION:    Left-sided chest tube and enteric tube are in position. Lungs are clear, heart is not enlarged considering technique and there are no effusions or pneumothorax.      COMPARISON:  None available      IMPRESSION:  Clear lungs with enteric and left chest tube in place.    ---End of Report ---    FRANKLYN CARRANZA MD; Attending Radiologist  This document has been electronically signed. Sep  3 2021  1:11PM    < end of copied text >     Protopic Pregnancy And Lactation Text: This medication is Pregnancy Category C. It is unknown if this medication is excreted in breast milk when applied topically.

## 2023-05-08 NOTE — PROGRESS NOTE ADULT - SUBJECTIVE AND OBJECTIVE BOX
Overnight events:  None    Subjective:  Pt states he feels well, ambulating to BR without problem, no CP, SOB, passed lots of flatus, no N/V, no BM yet    Objective:    Vital signs  T(C): , Max: 37 (09-06-21 @ 23:45)  HR: 86 (09-07-21 @ 05:50)  BP: 155/83 (09-07-21 @ 05:50)  SpO2: 99% (09-07-21 @ 05:50)  Wt(kg): --    Output   Gayla: 1000 yellow  09-06 @ 07:01  -  09-07 @ 07:00  --------------------------------------------------------  IN: 3550 mL / OUT: 1700 mL / NET: 1850 mL        Gen: NAD, sitting comfortably in chair  Abd: obese, staples c/d/i, soft, nontender, CT dressing soiled  : gayla secured    Labs                        8.6    7.01  )-----------( 147      ( 07 Sep 2021 06:46 )             28.1     07 Sep 2021 06:46    133    |  98     |  9      ----------------------------<  62     4.1     |  19     |  0.66     Ca    7.3        07 Sep 2021 06:46  Phos  1.6       07 Sep 2021 06:46  Mg     1.20      07 Sep 2021 06:46         normal...

## 2023-06-09 NOTE — H&P PST ADULT - LYMPH NODES
24 F   Do not be overaggressive  See dietitian  Check your blood pressure every day  If your systolic blood pressure is less than 100 do not take losartan or hydrochlorothiazide that day  You may take amlodipine and metoprolol  Recommend CT scan of the chest abdomen and pelvis for the weight loss evaluation without IV contrast   Recommend mammogram   Recommend CBC CMP Melamin A1c TSH and free T4  Recommend with weekly weight or weight 3 times a week  Check blood pressure every day  Follow-up back in 1 month  Follow with Consultants as per their and our suggestion    Follow up in one month or as needed earlier    Follow all instructions as advised and discussed  Take your medications as prescribed  Call the office immediately if you experience any side effects  Ask questions if you do not understand  Keep your scheduled appointment as advised or come sooner if necessary or in doubt  Best time to call for non-urgent matter or questions on weekdays is between 9am and 12 noon  See physician for any new symptoms or worsening of current symptoms  Urgent or emergent situations call 911 and report to nearest emergency room  I spent  time taking care of this patient including clinical care, conseling, collaboration, chart, lab and consultant's follow up note,images report, documentation, pre visit  review as appropriate      Patient is to get labs 1 week(s) prior to next visit if advised
No lymphadedenopathy

## 2023-06-12 ENCOUNTER — RX RENEWAL (OUTPATIENT)
Age: 53
End: 2023-06-12

## 2023-06-20 ENCOUNTER — APPOINTMENT (OUTPATIENT)
Dept: PULMONOLOGY | Facility: CLINIC | Age: 53
End: 2023-06-20
Payer: MEDICAID

## 2023-06-20 VITALS
BODY MASS INDEX: 33.11 KG/M2 | RESPIRATION RATE: 16 BRPM | HEART RATE: 87 BPM | SYSTOLIC BLOOD PRESSURE: 140 MMHG | TEMPERATURE: 98.6 F | HEIGHT: 74 IN | DIASTOLIC BLOOD PRESSURE: 78 MMHG | WEIGHT: 258 LBS | OXYGEN SATURATION: 97 %

## 2023-06-20 DIAGNOSIS — K21.9 GASTRO-ESOPHAGEAL REFLUX DISEASE W/OUT ESOPHAGITIS: ICD-10-CM

## 2023-06-20 PROCEDURE — 94729 DIFFUSING CAPACITY: CPT

## 2023-06-20 PROCEDURE — 94010 BREATHING CAPACITY TEST: CPT

## 2023-06-20 PROCEDURE — 94727 GAS DIL/WSHOT DETER LNG VOL: CPT

## 2023-06-20 PROCEDURE — 99214 OFFICE O/P EST MOD 30 MIN: CPT | Mod: 25

## 2023-06-20 PROCEDURE — ZZZZZ: CPT

## 2023-06-20 PROCEDURE — 95012 NITRIC OXIDE EXP GAS DETER: CPT

## 2023-06-20 NOTE — HISTORY OF PRESENT ILLNESS
Patient had a 3-month repeat ultrasound of the right breast due to previous biopsy showing fibroadenoma.  There is a slight decrease in size of the right breast fibroadenoma.  According to the reading radiologist it is compatible with a pathological diagnosis of fibroadenoma.  I did review the results of the ultrasound with the patient today via phone.    Recommendation that patient have a repeat ultrasound in approximately 1 year of the right breast to confirm stability of the fibroadenoma.  I will have patient follow-up with her PCP for this repeat ultrasound at this time.  Patient for was understanding was in a agreement with the plan.    No further follow-up needed with the [facility].      Us-breast Limited-right    Result Date: 4/29/2019 4/29/2019 1:00 PM HISTORY/REASON FOR EXAM:  Follow-up benign biopsy right breast TECHNIQUE/EXAM DESCRIPTION AND NUMBER OF VIEWS: Right breast ultrasound. COMPARISON:   9/11/2018 FINDINGS: Ultrasound images right breast 11:00 position 2 cm from the nipple demonstrate a 1.1 x 1.0 x 0.9 cm hypoechoic mass with echogenic clip marker in the periphery. It is slightly smaller than on previous exam and is compatible with a pathologic diagnosis of  fibroadenoma.    Slight decrease in size of right breast fibroadenoma 11:00 position following percutaneous biopsy. These results were given to the patient at the time of visit. R2 - Category 2:  Benign Finding(s)     [TextBox_4] : Mr. MARIA is a 52 year old male presenting to the office today for f/p pulmonary evaluation. His chief complaint is-\par \par -he notes feeling generally well \par -he notes bowels are regular \par -he notes sleeping 6-7 hours\par -he denies snoring\par -he notes good quality sleep\par -he notes losing 100 lbs\par -he denies PND\par -he denies globus sensation\par -he notes smoking 6-7 cigarettes per day\par -he notes exercising (walking)\par \par -he denies any headaches, nausea, emesis, fever, chills, sweats, chest pain, chest pressure, coughing, wheezing, palpitations, constipation, diarrhea, vertigo, dysphagia, heartburn, reflux, itchy eyes, itchy ears, leg swelling, arthralgias, myalgias, or sour taste in the mouth.\par  Statement Selected

## 2023-06-20 NOTE — ADDENDUM
[FreeTextEntry1] : Documented by Tomasa Campbell acting as a scribe for Dr. Willie Patterson on 06/20/2023 .\par \par All medical record entries made by the Scribe were at my, Dr. Willie Patterson's, direction and personally dictated by me on 06/20/2023. I have reviewed the chart and agree that the record accurately reflects my personal performance of the history, physical exam, assessment and plan. I have also personally directed, reviewed, and agree with the discharge instructions.

## 2023-06-20 NOTE — ASSESSMENT
[FreeTextEntry1] : Mr. MARIA is a 52 year year old male coming into the office today for an initial evaluation with a prior history of hypernephroma of the left kidney s/p left nephrectomy and partial nephrectomy on the right, bilateral renal cell carcinoma, high blood pressure, BPH , GERD, overweight, nicotine addiction who now comes in for a pulmonary evaluation for SOB, COPD, GERD, likely SANDIE, ?pulmonary embolism (doubtful based on recent echo).- stable -s/p sleeve (100+lb wt loss)- improved/stable\par \par The patient's SOB is felt to be multifactorial:\par - Overweight\par - Out-of Shape\par - Poor breathing mechanics \par - Pulmonary\par    - Combined restrictive and obstructive dysfunction \par    - ?PE/ CHF \par - Cardiac \par    - Dr. De Guzman \par \par Problem 1: Restrictive Dysfunction \par - Due to weight \par - Recommended weight loss\par - Recommended weight loss coaches Felix and Aamir \par \par Problem 2: Obstructive Dysfunction \par - smoking hx; likely COPD \par - continue Trelegy 200 1 inhalation QD \par \par Problem 3: Likely COPD by Hx\par - COPD is a progressive disease and although it can’t be cured , appropriate management can slow its progression, reduce frequency and severity of exacerbations, and improve symptoms and the patient quality of life. Hospitalizations are the greatest contributor to the total COPD costs and account for up to 87% of total COPD related costs. Exacerbations are the main cause of admissions and subsequently account for the 40-75% of COPD costs. Inhaled maintenance therapy reduces the incidence of exacerbations in patients with stable COPD. Incorrect inhaler use and nonadherence are major obstacles to achieving COPD treatment goals. Many COPD patients have challenges (impaired inhalation, limited dexterity, reduced cognition: that limit their ability to correctly use their COPD treatment devices resulting in reduced symptom control. Of most importance is smoking cessation and early intervention with respiratory illnesses and contemplation for pulmonary rehab to enhance quality of life. \par \par Problem 4: ?PE/ CHF - not present\par - s/p BMP and D-Dimer \par - Repeat Echo cardiogram (10/2023)\par \par Problem 5: GERD\par - add Pepcid 40 mg QHS PRN\par -Rule of 2s: avoid eating too much, eating too late, eating too spicy, eating two hours before bed.\par - Things to avoid including overeating, spicy foods, tight clothing, eating within two hours of bed, this list is not all inclusive.\par - For treatments of reflux, possible options discussed including diet control, H2 blockers, PPIs, as well as coating motility agents discussed as treatment options. Timing of meals and proximity of last meal to sleep were discussed. If symptoms persist, a formal gastrointestinal evaluation is needed. \par \par Problem 6: (+)Anemia\par - s/p Iron studies - low\par   -recheck\par \par Problem 7: Poor Sleep; no SANDIE \par -s/p home sleep study (AHI 4.3)\par - Sleep apnea is associated with adverse clinical consequences which an affect most organ systems. Cardiovascular disease risk includes arrhythmias, atrial fibrillation, hypertension, coronary artery disease, and stroke. Metabolic disorders include diabetes type 2, non-alcoholic fatty liver disease. Mood disorder especially depression; and cognitive decline especially in the elderly. Associations with chronic reflux/Pandya’s esophagus some but not all inclusive. \par -Reasons include arousal consistent with hypopnea; respiratory events most prominent in REM sleep or supine position; therefore sleep staging and body position are important for accurate diagnosis and estimation of AHI. \par \par Problem 8: Lung Cancer Screening (2/7/2023)\par -next CT 10/2023\par - All patients with the diagnosis of lung cancer regardless of stage will need to see an oncologist for evaluation as the treatments/prophylaxis is forever changing/evolving. You may also need a radiation oncology evaluation for potential therapy - to be decided by an oncologist, pulmonologist, or thoracic surgeon based on the extent of the disease. \par \par Problem 9: Nicotine addiction (Discussed 06/20/2023)\par - Discussed for ten minutes with the patient the risks/associations with continued smoking including COPD, emphysema, shortness of breath, renal cancer, bladder cancer, stroke risk, cardiac disease, etc. Smoking cessation was discussed at length and highly encouraged. Various options to aid cessation was discussed including use of Chantix, Nicotrol, nicotine products, laser therapy, hypnosis, Wellbutrin, etc. \par \par Problem 10: COVID-19 Education\par - Recommended SaNotize \par \par Problem 11: Cardiac (Dr. De Guzman) \par -Recommended cardiac follow up evaluation with cardiologist \par \par Problem 12: Overweight/out of shape\par - Recommended weight loss  Rossana \par - Weight loss, exercise, and diet control were discussed and are highly encouraged. Treatment options were given such as, aqua therapy, and contacting a nutritionist. Recommended to use the elliptical, stationary bike, less use of treadmill. Mindful eating was explained to the patient Obesity is associated with worsening asthma, shortness of breath, and potential for cardiac disease, diabetes, and other underlying medical conditions\par \par Problem 13: Poor Breathing Mechanics\par - Recommended Wim Hof and Buteyko breathing techniques \par - Proper breathing techniques were reviewed with an emphasis of exhalation. Patient instructed to breath in for 1 second and out for four seconds. Patient was encouraged to not talk while walking.\par \par Problem 14: Health maintenance\par -s/p flu shot\par -recommended strep pneumonia vaccines: Prevnar-20 vaccine, followed by Pneumo vaccine 23 one year following\par -recommended early intervention for URIs\par -recommended regular osteoporosis evaluations-recommended early dermatological evaluations\par -recommended after the age of 50 to the age of 70, colonoscopy every 5 years\par \par \par Follow up in 3-4 months\par pt is encouraged to call or fax the office with any questions or concerns.\par -Education provided to the PT regarding their visit and conditions.

## 2023-06-20 NOTE — PROCEDURE
[FreeTextEntry1] : Full PFT reveals mild restrictive and moderate obstructive dysfunction; FEV1 was  2.31L which is 52% of predicted; normal lung volumes; moderately reduced diffusion at 15.5, which is 46% of predicted; normal flow volume loop.\par PFTs were performed to evaluate for SOB, asthma\par \par FENO was 10; a normal value being less than 25\par Fractional exhaled nitric oxide (FENO) is regarded as a simple, noninvasive method for assessing eosinophilic airway inflammation. Produced by a variety of cells within the lung, nitric oxide (NO) concentrations are generally low in healthy individuals. However, high concentrations of NO appear to be involved in nonspecific host defense mechanisms and chronic inflammatory diseases such as asthma. The American Thoracic Society (ATS) therefore has recommended using FENO to aid in the diagnosis and monitoring of eosinophilic airway inflammation and asthma, and for identifying steroid responsive individuals whose chronic respiratory symptoms may be caused by airway inflammation.

## 2023-06-20 NOTE — PHYSICAL EXAM
[No Acute Distress] : no acute distress [Normal Oropharynx] : normal oropharynx [III] : Mallampati Class: III [Normal Appearance] : normal appearance [No Neck Mass] : no neck mass [Normal Rate/Rhythm] : normal rate/rhythm [Normal S1, S2] : normal s1, s2 [No Murmurs] : no murmurs [No Resp Distress] : no resp distress [Clear to Auscultation Bilaterally] : clear to auscultation bilaterally [No Abnormalities] : no abnormalities 20 [Benign] : benign [Normal Gait] : normal gait [No Clubbing] : no clubbing [No Cyanosis] : no cyanosis [FROM] : FROM [Normal Color/ Pigmentation] : normal color/ pigmentation [No Focal Deficits] : no focal deficits [Oriented x3] : oriented x3 [Normal Affect] : normal affect [TextBox_2] : OW [TextBox_68] : I:E 1:3, clear  [TextBox_105] : 2+ LE Edema

## 2023-06-20 NOTE — REASON FOR VISIT
[Follow-Up] : a follow-up visit [TextBox_44] : SOB, COPD, GERD, likely SANDIE, ?pulmonary embolism, nicotine addiction

## 2023-07-13 NOTE — ASU PATIENT PROFILE, ADULT - MEDICATION ADMINISTRATION INFO, PROFILE
Medical Auth request received for speech therapy, PT/OT. Last seen for well exam 5/16/23. Placed in folder for review.    no concerns

## 2023-08-14 ENCOUNTER — RX RENEWAL (OUTPATIENT)
Age: 53
End: 2023-08-14

## 2023-09-12 ENCOUNTER — RX RENEWAL (OUTPATIENT)
Age: 53
End: 2023-09-12

## 2023-11-27 NOTE — PHYSICAL THERAPY INITIAL EVALUATION ADULT - SITTING BALANCE: STATIC
Pt arrives requesting covid test. Pt was exposed to covid on Thanksgiving, and yesterday started with left sided head pain and a raspy voice. Took a home covid test and believes it was positive.    good balance 3 children alive and well

## 2023-12-12 ENCOUNTER — APPOINTMENT (OUTPATIENT)
Dept: PULMONOLOGY | Facility: CLINIC | Age: 53
End: 2023-12-12
Payer: MEDICAID

## 2023-12-12 VITALS
SYSTOLIC BLOOD PRESSURE: 140 MMHG | HEART RATE: 101 BPM | RESPIRATION RATE: 16 BRPM | WEIGHT: 245 LBS | DIASTOLIC BLOOD PRESSURE: 88 MMHG | OXYGEN SATURATION: 98 % | HEIGHT: 74 IN | TEMPERATURE: 97.1 F | BODY MASS INDEX: 31.44 KG/M2

## 2023-12-12 DIAGNOSIS — R06.02 SHORTNESS OF BREATH: ICD-10-CM

## 2023-12-12 DIAGNOSIS — Z72.820 SLEEP DEPRIVATION: ICD-10-CM

## 2023-12-12 DIAGNOSIS — J44.9 CHRONIC OBSTRUCTIVE PULMONARY DISEASE, UNSPECIFIED: ICD-10-CM

## 2023-12-12 DIAGNOSIS — F17.200 NICOTINE DEPENDENCE, UNSPECIFIED, UNCOMPLICATED: ICD-10-CM

## 2023-12-12 DIAGNOSIS — E66.3 OVERWEIGHT: ICD-10-CM

## 2023-12-12 DIAGNOSIS — Z01.818 ENCOUNTER FOR OTHER PREPROCEDURAL EXAMINATION: ICD-10-CM

## 2023-12-12 PROCEDURE — 94010 BREATHING CAPACITY TEST: CPT

## 2023-12-12 PROCEDURE — 99214 OFFICE O/P EST MOD 30 MIN: CPT | Mod: 25

## 2023-12-12 PROCEDURE — 99406 BEHAV CHNG SMOKING 3-10 MIN: CPT | Mod: 25

## 2023-12-12 PROCEDURE — 94727 GAS DIL/WSHOT DETER LNG VOL: CPT

## 2023-12-12 PROCEDURE — 95012 NITRIC OXIDE EXP GAS DETER: CPT

## 2023-12-12 PROCEDURE — 94729 DIFFUSING CAPACITY: CPT

## 2023-12-12 RX ORDER — ALBUTEROL SULFATE 90 UG/1
108 (90 BASE) AEROSOL, METERED RESPIRATORY (INHALATION) EVERY 6 HOURS
Qty: 1 | Refills: 1 | Status: ACTIVE | COMMUNITY
Start: 2023-12-12 | End: 1900-01-01

## 2023-12-18 ENCOUNTER — RX RENEWAL (OUTPATIENT)
Age: 53
End: 2023-12-18

## 2023-12-20 NOTE — ASU PREOP CHECKLIST - TEMPERATURE IN CELSIUS (DEGREES C)
I was able to reach him by telephone regarding his abnormal SPEP with MGUS.  I discussed Hematology evaluation.  He will be expecting a phone call to schedule.  
36.8

## 2024-01-09 ENCOUNTER — NON-APPOINTMENT (OUTPATIENT)
Age: 54
End: 2024-01-09

## 2024-01-09 VITALS — BODY MASS INDEX: 31.44 KG/M2 | HEIGHT: 74 IN | WEIGHT: 245 LBS

## 2024-01-09 DIAGNOSIS — F17.210 NICOTINE DEPENDENCE, CIGARETTES, UNCOMPLICATED: ICD-10-CM

## 2024-01-17 ENCOUNTER — APPOINTMENT (OUTPATIENT)
Dept: CT IMAGING | Facility: IMAGING CENTER | Age: 54
End: 2024-01-17

## 2024-02-05 ENCOUNTER — RX RENEWAL (OUTPATIENT)
Age: 54
End: 2024-02-05

## 2024-02-06 RX ORDER — ALBUTEROL SULFATE 90 UG/1
108 (90 BASE) INHALANT RESPIRATORY (INHALATION)
Qty: 3 | Refills: 1 | Status: ACTIVE | COMMUNITY
Start: 2024-02-06 | End: 1900-01-01

## 2024-02-21 ENCOUNTER — RX RENEWAL (OUTPATIENT)
Age: 54
End: 2024-02-21

## 2024-02-21 RX ORDER — FLUTICASONE FUROATE, UMECLIDINIUM BROMIDE AND VILANTEROL TRIFENATATE 200; 62.5; 25 UG/1; UG/1; UG/1
200-62.5-25 POWDER RESPIRATORY (INHALATION)
Qty: 3 | Refills: 1 | Status: ACTIVE | COMMUNITY
Start: 2022-10-07 | End: 1900-01-01

## 2024-03-07 ENCOUNTER — RX RENEWAL (OUTPATIENT)
Age: 54
End: 2024-03-07

## 2024-03-07 RX ORDER — FAMOTIDINE 40 MG/1
40 TABLET, FILM COATED ORAL
Qty: 90 | Refills: 1 | Status: ACTIVE | COMMUNITY
Start: 2022-10-07 | End: 1900-01-01

## 2024-04-04 ENCOUNTER — APPOINTMENT (OUTPATIENT)
Dept: UROLOGY | Facility: CLINIC | Age: 54
End: 2024-04-04
Payer: MEDICAID

## 2024-04-04 DIAGNOSIS — N40.1 BENIGN PROSTATIC HYPERPLASIA WITH LOWER URINARY TRACT SYMPMS: ICD-10-CM

## 2024-04-04 DIAGNOSIS — N13.8 BENIGN PROSTATIC HYPERPLASIA WITH LOWER URINARY TRACT SYMPMS: ICD-10-CM

## 2024-04-04 DIAGNOSIS — C64.9 MALIGNANT NEOPLASM OF UNSPECIFIED KIDNEY, EXCEPT RENAL PELVIS: ICD-10-CM

## 2024-04-04 PROCEDURE — G2211 COMPLEX E/M VISIT ADD ON: CPT | Mod: NC,1L

## 2024-04-04 PROCEDURE — 99214 OFFICE O/P EST MOD 30 MIN: CPT

## 2024-04-04 RX ORDER — TADALAFIL 20 MG/1
20 TABLET ORAL
Qty: 20 | Refills: 3 | Status: ACTIVE | COMMUNITY
Start: 2024-04-04 | End: 1900-01-01

## 2024-04-05 LAB
ANION GAP SERPL CALC-SCNC: 12 MMOL/L
APPEARANCE: CLEAR
BACTERIA: NEGATIVE /HPF
BILIRUBIN URINE: NEGATIVE
BLOOD URINE: NEGATIVE
BUN SERPL-MCNC: 21 MG/DL
CALCIUM SERPL-MCNC: 9.4 MG/DL
CAST: 0 /LPF
CHLORIDE SERPL-SCNC: 98 MMOL/L
CO2 SERPL-SCNC: 27 MMOL/L
COLOR: YELLOW
CREAT SERPL-MCNC: 1.31 MG/DL
EGFR: 65 ML/MIN/1.73M2
EPITHELIAL CELLS: 0 /HPF
GLUCOSE QUALITATIVE U: NEGATIVE MG/DL
GLUCOSE SERPL-MCNC: 75 MG/DL
KETONES URINE: NEGATIVE MG/DL
LEUKOCYTE ESTERASE URINE: NEGATIVE
MICROSCOPIC-UA: NORMAL
NITRITE URINE: NEGATIVE
PH URINE: 5.5
POTASSIUM SERPL-SCNC: 4.3 MMOL/L
PROTEIN URINE: NORMAL MG/DL
PSA FREE FLD-MCNC: 29 %
PSA FREE SERPL-MCNC: 0.58 NG/ML
PSA SERPL-MCNC: 2.01 NG/ML
RED BLOOD CELLS URINE: 1 /HPF
SODIUM SERPL-SCNC: 138 MMOL/L
SPECIFIC GRAVITY URINE: 1.02
UROBILINOGEN URINE: 0.2 MG/DL
WHITE BLOOD CELLS URINE: 1 /HPF

## 2024-04-10 ENCOUNTER — OUTPATIENT (OUTPATIENT)
Dept: OUTPATIENT SERVICES | Facility: HOSPITAL | Age: 54
LOS: 1 days | End: 2024-04-10
Payer: MEDICAID

## 2024-04-10 ENCOUNTER — APPOINTMENT (OUTPATIENT)
Dept: CT IMAGING | Facility: IMAGING CENTER | Age: 54
End: 2024-04-10
Payer: MEDICAID

## 2024-04-10 DIAGNOSIS — Z90.5 ACQUIRED ABSENCE OF KIDNEY: Chronic | ICD-10-CM

## 2024-04-10 DIAGNOSIS — N40.1 BENIGN PROSTATIC HYPERPLASIA WITH LOWER URINARY TRACT SYMPTOMS: ICD-10-CM

## 2024-04-10 DIAGNOSIS — Z98.890 OTHER SPECIFIED POSTPROCEDURAL STATES: Chronic | ICD-10-CM

## 2024-04-10 PROCEDURE — 74178 CT ABD&PLV WO CNTR FLWD CNTR: CPT | Mod: 26

## 2024-04-10 PROCEDURE — 74178 CT ABD&PLV WO CNTR FLWD CNTR: CPT

## 2024-06-11 ENCOUNTER — APPOINTMENT (OUTPATIENT)
Dept: PULMONOLOGY | Facility: CLINIC | Age: 54
End: 2024-06-11

## 2024-09-03 ENCOUNTER — RX RENEWAL (OUTPATIENT)
Age: 54
End: 2024-09-03

## 2024-09-06 NOTE — DIETITIAN INITIAL EVALUATION ADULT. - IDEAL BODY WEIGHT (LBS)
[FreeTextEntry1] : 48-year-old woman with a history of anxiety, attention deficit disorder, for follow-up visit.Last seen in the office August 2023.  Patient reports continues with difficulty with anxiety, sees a therapist, doing cognitive behavioral therapy for attention deficit but struggling.  Also easily distracted, has difficulty staying on task, loses focus easily, finds herself forcing herself to redirect to complete task. Has been frustrating, it does affect her ability to function, Fechter academically. In the past treated with Adderall with excellent results, but her cardiologist recommended discontinuing stimulants due to tachycardia. Has pending workup to be done yet by cardiology. On fluoxetine 40 mg for anxiety, works well, recently ran out would like to restart it. 
Alert-The patient is alert, awake and responds to voice. The patient is oriented to time, place, and person. The triage nurse is able to obtain subjective information.
172

## 2024-09-23 ENCOUNTER — APPOINTMENT (OUTPATIENT)
Dept: PULMONOLOGY | Facility: CLINIC | Age: 54
End: 2024-09-23

## 2024-10-23 ENCOUNTER — RX RENEWAL (OUTPATIENT)
Age: 54
End: 2024-10-23

## 2024-12-05 ENCOUNTER — APPOINTMENT (OUTPATIENT)
Dept: PULMONOLOGY | Facility: CLINIC | Age: 54
End: 2024-12-05
Payer: MEDICAID

## 2024-12-05 VITALS
HEIGHT: 73 IN | WEIGHT: 250 LBS | BODY MASS INDEX: 33.13 KG/M2 | OXYGEN SATURATION: 96 % | TEMPERATURE: 97.8 F | RESPIRATION RATE: 16 BRPM | DIASTOLIC BLOOD PRESSURE: 80 MMHG | SYSTOLIC BLOOD PRESSURE: 130 MMHG | HEART RATE: 80 BPM

## 2024-12-05 DIAGNOSIS — J44.9 CHRONIC OBSTRUCTIVE PULMONARY DISEASE, UNSPECIFIED: ICD-10-CM

## 2024-12-05 DIAGNOSIS — R06.02 SHORTNESS OF BREATH: ICD-10-CM

## 2024-12-05 DIAGNOSIS — F17.200 NICOTINE DEPENDENCE, UNSPECIFIED, UNCOMPLICATED: ICD-10-CM

## 2024-12-05 DIAGNOSIS — D50.9 IRON DEFICIENCY ANEMIA, UNSPECIFIED: ICD-10-CM

## 2024-12-05 DIAGNOSIS — K21.9 GASTRO-ESOPHAGEAL REFLUX DISEASE W/OUT ESOPHAGITIS: ICD-10-CM

## 2024-12-05 DIAGNOSIS — Z72.820 SLEEP DEPRIVATION: ICD-10-CM

## 2024-12-05 PROCEDURE — 94010 BREATHING CAPACITY TEST: CPT

## 2024-12-05 PROCEDURE — 99215 OFFICE O/P EST HI 40 MIN: CPT | Mod: 25

## 2024-12-05 PROCEDURE — 94727 GAS DIL/WSHOT DETER LNG VOL: CPT

## 2024-12-05 PROCEDURE — ZZZZZ: CPT

## 2024-12-05 PROCEDURE — 94729 DIFFUSING CAPACITY: CPT

## 2024-12-05 PROCEDURE — 99406 BEHAV CHNG SMOKING 3-10 MIN: CPT

## 2024-12-05 RX ORDER — BUPROPION HYDROCHLORIDE 150 MG/1
150 TABLET, FILM COATED, EXTENDED RELEASE ORAL
Qty: 90 | Refills: 0 | Status: ACTIVE | COMMUNITY
Start: 2024-12-05 | End: 1900-01-01

## 2024-12-17 ENCOUNTER — APPOINTMENT (OUTPATIENT)
Dept: CT IMAGING | Facility: IMAGING CENTER | Age: 54
End: 2024-12-17
Payer: MEDICAID

## 2024-12-17 ENCOUNTER — OUTPATIENT (OUTPATIENT)
Dept: OUTPATIENT SERVICES | Facility: HOSPITAL | Age: 54
LOS: 1 days | End: 2024-12-17
Payer: MEDICAID

## 2024-12-17 DIAGNOSIS — Z98.890 OTHER SPECIFIED POSTPROCEDURAL STATES: Chronic | ICD-10-CM

## 2024-12-17 DIAGNOSIS — Z90.5 ACQUIRED ABSENCE OF KIDNEY: Chronic | ICD-10-CM

## 2024-12-17 DIAGNOSIS — F17.200 NICOTINE DEPENDENCE, UNSPECIFIED, UNCOMPLICATED: ICD-10-CM

## 2024-12-17 PROCEDURE — 71250 CT THORAX DX C-: CPT | Mod: 26

## 2024-12-17 PROCEDURE — 71250 CT THORAX DX C-: CPT

## 2024-12-26 DIAGNOSIS — R91.8 OTHER NONSPECIFIC ABNORMAL FINDING OF LUNG FIELD: ICD-10-CM

## 2025-01-14 ENCOUNTER — OUTPATIENT (OUTPATIENT)
Dept: OUTPATIENT SERVICES | Facility: HOSPITAL | Age: 55
LOS: 1 days | End: 2025-01-14
Payer: MEDICAID

## 2025-01-14 ENCOUNTER — APPOINTMENT (OUTPATIENT)
Dept: NUCLEAR MEDICINE | Facility: IMAGING CENTER | Age: 55
End: 2025-01-14

## 2025-01-14 DIAGNOSIS — Z98.890 OTHER SPECIFIED POSTPROCEDURAL STATES: Chronic | ICD-10-CM

## 2025-01-14 DIAGNOSIS — Z90.5 ACQUIRED ABSENCE OF KIDNEY: Chronic | ICD-10-CM

## 2025-01-14 DIAGNOSIS — R91.8 OTHER NONSPECIFIC ABNORMAL FINDING OF LUNG FIELD: ICD-10-CM

## 2025-01-14 DIAGNOSIS — Z00.8 ENCOUNTER FOR OTHER GENERAL EXAMINATION: ICD-10-CM

## 2025-01-14 PROCEDURE — 78815 PET IMAGE W/CT SKULL-THIGH: CPT | Mod: 26,PI

## 2025-01-14 PROCEDURE — A9552: CPT

## 2025-01-14 PROCEDURE — 78815 PET IMAGE W/CT SKULL-THIGH: CPT

## 2025-01-21 DIAGNOSIS — R93.89 ABNORMAL FINDINGS ON DIAGNOSTIC IMAGING OF OTHER SPECIFIED BODY STRUCTURES: ICD-10-CM

## 2025-01-27 PROBLEM — R59.0 MEDIASTINAL LYMPHADENOPATHY: Status: ACTIVE | Noted: 2025-01-27

## 2025-01-28 ENCOUNTER — APPOINTMENT (OUTPATIENT)
Dept: THORACIC SURGERY | Facility: CLINIC | Age: 55
End: 2025-01-28
Payer: MEDICAID

## 2025-01-28 VITALS
BODY MASS INDEX: 33.13 KG/M2 | DIASTOLIC BLOOD PRESSURE: 101 MMHG | RESPIRATION RATE: 17 BRPM | HEART RATE: 86 BPM | HEIGHT: 73 IN | WEIGHT: 250 LBS | OXYGEN SATURATION: 95 % | SYSTOLIC BLOOD PRESSURE: 178 MMHG

## 2025-01-28 DIAGNOSIS — R91.8 OTHER NONSPECIFIC ABNORMAL FINDING OF LUNG FIELD: ICD-10-CM

## 2025-01-28 DIAGNOSIS — R59.0 LOCALIZED ENLARGED LYMPH NODES: ICD-10-CM

## 2025-01-28 PROCEDURE — 99205 OFFICE O/P NEW HI 60 MIN: CPT

## 2025-01-28 RX ORDER — METOPROLOL TARTRATE 75 MG/1
TABLET, FILM COATED ORAL
Refills: 0 | Status: ACTIVE | COMMUNITY

## 2025-02-07 ENCOUNTER — OUTPATIENT (OUTPATIENT)
Dept: OUTPATIENT SERVICES | Facility: HOSPITAL | Age: 55
LOS: 1 days | End: 2025-02-07

## 2025-02-07 VITALS
RESPIRATION RATE: 16 BRPM | SYSTOLIC BLOOD PRESSURE: 162 MMHG | DIASTOLIC BLOOD PRESSURE: 98 MMHG | HEIGHT: 71 IN | WEIGHT: 270.07 LBS | TEMPERATURE: 98 F | HEART RATE: 74 BPM | OXYGEN SATURATION: 97 %

## 2025-02-07 DIAGNOSIS — Z90.5 ACQUIRED ABSENCE OF KIDNEY: Chronic | ICD-10-CM

## 2025-02-07 DIAGNOSIS — Z90.3 ACQUIRED ABSENCE OF STOMACH [PART OF]: Chronic | ICD-10-CM

## 2025-02-07 DIAGNOSIS — R91.8 OTHER NONSPECIFIC ABNORMAL FINDING OF LUNG FIELD: ICD-10-CM

## 2025-02-07 DIAGNOSIS — Z91.89 OTHER SPECIFIED PERSONAL RISK FACTORS, NOT ELSEWHERE CLASSIFIED: ICD-10-CM

## 2025-02-07 DIAGNOSIS — Z98.890 OTHER SPECIFIED POSTPROCEDURAL STATES: Chronic | ICD-10-CM

## 2025-02-07 DIAGNOSIS — I10 ESSENTIAL (PRIMARY) HYPERTENSION: ICD-10-CM

## 2025-02-07 LAB
ANION GAP SERPL CALC-SCNC: 13 MMOL/L — SIGNIFICANT CHANGE UP (ref 7–14)
BASOPHILS # BLD AUTO: 0.04 K/UL — SIGNIFICANT CHANGE UP (ref 0–0.2)
BASOPHILS NFR BLD AUTO: 0.5 % — SIGNIFICANT CHANGE UP (ref 0–2)
BLD GP AB SCN SERPL QL: NEGATIVE — SIGNIFICANT CHANGE UP
BUN SERPL-MCNC: 14 MG/DL — SIGNIFICANT CHANGE UP (ref 7–23)
CALCIUM SERPL-MCNC: 9.3 MG/DL — SIGNIFICANT CHANGE UP (ref 8.4–10.5)
CHLORIDE SERPL-SCNC: 97 MMOL/L — LOW (ref 98–107)
CO2 SERPL-SCNC: 26 MMOL/L — SIGNIFICANT CHANGE UP (ref 22–31)
CREAT SERPL-MCNC: 1.18 MG/DL — SIGNIFICANT CHANGE UP (ref 0.5–1.3)
EGFR: 73 ML/MIN/1.73M2 — SIGNIFICANT CHANGE UP
EOSINOPHIL # BLD AUTO: 0.08 K/UL — SIGNIFICANT CHANGE UP (ref 0–0.5)
EOSINOPHIL NFR BLD AUTO: 1.1 % — SIGNIFICANT CHANGE UP (ref 0–6)
GLUCOSE SERPL-MCNC: 81 MG/DL — SIGNIFICANT CHANGE UP (ref 70–99)
HCT VFR BLD CALC: 51.8 % — HIGH (ref 39–50)
HGB BLD-MCNC: 17.2 G/DL — HIGH (ref 13–17)
IMM GRANULOCYTES NFR BLD AUTO: 0.3 % — SIGNIFICANT CHANGE UP (ref 0–0.9)
LYMPHOCYTES # BLD AUTO: 1.47 K/UL — SIGNIFICANT CHANGE UP (ref 1–3.3)
LYMPHOCYTES # BLD AUTO: 19.6 % — SIGNIFICANT CHANGE UP (ref 13–44)
MCHC RBC-ENTMCNC: 29.4 PG — SIGNIFICANT CHANGE UP (ref 27–34)
MCHC RBC-ENTMCNC: 33.2 G/DL — SIGNIFICANT CHANGE UP (ref 32–36)
MCV RBC AUTO: 88.5 FL — SIGNIFICANT CHANGE UP (ref 80–100)
MONOCYTES # BLD AUTO: 0.52 K/UL — SIGNIFICANT CHANGE UP (ref 0–0.9)
MONOCYTES NFR BLD AUTO: 6.9 % — SIGNIFICANT CHANGE UP (ref 2–14)
NEUTROPHILS # BLD AUTO: 5.36 K/UL — SIGNIFICANT CHANGE UP (ref 1.8–7.4)
NEUTROPHILS NFR BLD AUTO: 71.6 % — SIGNIFICANT CHANGE UP (ref 43–77)
PLATELET # BLD AUTO: 172 K/UL — SIGNIFICANT CHANGE UP (ref 150–400)
POTASSIUM SERPL-MCNC: 4.1 MMOL/L — SIGNIFICANT CHANGE UP (ref 3.5–5.3)
POTASSIUM SERPL-SCNC: 4.1 MMOL/L — SIGNIFICANT CHANGE UP (ref 3.5–5.3)
RBC # BLD: 5.85 M/UL — HIGH (ref 4.2–5.8)
RBC # FLD: 14.1 % — SIGNIFICANT CHANGE UP (ref 10.3–14.5)
RH IG SCN BLD-IMP: POSITIVE — SIGNIFICANT CHANGE UP
SODIUM SERPL-SCNC: 136 MMOL/L — SIGNIFICANT CHANGE UP (ref 135–145)
WBC # BLD: 7.49 K/UL — SIGNIFICANT CHANGE UP (ref 3.8–10.5)
WBC # FLD AUTO: 7.49 K/UL — SIGNIFICANT CHANGE UP (ref 3.8–10.5)

## 2025-02-07 NOTE — H&P PST ADULT - PROBLEM SELECTOR PLAN 1
Patient tentatively scheduled for surgery on: 2/13/25  Provided with verbal and written presurgical instructions  Verbalized understanding  with teach back on the following: Famotidine for GI prophylaxis and chlorhexidine wash    Lab specimen drawn at [facility] today: CBC anemia workup with reflux, Type & Screen,  BMP   EKG, Echo requested    Instructed to use inhalers as prescribed on DOS

## 2025-02-07 NOTE — H&P PST ADULT - NSICDXPASTMEDICALHX_GEN_ALL_CORE_FT
PAST MEDICAL HISTORY:  Cigarette smoker     COPD (chronic obstructive pulmonary disease)     Deviated nasal septum     GERD (gastroesophageal reflux disease)     History of prediabetes     HTN (hypertension)     Malignant neoplasm of unspecified kidney, except renal pelvis     Morbid obesity     Other nonspecific abnormal finding of lung field     Renal cell cancer

## 2025-02-07 NOTE — H&P PST ADULT - NSICDXPASTSURGICALHX_GEN_ALL_CORE_FT
PAST SURGICAL HISTORY:  H/O colonoscopy     H/O partial nephrectomy     S/P gastric sleeve procedure     S/p nephrectomy right 9/3/2021

## 2025-02-07 NOTE — H&P PST ADULT - PROBLEM SELECTOR PLAN 2
Patient instructed to take metoprolol, losartan on day of procedure with small sips of water, verbalized understanding.

## 2025-02-07 NOTE — H&P PST ADULT - NSANTHOSAYNRD_GEN_A_CORE
No. SANDIE screening performed.  STOP BANG Legend: 0-2 = LOW Risk; 3-4 = INTERMEDIATE Risk; 5-8 = HIGH Risk

## 2025-02-07 NOTE — H&P PST ADULT - HISTORY OF PRESENT ILLNESS
54 year old male, current smoker, w/ hx of HTN, COPD, GERD, s/p gastric sleeve (2023 at [facility]), Renal Cell Carcinoma 2021 s/p Left nephrectomy and partial right nephrectomy. Presents with incidental findings of  multiple FDG avid lung nodules and lymphadenopathy. Scheduled for flexible bronchoscopy left video assisted thorascopic surgery, lung resection, possible mediastinal lymph node dissection

## 2025-02-07 NOTE — H&P PST ADULT - MUSCULOSKELETAL
negative ROM intact/no joint swelling/no joint erythema/strength 5/5 bilateral upper extremities/strength 5/5 bilateral lower extremities

## 2025-02-07 NOTE — H&P PST ADULT - COMMENTS
Patient reports normally BP is 130/80's , just taken at cardiologist visit yesterday with normal reading, takes medications as prescribed

## 2025-02-07 NOTE — H&P PST ADULT - NSICDXFAMILYHX_GEN_ALL_CORE_FT
FAMILY HISTORY:  Father  Still living? Unknown  FH: leukemia, Age at diagnosis: Age Unknown    Uncle  Still living? Unknown  FH: prostate cancer, Age at diagnosis: Age Unknown

## 2025-02-12 NOTE — ASU PATIENT PROFILE, ADULT - PROVIDER NOTIFICATION NAME
Patient: Godlen Stover    Procedure(s):  Kidney Transplant Living Related Recipient  - Wound Class: II-Clean Contaminated    Diagnosis: IgA Nephropathy   Diagnosis Additional Information: No value filed.    Anesthesia Type:   General     Note:  Airway :Face Mask  Patient transferred to:PACU        Vitals: (Last set prior to Anesthesia Care Transfer)    CRNA VITALS  6/7/2017 1303 - 6/7/2017 1338      6/7/2017             Pulse: 55    SpO2: 100 %    Resp Rate (set): 10                Electronically Signed By: AMY Chau CRNA  June 7, 2017  1:38 PM   dr Iraheta

## 2025-02-13 ENCOUNTER — APPOINTMENT (OUTPATIENT)
Dept: THORACIC SURGERY | Facility: HOSPITAL | Age: 55
End: 2025-02-13

## 2025-02-13 ENCOUNTER — INPATIENT (INPATIENT)
Facility: HOSPITAL | Age: 55
LOS: 9 days | Discharge: HOME | End: 2025-02-23
Attending: THORACIC SURGERY (CARDIOTHORACIC VASCULAR SURGERY) | Admitting: THORACIC SURGERY (CARDIOTHORACIC VASCULAR SURGERY)
Payer: MEDICAID

## 2025-02-13 ENCOUNTER — TRANSCRIPTION ENCOUNTER (OUTPATIENT)
Age: 55
End: 2025-02-13

## 2025-02-13 VITALS
TEMPERATURE: 98 F | WEIGHT: 270.07 LBS | RESPIRATION RATE: 16 BRPM | OXYGEN SATURATION: 96 % | DIASTOLIC BLOOD PRESSURE: 96 MMHG | SYSTOLIC BLOOD PRESSURE: 136 MMHG | HEIGHT: 71 IN | HEART RATE: 72 BPM

## 2025-02-13 DIAGNOSIS — Z90.3 ACQUIRED ABSENCE OF STOMACH [PART OF]: Chronic | ICD-10-CM

## 2025-02-13 DIAGNOSIS — Z90.5 ACQUIRED ABSENCE OF KIDNEY: Chronic | ICD-10-CM

## 2025-02-13 DIAGNOSIS — R91.8 OTHER NONSPECIFIC ABNORMAL FINDING OF LUNG FIELD: ICD-10-CM

## 2025-02-13 DIAGNOSIS — Z98.890 OTHER SPECIFIED POSTPROCEDURAL STATES: Chronic | ICD-10-CM

## 2025-02-13 LAB
GRAM STN FLD: SIGNIFICANT CHANGE UP
NIGHT BLUE STAIN TISS: ABNORMAL
SPECIMEN SOURCE: SIGNIFICANT CHANGE UP
SPECIMEN SOURCE: SIGNIFICANT CHANGE UP

## 2025-02-13 PROCEDURE — 32608 THORACOSCOPY W/BX NODULE: CPT | Mod: LT

## 2025-02-13 PROCEDURE — 88332 PATH CONSLTJ SURG EA ADD BLK: CPT | Mod: 26

## 2025-02-13 PROCEDURE — 99233 SBSQ HOSP IP/OBS HIGH 50: CPT

## 2025-02-13 PROCEDURE — 88307 TISSUE EXAM BY PATHOLOGIST: CPT | Mod: 26

## 2025-02-13 PROCEDURE — 88312 SPECIAL STAINS GROUP 1: CPT | Mod: 26

## 2025-02-13 PROCEDURE — 88331 PATH CONSLTJ SURG 1 BLK 1SPC: CPT | Mod: 26

## 2025-02-13 PROCEDURE — 71045 X-RAY EXAM CHEST 1 VIEW: CPT | Mod: 26

## 2025-02-13 PROCEDURE — 32674 THORACOSCOPY LYMPH NODE EXC: CPT

## 2025-02-13 PROCEDURE — 88305 TISSUE EXAM BY PATHOLOGIST: CPT | Mod: 26

## 2025-02-13 DEVICE — CHEST DRAIN THORACIC ARGYLE PVC 20FR STRAIGHT: Type: IMPLANTABLE DEVICE | Site: LEFT | Status: FUNCTIONAL

## 2025-02-13 DEVICE — STAPLER ETHICON ECHELON ENDOPATH GRIP SURFACE 45MM BLACK RELOAD: Type: IMPLANTABLE DEVICE | Site: LEFT | Status: FUNCTIONAL

## 2025-02-13 DEVICE — STAPLER ETHICON GST ECHELON 45MM GREEN RELOAD: Type: IMPLANTABLE DEVICE | Site: LEFT | Status: FUNCTIONAL

## 2025-02-13 RX ORDER — GABAPENTIN 400 MG/1
300 CAPSULE ORAL ONCE
Refills: 0 | Status: COMPLETED | OUTPATIENT
Start: 2025-02-13 | End: 2025-02-13

## 2025-02-13 RX ORDER — METOPROLOL SUCCINATE 50 MG/1
12.5 TABLET, EXTENDED RELEASE ORAL EVERY 12 HOURS
Refills: 0 | Status: DISCONTINUED | OUTPATIENT
Start: 2025-02-13 | End: 2025-02-15

## 2025-02-13 RX ORDER — HEPARIN SODIUM 1000 [USP'U]/ML
7500 INJECTION INTRAVENOUS; SUBCUTANEOUS ONCE
Refills: 0 | Status: COMPLETED | OUTPATIENT
Start: 2025-02-13 | End: 2025-02-13

## 2025-02-13 RX ORDER — HEPARIN SODIUM 1000 [USP'U]/ML
7500 INJECTION INTRAVENOUS; SUBCUTANEOUS EVERY 8 HOURS
Refills: 0 | Status: DISCONTINUED | OUTPATIENT
Start: 2025-02-13 | End: 2025-02-23

## 2025-02-13 RX ORDER — NALOXONE HYDROCHLORIDE 0.4 MG/ML
0.1 INJECTION, SOLUTION INTRAMUSCULAR; INTRAVENOUS; SUBCUTANEOUS
Refills: 0 | Status: DISCONTINUED | OUTPATIENT
Start: 2025-02-13 | End: 2025-02-23

## 2025-02-13 RX ORDER — ONDANSETRON HCL/PF 4 MG/2 ML
4 VIAL (ML) INJECTION ONCE
Refills: 0 | Status: COMPLETED | OUTPATIENT
Start: 2025-02-13 | End: 2025-02-13

## 2025-02-13 RX ORDER — METOPROLOL SUCCINATE 25 MG
25 TABLET, EXTENDED RELEASE 24 HR ORAL
Refills: 0 | DISCHARGE

## 2025-02-13 RX ORDER — HYDROMORPHONE/SOD CHLOR,ISO/PF 2 MG/10 ML
0.5 SYRINGE (ML) INJECTION
Refills: 0 | Status: DISCONTINUED | OUTPATIENT
Start: 2025-02-13 | End: 2025-02-14

## 2025-02-13 RX ORDER — ONDANSETRON HCL/PF 4 MG/2 ML
4 VIAL (ML) INJECTION EVERY 6 HOURS
Refills: 0 | Status: DISCONTINUED | OUTPATIENT
Start: 2025-02-13 | End: 2025-02-23

## 2025-02-13 RX ORDER — LOSARTAN POTASSIUM 100 MG
1 TABLET ORAL
Refills: 0 | DISCHARGE

## 2025-02-13 RX ORDER — HYDROMORPHONE/SOD CHLOR,ISO/PF 2 MG/10 ML
30 SYRINGE (ML) INJECTION
Refills: 0 | Status: DISCONTINUED | OUTPATIENT
Start: 2025-02-13 | End: 2025-02-14

## 2025-02-13 RX ORDER — POLYETHYLENE GLYCOL 3350 17 G/17G
17 POWDER, FOR SOLUTION ORAL DAILY
Refills: 0 | Status: DISCONTINUED | OUTPATIENT
Start: 2025-02-13 | End: 2025-02-23

## 2025-02-13 RX ORDER — SODIUM CHLORIDE 9 G/1000ML
1000 INJECTION, SOLUTION INTRAVENOUS
Refills: 0 | Status: DISCONTINUED | OUTPATIENT
Start: 2025-02-13 | End: 2025-02-14

## 2025-02-13 RX ORDER — TIOTROPIUM BROMIDE INHALATION SPRAY 3.12 UG/1
2 SPRAY, METERED RESPIRATORY (INHALATION) DAILY
Refills: 0 | Status: DISCONTINUED | OUTPATIENT
Start: 2025-02-13 | End: 2025-02-16

## 2025-02-13 RX ORDER — INFLUENZA A VIRUS A/IDAHO/07/2018 (H1N1) ANTIGEN (MDCK CELL DERIVED, PROPIOLACTONE INACTIVATED, INFLUENZA A VIRUS A/INDIANA/08/2018 (H3N2) ANTIGEN (MDCK CELL DERIVED, PROPIOLACTONE INACTIVATED), INFLUENZA B VIRUS B/SINGAPORE/INFTT-16-0610/2016 ANTIGEN (MDCK CELL DERIVED, PROPIOLACTONE INACTIVATED), INFLUENZA B VIRUS B/IOWA/06/2017 ANTIGEN (MDCK CELL DERIVED, PROPIOLACTONE INACTIVATED) 15; 15; 15; 15 UG/.5ML; UG/.5ML; UG/.5ML; UG/.5ML
0.5 INJECTION, SUSPENSION INTRAMUSCULAR ONCE
Refills: 0 | Status: COMPLETED | OUTPATIENT
Start: 2025-02-13 | End: 2025-02-13

## 2025-02-13 RX ORDER — METOCLOPRAMIDE HCL 10 MG
10 TABLET ORAL ONCE
Refills: 0 | Status: COMPLETED | OUTPATIENT
Start: 2025-02-13 | End: 2025-02-13

## 2025-02-13 RX ORDER — ACETAMINOPHEN 500 MG/5ML
975 LIQUID (ML) ORAL ONCE
Refills: 0 | Status: COMPLETED | OUTPATIENT
Start: 2025-02-13 | End: 2025-02-13

## 2025-02-13 RX ORDER — SODIUM CHLORIDE 9 G/1000ML
1000 INJECTION, SOLUTION INTRAVENOUS
Refills: 0 | Status: DISCONTINUED | OUTPATIENT
Start: 2025-02-13 | End: 2025-02-13

## 2025-02-13 RX ORDER — TADALAFIL 10 MG/1
1 TABLET, FILM COATED ORAL
Refills: 0 | DISCHARGE

## 2025-02-13 RX ORDER — SENNA 187 MG
2 TABLET ORAL AT BEDTIME
Refills: 0 | Status: DISCONTINUED | OUTPATIENT
Start: 2025-02-13 | End: 2025-02-23

## 2025-02-13 RX ORDER — ACETAMINOPHEN 500 MG/5ML
1000 LIQUID (ML) ORAL EVERY 6 HOURS
Refills: 0 | Status: COMPLETED | OUTPATIENT
Start: 2025-02-13 | End: 2025-02-14

## 2025-02-13 RX ORDER — FLUTICASONE FUROATE, UMECLIDINIUM BROMIDE AND VILANTEROL TRIFENATATE 200; 62.5; 25 UG/1; UG/1; UG/1
1 POWDER RESPIRATORY (INHALATION)
Refills: 0 | DISCHARGE

## 2025-02-13 RX ADMIN — SODIUM CHLORIDE 30 MILLILITER(S): 9 INJECTION, SOLUTION INTRAVENOUS at 11:46

## 2025-02-13 RX ADMIN — Medication 10 MILLIGRAM(S): at 09:50

## 2025-02-13 RX ADMIN — SODIUM CHLORIDE 30 MILLILITER(S): 9 INJECTION, SOLUTION INTRAVENOUS at 06:57

## 2025-02-13 RX ADMIN — Medication 30 MILLILITER(S): at 19:51

## 2025-02-13 RX ADMIN — HEPARIN SODIUM 7500 UNIT(S): 1000 INJECTION INTRAVENOUS; SUBCUTANEOUS at 14:27

## 2025-02-13 RX ADMIN — Medication 400 MILLIGRAM(S): at 11:43

## 2025-02-13 RX ADMIN — Medication 1 APPLICATION(S): at 11:45

## 2025-02-13 RX ADMIN — Medication 10 MILLIGRAM(S): at 18:37

## 2025-02-13 RX ADMIN — Medication 975 MILLIGRAM(S): at 06:56

## 2025-02-13 RX ADMIN — HEPARIN SODIUM 7500 UNIT(S): 1000 INJECTION INTRAVENOUS; SUBCUTANEOUS at 06:58

## 2025-02-13 RX ADMIN — METOPROLOL SUCCINATE 12.5 MILLIGRAM(S): 50 TABLET, EXTENDED RELEASE ORAL at 17:08

## 2025-02-13 RX ADMIN — Medication 30 MILLILITER(S): at 11:46

## 2025-02-13 RX ADMIN — GABAPENTIN 300 MILLIGRAM(S): 400 CAPSULE ORAL at 06:56

## 2025-02-13 RX ADMIN — SODIUM CHLORIDE 30 MILLILITER(S): 9 INJECTION, SOLUTION INTRAVENOUS at 19:49

## 2025-02-13 RX ADMIN — Medication 2 TABLET(S): at 21:59

## 2025-02-13 RX ADMIN — Medication 400 MILLIGRAM(S): at 17:46

## 2025-02-13 RX ADMIN — HEPARIN SODIUM 7500 UNIT(S): 1000 INJECTION INTRAVENOUS; SUBCUTANEOUS at 21:58

## 2025-02-13 RX ADMIN — Medication 4 MILLIGRAM(S): at 17:07

## 2025-02-13 RX ADMIN — Medication 40 MILLIGRAM(S): at 21:58

## 2025-02-13 NOTE — PROGRESS NOTE ADULT - SUBJECTIVE AND OBJECTIVE BOX
YAYA MARIA      54y   Male   MRN-[id number]         No Known Allergies             Daily Height in cm: 180.34 (13 Feb 2025 06:06)    Daily Drug Dosing Weight  Height (cm): 180.3 (13 Feb 2025 06:06)  Weight (kg): 122.5 (13 Feb 2025 06:06)  BMI (kg/m2): 37.7 (13 Feb 2025 06:06)  BSA (m2): 2.4 (13 Feb 2025 06:06)    HPI:  54 year old male, current smoker, w/ hx of HTN, COPD, GERD, s/p gastric sleeve (2023 at [facility]), Renal Cell Carcinoma 2021 s/p Left nephrectomy and partial right nephrectomy. Presents with incidental findings of  multiple FDG avid lung nodules and lymphadenopathy. Scheduled for flexible bronchoscopy left video assisted thorascopic surgery, lung resection, possible mediastinal lymph node dissection  (07 Feb 2025 08:44)      CHIEF COMPLAINT: Follow up in ICU  for postoperative care of patient who is s/p lung surgery    Procedure: Flexible bronchoscopy, Left uniportal VATS, Left upper lobe wedge resection and mediastinal lymph node biopsy  2/13/25                     Issues:              Lung nodule              Postop pain              Chest tube in place              At risk for respiratory complications              At risk for PCA infusion related complications  Morbid obesity  Cigarette smoker  HTN (hypertension)  GERD (gastroesophageal reflux disease)  History of prediabetes  Deviated nasal septum  Malignant neoplasm of unspecified kidney, except renal pelvis  Renal cell cancer s/p S/p nephrectomy right 9/3/2021  COPD (chronic obstructive pulmonary disease)  H/O partial nephrectomy - L  S/P gastric sleeve procedure      Postop course:     Patient reports moderate pain at chest wall incision sites which is worse with coughing and deep breathing without associated fever or dyspnea. Pain is improved with use of PCA and  oral pain meds.         Home Medications:  losartan 50 mg oral tablet: 1 tab(s) orally once a day AM (13 Feb 2025 06:22)  metoprolol: 25 milligram(s) orally once a day (13 Feb 2025 06:22)  tadalafil 20 mg oral tablet: 1 tab(s) orally once a day (13 Feb 2025 06:22)  Trelegy Ellipta 200 mcg-62.5 mcg-25 mcg/inh inhalation powder: 1 puff(s) inhaled once a day (13 Feb 2025 06:22)    PAST MEDICAL & SURGICAL HISTORY:  Morbid obesity      Cigarette smoker      HTN (hypertension)      GERD (gastroesophageal reflux disease)      History of prediabetes      Deviated nasal septum      Malignant neoplasm of unspecified kidney, except renal pelvis      Renal cell cancer      Other nonspecific abnormal finding of lung field      COPD (chronic obstructive pulmonary disease)      H/O colonoscopy      S/p nephrectomy  right 9/3/2021      H/O partial nephrectomy      S/P gastric sleeve procedure        Vital Signs Last 24 Hrs  T(C): 36.4 (13 Feb 2025 09:40), Max: 36.4 (13 Feb 2025 06:06)  T(F): 97.6 (13 Feb 2025 09:40), Max: 97.6 (13 Feb 2025 09:40)  HR: 83 (13 Feb 2025 10:55) (72 - 85)  BP: 152/89 (13 Feb 2025 10:55) (136/96 - 187/101)  BP(mean): 108 (13 Feb 2025 10:55) (108 - 126)  RR: 16 (13 Feb 2025 10:55) (16 - 26)  SpO2: 97% (13 Feb 2025 10:55) (94% - 98%)    Parameters below as of 13 Feb 2025 10:55  Patient On (Oxygen Delivery Method): nasal cannula w/ humidification  O2 Flow (L/min): 2    I&O's Detail    13 Feb 2025 07:01  -  13 Feb 2025 11:43  --------------------------------------------------------  IN:    Lactated Ringers: 60 mL  Total IN: 60 mL    OUT:    Chest Tube (mL): 10 mL  Total OUT: 10 mL    Total NET: 50 mL        CAPILLARY BLOOD GLUCOSE        Home Medications:  losartan 50 mg oral tablet: 1 tab(s) orally once a day AM (13 Feb 2025 06:22)  metoprolol: 25 milligram(s) orally once a day (13 Feb 2025 06:22)  tadalafil 20 mg oral tablet: 1 tab(s) orally once a day (13 Feb 2025 06:22)  Trelegy Ellipta 200 mcg-62.5 mcg-25 mcg/inh inhalation powder: 1 puff(s) inhaled once a day (13 Feb 2025 06:22)    MEDICATIONS  (STANDING):  acetaminophen   IVPB .. 1000 milliGRAM(s) IV Intermittent every 6 hours  chlorhexidine 2% Cloths 1 Application(s) Topical daily  fluticasone propionate/ salmeterol 100-50 MICROgram(s) Diskus 1 Dose(s) Inhalation two times a day  heparin   Injectable 7500 Unit(s) SubCutaneous every 8 hours  HYDROmorphone PCA (1 mG/mL) 30 milliLiter(s) PCA Continuous PCA Continuous  influenza   Vaccine 0.5 milliLiter(s) IntraMuscular once  lactated ringers. 1000 milliLiter(s) (30 mL/Hr) IV Continuous <Continuous>  polyethylene glycol 3350 17 Gram(s) Oral daily  senna 2 Tablet(s) Oral at bedtime  tiotropium 2.5 MICROgram(s) Inhaler 2 Puff(s) Inhalation daily    MEDICATIONS  (PRN):  HYDROmorphone  Injectable 0.5 milliGRAM(s) IV Push every 10 minutes PRN Moderate Pain (4 - 6)  HYDROmorphone PCA (1 mG/mL) Rescue Clinician Bolus 0.5 milliGRAM(s) IV Push every 15 minutes PRN for Pain Scale GREATER THAN 6  metoclopramide Injectable 10 milliGRAM(s) IV Push once PRN Nausea and/or Vomiting  naloxone Injectable 0.1 milliGRAM(s) IV Push every 3 minutes PRN For ANY of the following changes in patient status:  A. RR LESS THAN 10 breaths per minute, B. Oxygen saturation LESS THAN 90%, C. Sedation score of 6  ondansetron Injectable 4 milliGRAM(s) IV Push every 6 hours PRN Nausea  ondansetron Injectable 4 milliGRAM(s) IV Push once PRN Nausea and/or Vomiting      Physical exam:                             General:               Pt is awake, alert,  appears to be in pain but not in distress                                                  Neuro:                  Nonfocal                             Psych:                   A&Ox3                          Cardiovascular:   S1 & S2, regular                           Respiratory:         Air entry is fair and equal on both sides, has bilateral conducted sounds                           GI:                          Soft, nondistended and nontender, Bowel sounds active                            Ext:                        No cyanosis or edema     Labs:                                                                 CXR:  Postop changes, left chest tube in place    Plan:  General: 54yMale s/p Flexible bronchoscopy, Left uniportal VATS, Left upper lobe wedge resection and mediastinal lymph node biopsy  2/13/25   , experiencing  pain with deep breathing.                             Neuro:                                         Pain control with Oxycodone / PCA /  Tylenol PRN / Lidopatch                            Cardiovascular:                                          Telemetry (medical test) - Reviewed by me today independently. Pt is in normal sinus rhythm .                                          HTN: Continue hemodynamic monitoring and restart Lopressor / Losartan                                         Continue hemodynamic monitoring to prevent decompensation.                            Respiratory:                                         Postop hypoxemia requiring O2 via nasal cannula probably due to postop pain - Wean nasal cannula for goal O2sat above 92%.                                             CXR is clear. Encourage incentive spirometry.                                                   Chest PT and frequent suctioning.                                          Necrotizing granuloma - In isolation, obtain sputum x 3 for AFB     COPD: Continue bronchodilators, inhaled steroids, Pulmozyme and inhaled 3% saline inhalations.                                         OOB to chair & ambulate w/ assistance.                                          Continuous pulse oximetry for support & to prevent decompensation.                                         Monitor chest tube output                                         Chest tube to water seal                                                                                            GI                                         On puree diet, advance to  regular diet as tolerated                                         Continue G.I prophylaxis with Protonix                                         Continue Zofran / Reglan for nausea - PRN                                         Continue bowel regimen	                                                                 Renal:     RCC s/p Right nephrectomy and partial L-nephrectomy     Avoid hypotension and nephrotoxic agents     OK to have SBP in 120-160s                                         Continue LR  30cc/hr                                         Monitor I/Os and electrolytes                                                                                        Hem/ Onc:                                         DVT prophylaxis with SQ Heparin and SCDs                                         Monitor chest tube output &  signs of bleeding.                                          Follow CBC in AM                           Infectious disease:                                            Monitor for fever / leukocytosis.                                          All surgical incision / chest tube  sites look clean                            Endocrine                                             Prediabetes: Continue Accu-Checks with coverage                                                Pertinent clinical, laboratory, radiographic, hemodynamic, echocardiographic, respiratory data, microbiologic data and chart were reviewed and analyzed frequently throughout the course of the day and night. Patient seen, examined and plan discussed with CT Surgeon   / CTICU team during rounds.  This individual is in need of critical care monitoring with impairment of one or more vital organ system and is at high risk for life-threatening hemodynamic compromise. Failure to initiate the recommended interventions on an urgent basis incurs considerable risk of sudden, clinically significant, or life-threatening deterioration in the patient's condition.    OOB to chair and ambulate with physical therapy as tolerated.   Status discussed with patient and updated plan of care.     I have spent 50 minutes with this patient  monitoring hemodynamic status, respiratory status and  coordinating care in the ICU.  I have spent  70 / 120   minutes of critical care time with this pt between  7am and 11.59pm monitoring hemodynamic status, managing fluid resuscitation /  pressors to prevent / worsening of shock and ventilator management. Critical care time does not include procedures.        Dave Coleman MD

## 2025-02-13 NOTE — BRIEF OPERATIVE NOTE - OPERATION/FINDINGS
Flexible bronchoscopy  Left uniportal VATS  Left upper lobe wedge resection and mediastinal lymph node biopsy of level 5 lymph node

## 2025-02-13 NOTE — PATIENT PROFILE ADULT - FALL HARM RISK - RISK INTERVENTIONS
Assistance OOB with selected safe patient handling equipment/Assistance with ambulation/Communicate Fall Risk and Risk Factors to all staff, patient, and family/Monitor gait and stability/Reinforce activity limits and safety measures with patient and family/Sit up slowly, dangle for a short time, stand at bedside before walking/Use of alarms - bed, chair and/or voice tab/Visual Cue: Yellow wristband/Bed in lowest position, wheels locked, appropriate side rails in place/Call bell, personal items and telephone in reach/Instruct patient to call for assistance before getting out of bed or chair/Non-slip footwear when patient is out of bed/[place] to call system/Physically safe environment - no spills, clutter or unnecessary equipment/Purposeful Proactive Rounding/Room/bathroom lighting operational, light cord in reach

## 2025-02-13 NOTE — BRIEF OPERATIVE NOTE - NSICDXBRIEFPROCEDURE_GEN_ALL_CORE_FT
PROCEDURES:  Bronchoscopy, with lung wedge resection using VATS 13-Feb-2025 09:55:12  Radha Valencia

## 2025-02-14 LAB
ALBUMIN SERPL ELPH-MCNC: 3.3 G/DL — SIGNIFICANT CHANGE UP (ref 3.3–5)
ALP SERPL-CCNC: 68 U/L — SIGNIFICANT CHANGE UP (ref 40–120)
ALT FLD-CCNC: 9 U/L — SIGNIFICANT CHANGE UP (ref 4–41)
ANION GAP SERPL CALC-SCNC: 13 MMOL/L — SIGNIFICANT CHANGE UP (ref 7–14)
AST SERPL-CCNC: 9 U/L — SIGNIFICANT CHANGE UP (ref 4–40)
BILIRUB SERPL-MCNC: 0.4 MG/DL — SIGNIFICANT CHANGE UP (ref 0.2–1.2)
BUN SERPL-MCNC: 17 MG/DL — SIGNIFICANT CHANGE UP (ref 7–23)
CALCIUM SERPL-MCNC: 8.5 MG/DL — SIGNIFICANT CHANGE UP (ref 8.4–10.5)
CHLORIDE SERPL-SCNC: 99 MMOL/L — SIGNIFICANT CHANGE UP (ref 98–107)
CO2 SERPL-SCNC: 24 MMOL/L — SIGNIFICANT CHANGE UP (ref 22–31)
CREAT SERPL-MCNC: 1.07 MG/DL — SIGNIFICANT CHANGE UP (ref 0.5–1.3)
EGFR: 82 ML/MIN/1.73M2 — SIGNIFICANT CHANGE UP
GLUCOSE SERPL-MCNC: 111 MG/DL — HIGH (ref 70–99)
HCT VFR BLD CALC: 50.6 % — HIGH (ref 39–50)
HGB BLD-MCNC: 15.9 G/DL — SIGNIFICANT CHANGE UP (ref 13–17)
MAGNESIUM SERPL-MCNC: 2 MG/DL — SIGNIFICANT CHANGE UP (ref 1.6–2.6)
MCHC RBC-ENTMCNC: 29.2 PG — SIGNIFICANT CHANGE UP (ref 27–34)
MCHC RBC-ENTMCNC: 31.4 G/DL — LOW (ref 32–36)
MCV RBC AUTO: 93 FL — SIGNIFICANT CHANGE UP (ref 80–100)
MRSA PCR RESULT.: SIGNIFICANT CHANGE UP
NIGHT BLUE STAIN TISS: ABNORMAL
NIGHT BLUE STAIN TISS: SIGNIFICANT CHANGE UP
NIGHT BLUE STAIN TISS: SIGNIFICANT CHANGE UP
NRBC # BLD AUTO: 0 K/UL — SIGNIFICANT CHANGE UP (ref 0–0)
NRBC # FLD: 0 K/UL — SIGNIFICANT CHANGE UP (ref 0–0)
NRBC BLD AUTO-RTO: 0 /100 WBCS — SIGNIFICANT CHANGE UP (ref 0–0)
PHOSPHATE SERPL-MCNC: 3.1 MG/DL — SIGNIFICANT CHANGE UP (ref 2.5–4.5)
PLATELET # BLD AUTO: 181 K/UL — SIGNIFICANT CHANGE UP (ref 150–400)
POTASSIUM SERPL-MCNC: 4.6 MMOL/L — SIGNIFICANT CHANGE UP (ref 3.5–5.3)
POTASSIUM SERPL-SCNC: 4.6 MMOL/L — SIGNIFICANT CHANGE UP (ref 3.5–5.3)
PROT SERPL-MCNC: 4.9 G/DL — LOW (ref 6–8.3)
RBC # BLD: 5.44 M/UL — SIGNIFICANT CHANGE UP (ref 4.2–5.8)
RBC # FLD: 14.2 % — SIGNIFICANT CHANGE UP (ref 10.3–14.5)
S AUREUS DNA NOSE QL NAA+PROBE: SIGNIFICANT CHANGE UP
SODIUM SERPL-SCNC: 136 MMOL/L — SIGNIFICANT CHANGE UP (ref 135–145)
SPECIMEN SOURCE: SIGNIFICANT CHANGE UP
WBC # BLD: 11.69 K/UL — HIGH (ref 3.8–10.5)
WBC # FLD AUTO: 11.69 K/UL — HIGH (ref 3.8–10.5)

## 2025-02-14 PROCEDURE — 99223 1ST HOSP IP/OBS HIGH 75: CPT | Mod: GC

## 2025-02-14 PROCEDURE — G0545: CPT

## 2025-02-14 PROCEDURE — 71045 X-RAY EXAM CHEST 1 VIEW: CPT | Mod: 26

## 2025-02-14 PROCEDURE — 99233 SBSQ HOSP IP/OBS HIGH 50: CPT

## 2025-02-14 RX ORDER — LOSARTAN POTASSIUM 100 MG/1
25 TABLET, FILM COATED ORAL ONCE
Refills: 0 | Status: COMPLETED | OUTPATIENT
Start: 2025-02-14 | End: 2025-02-14

## 2025-02-14 RX ORDER — LOSARTAN POTASSIUM 100 MG/1
50 TABLET, FILM COATED ORAL DAILY
Refills: 0 | Status: DISCONTINUED | OUTPATIENT
Start: 2025-02-15 | End: 2025-02-23

## 2025-02-14 RX ORDER — OXYCODONE HYDROCHLORIDE 30 MG/1
10 TABLET ORAL
Refills: 0 | Status: DISCONTINUED | OUTPATIENT
Start: 2025-02-14 | End: 2025-02-21

## 2025-02-14 RX ORDER — ALPRAZOLAM 0.5 MG
0.5 TABLET, EXTENDED RELEASE 24 HR ORAL EVERY 8 HOURS
Refills: 0 | Status: DISCONTINUED | OUTPATIENT
Start: 2025-02-14 | End: 2025-02-14

## 2025-02-14 RX ORDER — ACETAMINOPHEN 500 MG/5ML
1000 LIQUID (ML) ORAL ONCE
Refills: 0 | Status: DISCONTINUED | OUTPATIENT
Start: 2025-02-14 | End: 2025-02-16

## 2025-02-14 RX ORDER — ACETAMINOPHEN 500 MG/5ML
1000 LIQUID (ML) ORAL ONCE
Refills: 0 | Status: COMPLETED | OUTPATIENT
Start: 2025-02-14 | End: 2025-02-14

## 2025-02-14 RX ORDER — LIDOCAINE HYDROCHLORIDE 20 MG/ML
1 JELLY TOPICAL EVERY 24 HOURS
Refills: 0 | Status: DISCONTINUED | OUTPATIENT
Start: 2025-02-14 | End: 2025-02-23

## 2025-02-14 RX ORDER — HYDROMORPHONE/SOD CHLOR,ISO/PF 2 MG/10 ML
0.5 SYRINGE (ML) INJECTION EVERY 4 HOURS
Refills: 0 | Status: DISCONTINUED | OUTPATIENT
Start: 2025-02-14 | End: 2025-02-16

## 2025-02-14 RX ORDER — OXYCODONE HYDROCHLORIDE 30 MG/1
5 TABLET ORAL
Refills: 0 | Status: DISCONTINUED | OUTPATIENT
Start: 2025-02-14 | End: 2025-02-14

## 2025-02-14 RX ORDER — LABETALOL HYDROCHLORIDE 200 MG/1
5 TABLET, FILM COATED ORAL ONCE
Refills: 0 | Status: COMPLETED | OUTPATIENT
Start: 2025-02-14 | End: 2025-02-14

## 2025-02-14 RX ORDER — LOSARTAN POTASSIUM 100 MG/1
25 TABLET, FILM COATED ORAL DAILY
Refills: 0 | Status: DISCONTINUED | OUTPATIENT
Start: 2025-02-14 | End: 2025-02-14

## 2025-02-14 RX ADMIN — OXYCODONE HYDROCHLORIDE 10 MILLIGRAM(S): 30 TABLET ORAL at 15:20

## 2025-02-14 RX ADMIN — LOSARTAN POTASSIUM 25 MILLIGRAM(S): 100 TABLET, FILM COATED ORAL at 12:10

## 2025-02-14 RX ADMIN — Medication 30 MILLILITER(S): at 08:50

## 2025-02-14 RX ADMIN — METOPROLOL SUCCINATE 12.5 MILLIGRAM(S): 50 TABLET, EXTENDED RELEASE ORAL at 17:39

## 2025-02-14 RX ADMIN — Medication 400 MILLIGRAM(S): at 15:20

## 2025-02-14 RX ADMIN — LABETALOL HYDROCHLORIDE 5 MILLIGRAM(S): 200 TABLET, FILM COATED ORAL at 15:10

## 2025-02-14 RX ADMIN — Medication 2 TABLET(S): at 21:25

## 2025-02-14 RX ADMIN — HEPARIN SODIUM 7500 UNIT(S): 1000 INJECTION INTRAVENOUS; SUBCUTANEOUS at 13:33

## 2025-02-14 RX ADMIN — LOSARTAN POTASSIUM 25 MILLIGRAM(S): 100 TABLET, FILM COATED ORAL at 08:49

## 2025-02-14 RX ADMIN — Medication 5 MILLIGRAM(S): at 12:10

## 2025-02-14 RX ADMIN — OXYCODONE HYDROCHLORIDE 10 MILLIGRAM(S): 30 TABLET ORAL at 17:38

## 2025-02-14 RX ADMIN — METOPROLOL SUCCINATE 12.5 MILLIGRAM(S): 50 TABLET, EXTENDED RELEASE ORAL at 05:21

## 2025-02-14 RX ADMIN — Medication 1 DOSE(S): at 21:11

## 2025-02-14 RX ADMIN — OXYCODONE HYDROCHLORIDE 10 MILLIGRAM(S): 30 TABLET ORAL at 22:26

## 2025-02-14 RX ADMIN — LIDOCAINE HYDROCHLORIDE 1 PATCH: 20 JELLY TOPICAL at 20:02

## 2025-02-14 RX ADMIN — OXYCODONE HYDROCHLORIDE 10 MILLIGRAM(S): 30 TABLET ORAL at 18:20

## 2025-02-14 RX ADMIN — Medication 1 APPLICATION(S): at 13:19

## 2025-02-14 RX ADMIN — Medication 0.5 MILLIGRAM(S): at 16:48

## 2025-02-14 RX ADMIN — Medication 400 MILLIGRAM(S): at 06:27

## 2025-02-14 RX ADMIN — Medication 400 MILLIGRAM(S): at 00:30

## 2025-02-14 RX ADMIN — HEPARIN SODIUM 7500 UNIT(S): 1000 INJECTION INTRAVENOUS; SUBCUTANEOUS at 05:22

## 2025-02-14 RX ADMIN — Medication 10 MILLIGRAM(S): at 13:19

## 2025-02-14 RX ADMIN — LIDOCAINE HYDROCHLORIDE 1 PATCH: 20 JELLY TOPICAL at 17:40

## 2025-02-14 RX ADMIN — OXYCODONE HYDROCHLORIDE 10 MILLIGRAM(S): 30 TABLET ORAL at 14:31

## 2025-02-14 RX ADMIN — HEPARIN SODIUM 7500 UNIT(S): 1000 INJECTION INTRAVENOUS; SUBCUTANEOUS at 21:26

## 2025-02-14 RX ADMIN — OXYCODONE HYDROCHLORIDE 10 MILLIGRAM(S): 30 TABLET ORAL at 23:15

## 2025-02-14 RX ADMIN — Medication 40 MILLIGRAM(S): at 13:19

## 2025-02-14 RX ADMIN — Medication 1000 MILLIGRAM(S): at 16:20

## 2025-02-14 NOTE — PROGRESS NOTE ADULT - SUBJECTIVE AND OBJECTIVE BOX
POST ANESTHESIA EVALUATION    54y Male POSTOP DAY 1 S/P     MENTAL STATUS: Patient participation [X] Awake     [  ] Arousable     [  ] Sedated    AIRWAY PATENCY: [X] Satisfactory  [  ] Other:     Vital Signs Last 24 Hrs  T(C): 36.6 (14 Feb 2025 00:00), Max: 36.7 (13 Feb 2025 12:00)  T(F): 97.8 (14 Feb 2025 00:00), Max: 98 (13 Feb 2025 12:00)  HR: 86 (14 Feb 2025 10:00) (62 - 99)  BP: 118/89 (14 Feb 2025 10:00) (118/89 - 165/109)  BP(mean): 98 (14 Feb 2025 10:00) (96 - 129)  RR: 15 (14 Feb 2025 10:00) (11 - 23)  SpO2: 92% (14 Feb 2025 10:00) (92% - 98%)    Parameters below as of 14 Feb 2025 07:00  Patient On (Oxygen Delivery Method): room air      I&O's Summary    13 Feb 2025 07:01  -  14 Feb 2025 07:00  --------------------------------------------------------  IN: 860 mL / OUT: 985 mL / NET: -125 mL    14 Feb 2025 07:01  -  14 Feb 2025 10:54  --------------------------------------------------------  IN: 60 mL / OUT: 130 mL / NET: -70 mL          NAUSEA/ VOMITTING:  [X] NONE  [  ] CONTROLLED [  ] OTHER     PAIN: [X] CONTROLLED WITH CURRENT REGIMEN  [  ] OTHER    [X] NO APPARENT ANESTHESIA COMPLICATIONS      Comments:

## 2025-02-14 NOTE — CONSULT NOTE ADULT - TIME BILLING
- Chart review  - Independent review and interpretation of data  - Bedside physical examination, patient assessment, and evaluation.  - Discussion with patient and family  - Medical decision making  - Discussion with other providers  - Care coordination

## 2025-02-14 NOTE — CONSULT NOTE ADULT - ASSESSMENT
Patient is a 54 year old male w/ hx of HTN, COPD, GERD, s/p gastric sleeve (2023 at [facility]), Renal Cell Carcinoma 2021 s/p Left nephrectomy and partial right nephrectomy, current smoker, who presents for scheduled surgical intervention for abnormal lung imaging findings including lung nodules. Patient s/p bronchoscopy with left upper lobe wedge resection and mediastinal lymph node biopsy on 2/13 with chest tube placed intraop.     ID called for few acid fast bacillus on tissue culture as well as sputum culture with few acid fast bacillus. MTB PCR in process. As per primary team, patient found to have necrotizing granuloma intra op. Surgical path pending.     Patient without risk factors, epidemiologic hx, or active sx concerning for TB    #positive acid fast tissue and sputum culture   -would hold off on starting treatment for tuberculosis until MTB PCR results as suspicion is low in regards to patient's clinical hx   -in the interim, please obtain quantiferon gold, acid fast sputum x 3, and nocardia culture of the sputum   -f/u MTB PCR and surgical pathology   -if MTB PCR is positive, the following regimen should be started:   ---Pyrazinamide 2g PO once daily  ---ethambutol 1.6 g PO once daily   ---Isoniazid 300 mg PO once daily   ---Rifampin 600 mg PO once daily  ---Vitamin B6 (pyridoxine) 50 mg PO once daily  -f/u all culture data  -monitor WBC and fever curve     Case seen and discussed with Dr. Baltazar who agrees with assessment and plan. Note not final until attending addendum.

## 2025-02-14 NOTE — CONSULT NOTE ADULT - SUBJECTIVE AND OBJECTIVE BOX
Patient is a 54 year old male w/ hx of HTN, COPD, GERD, s/p gastric sleeve (2023 at [facility]), Renal Cell Carcinoma 2021 s/p Left nephrectomy and partial right nephrectomy, current smoker, who presents for scheduled surgical intervention for abnormal lung imaging findings.     CT chest in 12/2024: A 2.0 cm spiculated right upper lobe nodule, 1.6 cm left apical nodule and 0.9 cm left upper lobe nodule are new since October 26, 2022. Findings are highly suspicious for neoplasm. Additional smaller subcentimeter pulmonary nodules are indeterminate.    PET scan performed 1/14: 1. Several FDG avid bilateral lung nodules, unchanged since CT on   12/17/2024, are suspicious for malignancy. Correlate with tissue biopsy. 2. FDG avid AP window lymph node, increased in size since prior CT, likely metastasis. 3. FDG avid right parotid soft tissue nodule. Differential diagnosis includes intraparotid lymph node or salivary gland neoplasm. Follow-up evaluation may be performed with ultrasound and percutaneous dated biopsy. 4. Photopenic heterogeneous partially exophytic right lateral interpolar mass, similar to prior CT. 5. Nonspecific FDG avidity in distal esophagus, possibly inflammatory. 6. Nonspecific FDG avid skin thickening, left scrotal region, may be inflammatory.    Patient s/p bronchoscopy with left upper lobe wedge resection and mediastinal lymph node biopsy on 2/13 with chest tube placed intraop.     ID called for few acid fast bacillus on tissue culture as well as sputum culture with few acid fast bacillus. MTB PCR in process. As per primary team, patient found to have necrotizing granuloma intra op. Surgical path pending.     Patient reports weight loss related to gastric sleeve surgery but otherwise denies fevers, chills, cough, hemoptysis, night sweats. Patient was [place] in the [place] and last international travel was years ago. Denies known TB exposures or recent sick contacts. His wife was known to have a "false positive" TB test in the past. 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 Denies recent steroid use or chemotherapy or his renal cell cancer.       REVIEW OF SYSTEMS  Constitutional: No fevers, No chills, No fatigue   Respiratory: No cough, no SOB  Cardiovascular:  No chest pain, No palpitations   Gastrointestinal: No pain, No nausea, No vomiting, No diarrhea, No constipation	  Neurological: No HA, no weakness, no seizures, no AMS     prior hospital charts reviewed [V]  primary team notes reviewed [V]  other consultant notes reviewed [V]    PAST MEDICAL & SURGICAL HISTORY:  Morbid obesity      Cigarette smoker      HTN (hypertension)      GERD (gastroesophageal reflux disease)      History of prediabetes      Deviated nasal septum      Malignant neoplasm of unspecified kidney, except renal pelvis      Renal cell cancer      Other nonspecific abnormal finding of lung field      COPD (chronic obstructive pulmonary disease)      H/O colonoscopy      S/p nephrectomy  right 9/3/2021      H/O partial nephrectomy      S/P gastric sleeve procedure          SOCIAL HISTORY:  +active smoker   -rest as above       FAMILY HISTORY:  FH: leukemia (Father)    FH: prostate cancer (Uncle)        Allergies  No Known Allergies        ANTIMICROBIALS:      ANTIMICROBIALS (past 90 days):  MEDICATIONS  (STANDING):        OTHER MEDS:   MEDICATIONS  (STANDING):  acetaminophen   IVPB .. 1000 once  ALPRAZolam 0.5 every 8 hours PRN  fluticasone propionate/ salmeterol 100-50 MICROgram(s) Diskus 1 two times a day  heparin   Injectable 7500 every 8 hours  HYDROmorphone  Injectable 0.5 every 4 hours PRN  metoprolol tartrate 12.5 every 12 hours  ondansetron Injectable 4 every 6 hours PRN  oxyCODONE    IR 5 every 3 hours PRN  oxyCODONE    IR 10 every 3 hours PRN  pantoprazole  Injectable 40 daily  polyethylene glycol 3350 17 daily  senna 2 at bedtime  sodium chloride 3%  Inhalation 4 every 8 hours PRN  tiotropium 2.5 MICROgram(s) Inhaler 2 daily      VITALS:  Vital Signs Last 24 Hrs  T(F): 97.9 (02-14-25 @ 12:00), Max: 98.1 (02-14-25 @ 08:00)    Vital Signs Last 24 Hrs  HR: 88 (02-14-25 @ 16:00) (62 - 99)  BP: 172/133 (02-14-25 @ 16:00) (118/89 - 172/133)  RR: 25 (02-14-25 @ 16:00)  SpO2: 93% (02-14-25 @ 16:00) (91% - 98%)  Wt(kg): --    EXAM:  General: Patient appears comfortable, no acute distress  HEENT: NCAT, PERRL  CV: +S1/S2, RRR, no M/R/G  Lungs: +decreased breath sounds in the right lower lung fields with chest tube in place with serosanguinous output   Abd:  BS4+, Soft, NTND, no guarding  : No suprapubic tenderness  Neuro: AAOx3. No focal deficits noted.   Ext: No cyanosis, no edema  Msk: freely moving upper and lower extremities  Skin: No rash, no phlebitis, No erythema       Labs:                        15.9   11.69 )-----------( 181      ( 14 Feb 2025 03:00 )             50.6     02-14    136  |  99  |  17  ----------------------------<  111[H]  4.6   |  24  |  1.07    Ca    8.5      14 Feb 2025 03:00  Phos  3.1     02-14  Mg     2.00     02-14    TPro  4.9[L]  /  Alb  3.3  /  TBili  0.4  /  DBili  x   /  AST  9   /  ALT  9   /  AlkPhos  68  02-14      WBC Trend:  WBC Count: 11.69 (02-14-25 @ 03:00)      Auto Neutrophil #: 5.36 K/uL (02-07-25 @ 08:44)      Creatine Trend:  Creatinine: 1.07 (02-14)      Liver Biochemical Testing Trend:  Alanine Aminotransferase (ALT/SGPT): 9 (02-14)  Aspartate Aminotransferase (AST/SGOT): 9 (02-14-25 @ 03:00)  Bilirubin Total: 0.4 (02-14)      Trend LDH          MICROBIOLOGY:    MRSA PCR Result.: NotDetec (02-13-25 @ 15:57)      Culture - Acid Fast - Sputum w/Smear (collected 13 Feb 2025 15:35)  Source: .Sputum Sputum    Culture - Acid Fast - Tissue w/Smear (collected 13 Feb 2025 11:12)  Source: Tissue  Preliminary Report:    Culture is being performed.    Culture - Fungal, Tissue (collected 13 Feb 2025 11:12)  Source: Tissue  Preliminary Report:    Testing in progress    Culture - Tissue with Gram Stain (collected 13 Feb 2025 11:12)  Source: Tissue  Preliminary Report:    No growth to date                                                    RADIOLOGY:    ACC: [id number] EXAM:  XR CHEST PORTABLE ROUTINE 1V   ORDERED BY: SCOT ARIAS     PROCEDURE DATE:  02/14/2025          INTERPRETATION:  CLINICAL INFORMATION: Post left thoracotomy    TIME OF EXAMINATION: February 14 at 5:03 AM    EXAM: Portable chest    FINDINGS:    Left-sided chest tube unchanged.  Hazy left lung base may represent small effusion.  Left apical pneumothorax with clear lungs.        COMPARISON: February 13        IMPRESSION: Post left thoracotomy with chest tube and apical pneumothorax.    --- End of Report ---            FRANKLYN CARRANZA MD; Attending Radiologist  This document has been electronically signed. Feb 14 2025  9:48AM

## 2025-02-14 NOTE — CONSULT NOTE ADULT - ATTENDING COMMENTS
54-yo M w/ PMH of obesity s/p gastric sleeve, RCC s/p L nephrectomy and partial R nephrectomy, and recent imaging findings suggestive of multiple lung nodules, presenting for MELISSA wedge resection and LN biopsy (2/13), consulted for AFB smear positive on tissue and sputum.    Recent CT chest (12/17/24) finding suspicious for 2-cm spiculated RUL nodule, 1.6 cm L apical nodule, and 0.9 cm MELISSA nodule. Also PET/CT scan 1/14/25 w/ several FDG-avid b/l lung nodules, increased LN, and R parotid soft tissue nodule. Raises concern for malignancy.    Also per primary team, intra-op finding was suggestive of necrotizing granuloma, raises concern for infectious etiology. Ddx includes MTB, NTM, and Nocardia.    #Necrotizing granuloma  #AFB positivity  #Lung nodules  #Abnormal CT scan    Recommendations:  - F/u AFB culture results  - Communicated with [facility] to process MTB PCR on AFB+ specimens  - Hold off antibiotics  - Send Nocardia culture (order placed)  - Send Quantiferon  - Airborne isolation    Plan discussed with CTICU team  Discussed with patient and family at length.    Topics relating to disease transmission risk assessment and mitigation, complex antimicrobial therapy counseling and treatment, and/or public health investigation, analysis, and testing were addressed.    Thank you for this consult. Inpatient ID team will follow.        Eleazar Baltazar MD, PhD  Attending Physician  Division of Infectious Diseases  Department of Medicine    Please contact through MS Teams message.  Office: [phone] (after 5 PM or weekend)

## 2025-02-14 NOTE — PROGRESS NOTE ADULT - SUBJECTIVE AND OBJECTIVE BOX
CHIEF COMPLAINT: FOLLOW UP IN ICU FOR POSTOPERATIVE CARE OF PATIENT WHO IS S/P Left upper lobe wedge resection       PROCEDURES:     Flexible bronchoscopy, Left uniportal VATS, Left upper lobe wedge resection and mediastinal lymph node biopsy  2/13/25     ISSUES:     Lung nodule              Postop pain              Chest tube in place              At risk for respiratory complications  Morbid obesity  Cigarette smoker  HTN (hypertension)  GERD (gastroesophageal reflux disease)  History of prediabetes  Deviated nasal septum  Malignant neoplasm of unspecified kidney, except renal pelvis  Renal cell cancer s/p S/p nephrectomy right 9/3/2021  COPD (chronic obstructive pulmonary disease)  H/O partial nephrectomy - L  S/P gastric sleeve procedure      INTERVAL EVENTS:   CXR with increased right pneumothorax, chest tube placed to suction  Restart home ARB, monitor creat, h/o nephrectomy, keep [id number], given hydral iv x 1  On room air  Pending sputum AFB, on airborne precautions      HISTORY:   Patient reports moderate pain at chest wall incision sites which is worse with coughing and deep breathing without associated fever or dyspnea. Pain is improved with use of pain meds.     PHYSICAL EXAM:   Gen: Comfortable, No acute distress  Eyes: Sclera white, Conjunctiva normal, Eyelids normal, Pupils symmetrical   ENT: Mucous membranes moist,  ,  ,    Neck: Trachea midline,  ,  ,  ,  ,  ,    CV: Rate regular, Rhythm regular,  ,  ,    Resp: Breath sounds clear, No accessory muscles use, R chest tube in place,  ,    Abd: Soft, Non-distended, Non-tender, Bowel sounds normal,  ,  ,    Skin: Warm, No peripheral edema of lower extremities,  ,    : No shukla  Neuro: Moving all 4 extremities,    Psych: A&Ox3      ASSESSMENT AND PLAN:     NEURO:  Post-operative Pain - Stable. Pain control with oxycodone and Tylenol IV PRN.               RESPIRATORY:  Hypoxia - Wean nasal cannula for goal O2sat above 92. Obtain CXR. Incentive spirometry. Chest PT and frequent suctioning. Continue bronchodilators. OOB to chair & ambulate w/ assistance. Continuous pulse oximetry for support & to prevent decompensation.  Chest tube – Pleurevac regulated suctioning. Monitor chest tube output.               CARDIOVASCULAR:  Hemodynamically stable - Not on pressors. Continue hemodynamic monitoring.  Telemetry (medical test) - Reviewed by me today independently. Normal sinus rhythm.  HTN - restart home ARB, continue metoprolol          RENAL:  H/o left and partial right nephrectomy, Stable - Monitor IOs and electrolytes. Keep K above 4.0 and Mg above 2.0.               GASTROINTESTINAL:  GI prophylaxis not indicated  Zofran and Reglan IV PRN for nausea  Regular consistency diet              HEMATOLOGIC:  No signs of active bleeding. Monitor Hgb in CBC in AM  DVT prophylaxis with heparin subQ and SCDs.               INFECTIOUS DISEASE:  All surgical sites appear clean. No signs of active infection. Will monitor for fever and leukocytosis. F/u tissue and sputum AFb             ENDOCRINE:  Stable – Monitor glucose fingersticks for goal 120-180.           ONCOLOGY:  Lung nodule - Improved. S/P resection. Follow up final pathology.      Pertinent clinical, laboratory, radiographic, hemodynamic, echocardiographic, respiratory data, microbiologic data and chart were reviewed by myself and analyzed frequently throughout the course of the day and night by myself.    Plan discussed at length with the CTICU staff and Attending CT Surgeon -   Dr Corky Iraheta    Patient's status was discussed with patient at bedside.    I have spent 50 minutes of time with this patient monitoring hemodynamic status, respiratory status, and coordinating care in the ICU.    _________________________  VITAL SIGNS:  Vital Signs Last 24 Hrs  T(C): 36.7 (14 Feb 2025 08:00), Max: 36.7 (14 Feb 2025 08:00)  T(F): 98.1 (14 Feb 2025 08:00), Max: 98.1 (14 Feb 2025 08:00)  HR: 67 (14 Feb 2025 12:00) (62 - 99)  BP: 164/102 (14 Feb 2025 12:00) (118/89 - 169/95)  BP(mean): 120 (14 Feb 2025 12:00) (96 - 129)  RR: 13 (14 Feb 2025 12:00) (11 - 22)  SpO2: 91% (14 Feb 2025 12:00) (91% - 98%)    Parameters below as of 14 Feb 2025 07:00  Patient On (Oxygen Delivery Method): room air      I/Os:   I&O's Detail    13 Feb 2025 07:01  -  14 Feb 2025 07:00  --------------------------------------------------------  IN:    IV PiggyBack: 200 mL    Lactated Ringers: 660 mL  Total IN: 860 mL    OUT:    Chest Tube (mL): 35 mL    Voided (mL): 950 mL  Total OUT: 985 mL    Total NET: -125 mL      14 Feb 2025 07:01  -  14 Feb 2025 12:04  --------------------------------------------------------  IN:    Lactated Ringers: 60 mL  Total IN: 60 mL    OUT:    Chest Tube (mL): 5 mL    Voided (mL): 125 mL  Total OUT: 130 mL    Total NET: -70 mL              MEDICATIONS:  MEDICATIONS  (STANDING):  acetaminophen   IVPB .. 1000 milliGRAM(s) IV Intermittent once  chlorhexidine 2% Cloths 1 Application(s) Topical daily  fluticasone propionate/ salmeterol 100-50 MICROgram(s) Diskus 1 Dose(s) Inhalation two times a day  heparin   Injectable 7500 Unit(s) SubCutaneous every 8 hours  hydrALAZINE Injectable 5 milliGRAM(s) IV Push once  lactated ringers. 1000 milliLiter(s) (30 mL/Hr) IV Continuous <Continuous>  lidocaine   4% Patch 1 Patch Transdermal every 24 hours  losartan 25 milliGRAM(s) Oral once  metoprolol tartrate 12.5 milliGRAM(s) Oral every 12 hours  pantoprazole  Injectable 40 milliGRAM(s) IV Push daily  polyethylene glycol 3350 17 Gram(s) Oral daily  senna 2 Tablet(s) Oral at bedtime  tiotropium 2.5 MICROgram(s) Inhaler 2 Puff(s) Inhalation daily    MEDICATIONS  (PRN):  HYDROmorphone  Injectable 0.5 milliGRAM(s) IV Push every 4 hours PRN Severe Breakthrough Pain (7 - 10)  naloxone Injectable 0.1 milliGRAM(s) IV Push every 3 minutes PRN For ANY of the following changes in patient status:  A. RR LESS THAN 10 breaths per minute, B. Oxygen saturation LESS THAN 90%, C. Sedation score of 6  ondansetron Injectable 4 milliGRAM(s) IV Push every 6 hours PRN Nausea  oxyCODONE    IR 5 milliGRAM(s) Oral every 3 hours PRN Moderate Pain (4 - 6)  oxyCODONE    IR 10 milliGRAM(s) Oral every 3 hours PRN Severe Pain (7 - 10)  sodium chloride 3%  Inhalation 4 milliLiter(s) Inhalation every 8 hours PRN Sputum Induction      LABS:  Laboratory data was independently reviewed by me today.                           15.9   11.69 )-----------( 181      ( 14 Feb 2025 03:00 )             50.6     02-14    136  |  99  |  17  ----------------------------<  111[H]  4.6   |  24  |  1.07    Ca    8.5      14 Feb 2025 03:00  Phos  3.1     02-14  Mg     2.00     02-14    TPro  4.9[L]  /  Alb  3.3  /  TBili  0.4  /  DBili  x   /  AST  9   /  ALT  9   /  AlkPhos  68  02-14    LIVER FUNCTIONS - ( 14 Feb 2025 03:00 )  Alb: 3.3 g/dL / Pro: 4.9 g/dL / ALK PHOS: 68 U/L / ALT: 9 U/L / AST: 9 U/L / GGT: x               Urinalysis Basic - ( 14 Feb 2025 03:00 )    Color: x / Appearance: x / SG: x / pH: x  Gluc: 111 mg/dL / Ketone: x  / Bili: x / Urobili: x   Blood: x / Protein: x / Nitrite: x   Leuk Esterase: x / RBC: x / WBC x   Sq Epi: x / Non Sq Epi: x / Bacteria: x        RADIOLOGY:   Radiology images were independently reviewed by me today. Reports were reviewed by me today.    Xray Chest 1 View- PORTABLE-Routine:   ACC: [id number] EXAM:  XR CHEST PORTABLE ROUTINE 1V   ORDERED BY: SCOT ARIAS     PROCEDURE DATE:  02/14/2025          INTERPRETATION:  CLINICAL INFORMATION: Post left thoracotomy    TIME OF EXAMINATION: February 14 at 5:03 AM    EXAM: Portable chest    FINDINGS:    Left-sided chest tube unchanged.  Hazy left lung base may represent small effusion.  Left apical pneumothorax with clear lungs.        COMPARISON: February 13        IMPRESSION: Post left thoracotomy with chest tube and apical pneumothorax.    --- End of Report ---            FRANKLYN CARRANZA MD; Attending Radiologist  This document has been electronically signed. Feb 14 2025  9:48AM (02-14-25 @ 06:05)  Xray Chest 1 View- PORTABLE-Urgent:   ACC: [id number] EXAM:  XR CHEST PORTABLE URGENT 1V   ORDERED BY: SCOT ARIAS     PROCEDURE DATE:  02/13/2025          INTERPRETATION:  CLINICAL INFORMATION: Left thoracotomy    TIME OF EXAMINATION: February 13, 2025 at 10:50 AM    EXAM: Portable chest    FINDINGS:    Left-sided chest tube is seen with the tip overlying the apex of this   lung.  No significant pneumothorax.  The lungs are clear.  The heart is not enlarged.  No effusions.        COMPARISON: September 7, 2021        IMPRESSION: Status post left thoracotomy with chest tube and clear lungs.    --- End of Report ---            FRANKLYN CARRANZA MD; Attending Radiologist  This document has been electronically signed. Feb 13 2025 11:47AM (02-13-25 @ 10:57)

## 2025-02-14 NOTE — SBIRT NOTE ADULT - NSSBIRTDRGBLOUTFLBACK_GEN_A_CORE
Patient ID:  Brooke Denton  [id number]  65 y.o.  1958    Today: October 6, 2022       CHIEF COMPLAINT:  Follow-up of right total hip replacement    HISTORY:  The patient presents today for 3-week follow-up after total hip replacement. The patient continues to improve gradually. Working with physical therapy on strengthening and stretching. They are on aspirin for DVT prophylaxis. Using assistive walking device appropriately. Continues to take medication appropriately. PE:  Incision is examined which is healing well. No erythema, induration or drainage. No significant fluid accumulation around the surgical site distally. Distally able to grossly plantar and dorsiflex foot and ankle. Sensation intact. Limb is perfused. No sign of DVT. X-RAYS:    XR Pelvis 2/3 Views  Views Obtained: AP Pelvis and Frog leg lateral of right hip  Indication: Postop Right Total Hip Replacement  Findings:   X-rays are viewed which show total hip replacement in place on the right side. All hardware appears to be stable compared to immediate postoperative films. The acetabular component appears to be in good position, no evidence of loosening. Anteversion and inclination angle appear to be within acceptable criteria. The stem appears to be appropriately sized without any evidence of subsidence. No evidence of proximal femoral periprosthetic fracture. Hip is reduced. Impression: Normal Xray after total hip replacement    JENN JOSHI - CNP    ASSESSMENT:  3 Weeks S/P Right Total Hip Replacement    PLAN:  Continue activity and current weight bearing status. Continue posterior hip precautions if applicable. Continue to take the aspirin for another week for a full month of DVT prophylaxis. They are given a refill on their pain medication if they feel they are going to run out. The patient is given a script for antibiotics to be taken for dental prophylaxis.   I have asked the patient not to submerge the incision under water or place any creams or oils over this for another week or two. I will plan to check them back in 3 months for follow-up or sooner if they have any issues, questions or concerns.        Signed By: JENN Jackson - CARLITA  October 6, 2022 No

## 2025-02-14 NOTE — PROGRESS NOTE ADULT - SUBJECTIVE AND OBJECTIVE BOX
Anesthesia Pain Management Service    SUBJECTIVE: Patient is doing well with IV PCA and no significant problems reported.    Pain Scale Score	At rest: ___ 	With Activity: ___ 	[X ] Refer to charted pain scores    THERAPY:    [ ] IV PCA Morphine		[ ] 5 mg/mL	[ ] 1 mg/mL  [X ] IV PCA Hydromorphone	[ ] 5 mg/mL	[X ] 1 mg/mL  [ ] IV PCA Fentanyl		[ ] 50 micrograms/mL    Demand dose __0.2_ lockout __6_ (minutes) Continuous Rate _0__ Total: _6.6__   mg used (in past 24 hrs)      MEDICATIONS  (STANDING):  acetaminophen   IVPB .. 1000 milliGRAM(s) IV Intermittent once  chlorhexidine 2% Cloths 1 Application(s) Topical daily  fluticasone propionate/ salmeterol 100-50 MICROgram(s) Diskus 1 Dose(s) Inhalation two times a day  heparin   Injectable 7500 Unit(s) SubCutaneous every 8 hours  lactated ringers. 1000 milliLiter(s) (30 mL/Hr) IV Continuous <Continuous>  lidocaine   4% Patch 1 Patch Transdermal every 24 hours  losartan 25 milliGRAM(s) Oral daily  metoprolol tartrate 12.5 milliGRAM(s) Oral every 12 hours  pantoprazole  Injectable 40 milliGRAM(s) IV Push daily  polyethylene glycol 3350 17 Gram(s) Oral daily  senna 2 Tablet(s) Oral at bedtime  tiotropium 2.5 MICROgram(s) Inhaler 2 Puff(s) Inhalation daily    MEDICATIONS  (PRN):  HYDROmorphone  Injectable 0.5 milliGRAM(s) IV Push every 4 hours PRN Severe Breakthrough Pain (7 - 10)  naloxone Injectable 0.1 milliGRAM(s) IV Push every 3 minutes PRN For ANY of the following changes in patient status:  A. RR LESS THAN 10 breaths per minute, B. Oxygen saturation LESS THAN 90%, C. Sedation score of 6  ondansetron Injectable 4 milliGRAM(s) IV Push every 6 hours PRN Nausea  oxyCODONE    IR 5 milliGRAM(s) Oral every 3 hours PRN Moderate Pain (4 - 6)  oxyCODONE    IR 10 milliGRAM(s) Oral every 3 hours PRN Severe Pain (7 - 10)  sodium chloride 3%  Inhalation 4 milliLiter(s) Inhalation every 8 hours PRN Sputum Induction      OBJECTIVE: Patient sitting in chair, CTx1    Sedation Score:	[ X] Alert	[ ] Drowsy 	[ ] Arousable	[ ] Asleep	[ ] Unresponsive    Side Effects:	[X ] None	[ ] Nausea	[ ] Vomiting	[ ] Pruritus  		[ ] Other:    Vital Signs Last 24 Hrs  T(C): 36.7 (14 Feb 2025 08:00), Max: 36.7 (13 Feb 2025 12:00)  T(F): 98.1 (14 Feb 2025 08:00), Max: 98.1 (14 Feb 2025 08:00)  HR: 77 (14 Feb 2025 11:00) (62 - 99)  BP: 169/95 (14 Feb 2025 11:00) (118/89 - 169/95)  BP(mean): 117 (14 Feb 2025 11:00) (96 - 129)  RR: 20 (14 Feb 2025 11:00) (11 - 22)  SpO2: 96% (14 Feb 2025 11:00) (92% - 98%)    Parameters below as of 14 Feb 2025 07:00  Patient On (Oxygen Delivery Method): room air        ASSESSMENT/ PLAN    Therapy to  be:	[ ] Continue   [ X] Discontinued   [X ] Change to prn Analgesics    Documentation and Verification of current medications:   [X] Done	[ ] Not done, not elligible    Comments: Discussed with CTICU, PCA discontinued. PRN Oral/IV opioids and/or Adjuvant non-opioid medication to be ordered at this point.    Progress Note written now but Patient was seen earlier.

## 2025-02-15 LAB
ANION GAP SERPL CALC-SCNC: 14 MMOL/L — SIGNIFICANT CHANGE UP (ref 7–14)
BUN SERPL-MCNC: 17 MG/DL — SIGNIFICANT CHANGE UP (ref 7–23)
CA-I BLD-SCNC: 1.11 MMOL/L — LOW (ref 1.15–1.29)
CALCIUM SERPL-MCNC: 8.7 MG/DL — SIGNIFICANT CHANGE UP (ref 8.4–10.5)
CHLORIDE SERPL-SCNC: 100 MMOL/L — SIGNIFICANT CHANGE UP (ref 98–107)
CO2 SERPL-SCNC: 23 MMOL/L — SIGNIFICANT CHANGE UP (ref 22–31)
CREAT SERPL-MCNC: 1.05 MG/DL — SIGNIFICANT CHANGE UP (ref 0.5–1.3)
DEPRECATED M TB RPOB XXX QL NAA+PROBE: SIGNIFICANT CHANGE UP
EGFR: 84 ML/MIN/1.73M2 — SIGNIFICANT CHANGE UP
GLUCOSE SERPL-MCNC: 99 MG/DL — SIGNIFICANT CHANGE UP (ref 70–99)
HCT VFR BLD CALC: 49 % — SIGNIFICANT CHANGE UP (ref 39–50)
HGB BLD-MCNC: 16.2 G/DL — SIGNIFICANT CHANGE UP (ref 13–17)
M TB CMPLX DNA SPT/BRO QL NAA+PROBE: SIGNIFICANT CHANGE UP
M TB CMPLX DNA SPT/BRO QL NAA+PROBE: SIGNIFICANT CHANGE UP
MAGNESIUM SERPL-MCNC: 2 MG/DL — SIGNIFICANT CHANGE UP (ref 1.6–2.6)
MCHC RBC-ENTMCNC: 29.9 PG — SIGNIFICANT CHANGE UP (ref 27–34)
MCHC RBC-ENTMCNC: 33.1 G/DL — SIGNIFICANT CHANGE UP (ref 32–36)
MCV RBC AUTO: 90.4 FL — SIGNIFICANT CHANGE UP (ref 80–100)
MTB RIF RES GENE SPT NAA+PROBE: SIGNIFICANT CHANGE UP
NRBC # BLD AUTO: 0 K/UL — SIGNIFICANT CHANGE UP (ref 0–0)
NRBC # FLD: 0 K/UL — SIGNIFICANT CHANGE UP (ref 0–0)
NRBC BLD AUTO-RTO: 0 /100 WBCS — SIGNIFICANT CHANGE UP (ref 0–0)
PHOSPHATE SERPL-MCNC: 3.2 MG/DL — SIGNIFICANT CHANGE UP (ref 2.5–4.5)
PLATELET # BLD AUTO: 179 K/UL — SIGNIFICANT CHANGE UP (ref 150–400)
POTASSIUM SERPL-MCNC: 4.2 MMOL/L — SIGNIFICANT CHANGE UP (ref 3.5–5.3)
POTASSIUM SERPL-SCNC: 4.2 MMOL/L — SIGNIFICANT CHANGE UP (ref 3.5–5.3)
RBC # BLD: 5.42 M/UL — SIGNIFICANT CHANGE UP (ref 4.2–5.8)
RBC # FLD: 14.1 % — SIGNIFICANT CHANGE UP (ref 10.3–14.5)
SODIUM SERPL-SCNC: 137 MMOL/L — SIGNIFICANT CHANGE UP (ref 135–145)
WBC # BLD: 9.14 K/UL — SIGNIFICANT CHANGE UP (ref 3.8–10.5)
WBC # FLD AUTO: 9.14 K/UL — SIGNIFICANT CHANGE UP (ref 3.8–10.5)

## 2025-02-15 PROCEDURE — 71045 X-RAY EXAM CHEST 1 VIEW: CPT | Mod: 26,77

## 2025-02-15 PROCEDURE — 71045 X-RAY EXAM CHEST 1 VIEW: CPT | Mod: 26

## 2025-02-15 PROCEDURE — 99232 SBSQ HOSP IP/OBS MODERATE 35: CPT

## 2025-02-15 PROCEDURE — G0545: CPT

## 2025-02-15 PROCEDURE — 99233 SBSQ HOSP IP/OBS HIGH 50: CPT

## 2025-02-15 RX ORDER — LABETALOL HYDROCHLORIDE 200 MG/1
200 TABLET, FILM COATED ORAL EVERY 8 HOURS
Refills: 0 | Status: DISCONTINUED | OUTPATIENT
Start: 2025-02-15 | End: 2025-02-23

## 2025-02-15 RX ADMIN — OXYCODONE HYDROCHLORIDE 10 MILLIGRAM(S): 30 TABLET ORAL at 05:23

## 2025-02-15 RX ADMIN — LABETALOL HYDROCHLORIDE 200 MILLIGRAM(S): 200 TABLET, FILM COATED ORAL at 21:48

## 2025-02-15 RX ADMIN — OXYCODONE HYDROCHLORIDE 10 MILLIGRAM(S): 30 TABLET ORAL at 09:29

## 2025-02-15 RX ADMIN — Medication 2 TABLET(S): at 21:49

## 2025-02-15 RX ADMIN — HEPARIN SODIUM 7500 UNIT(S): 1000 INJECTION INTRAVENOUS; SUBCUTANEOUS at 05:24

## 2025-02-15 RX ADMIN — Medication 40 MILLIGRAM(S): at 13:30

## 2025-02-15 RX ADMIN — OXYCODONE HYDROCHLORIDE 10 MILLIGRAM(S): 30 TABLET ORAL at 10:25

## 2025-02-15 RX ADMIN — HEPARIN SODIUM 7500 UNIT(S): 1000 INJECTION INTRAVENOUS; SUBCUTANEOUS at 21:48

## 2025-02-15 RX ADMIN — TIOTROPIUM BROMIDE INHALATION SPRAY 2 PUFF(S): 3.12 SPRAY, METERED RESPIRATORY (INHALATION) at 10:15

## 2025-02-15 RX ADMIN — OXYCODONE HYDROCHLORIDE 10 MILLIGRAM(S): 30 TABLET ORAL at 06:30

## 2025-02-15 RX ADMIN — Medication 1 DOSE(S): at 21:59

## 2025-02-15 RX ADMIN — HEPARIN SODIUM 7500 UNIT(S): 1000 INJECTION INTRAVENOUS; SUBCUTANEOUS at 13:31

## 2025-02-15 RX ADMIN — Medication 1 DOSE(S): at 10:15

## 2025-02-15 RX ADMIN — OXYCODONE HYDROCHLORIDE 10 MILLIGRAM(S): 30 TABLET ORAL at 18:40

## 2025-02-15 RX ADMIN — METOPROLOL SUCCINATE 12.5 MILLIGRAM(S): 50 TABLET, EXTENDED RELEASE ORAL at 05:24

## 2025-02-15 RX ADMIN — LABETALOL HYDROCHLORIDE 200 MILLIGRAM(S): 200 TABLET, FILM COATED ORAL at 13:31

## 2025-02-15 RX ADMIN — LOSARTAN POTASSIUM 50 MILLIGRAM(S): 100 TABLET, FILM COATED ORAL at 05:23

## 2025-02-15 RX ADMIN — OXYCODONE HYDROCHLORIDE 10 MILLIGRAM(S): 30 TABLET ORAL at 23:55

## 2025-02-15 RX ADMIN — OXYCODONE HYDROCHLORIDE 10 MILLIGRAM(S): 30 TABLET ORAL at 17:58

## 2025-02-15 RX ADMIN — LIDOCAINE HYDROCHLORIDE 1 PATCH: 20 JELLY TOPICAL at 05:00

## 2025-02-15 NOTE — PROGRESS NOTE ADULT - SUBJECTIVE AND OBJECTIVE BOX
Follow Up:    AFB+    Interval History/ROS:  Afebrile ON. MTB PCR result not available yet. Patient was seen and examined at bedside. +OOB. No acute complaints. No fever. No SOB    Allergies  No Known Allergies        ANTIMICROBIALS:      OTHER MEDS:  MEDICATIONS  (STANDING):  acetaminophen   IVPB .. 1000 once  ALPRAZolam 0.5 every 8 hours PRN  fluticasone propionate/ salmeterol 100-50 MICROgram(s) Diskus 1 two times a day  heparin   Injectable 7500 every 8 hours  HYDROmorphone  Injectable 0.5 every 4 hours PRN  labetalol 200 every 8 hours  losartan 50 daily  ondansetron Injectable 4 every 6 hours PRN  oxyCODONE    IR 5 every 3 hours PRN  oxyCODONE    IR 10 every 3 hours PRN  pantoprazole  Injectable 40 daily  polyethylene glycol 3350 17 daily  senna 2 at bedtime  sodium chloride 3%  Inhalation 4 every 8 hours PRN  tiotropium 2.5 MICROgram(s) Inhaler 2 daily      Vital Signs Last 24 Hrs  T(C): 36.6 (15 Feb 2025 12:00), Max: 36.9 (14 Feb 2025 20:00)  T(F): 97.9 (15 Feb 2025 12:00), Max: 98.4 (14 Feb 2025 20:00)  HR: 83 (15 Feb 2025 11:00) (68 - 99)  BP: 173/101 (15 Feb 2025 11:00) (147/92 - 182/105)  BP(mean): 122 (15 Feb 2025 11:00) (102 - 135)  RR: 23 (15 Feb 2025 11:00) (13 - 28)  SpO2: 94% (15 Feb 2025 11:00) (91% - 99%)    Parameters below as of 15 Feb 2025 07:00  Patient On (Oxygen Delivery Method): room air        PHYSICAL EXAM:  Constitutional: non-toxic, no distress, OOB  HEAD/EYES: anicteric, no conjunctival injection  ENT:  supple  Cardiovascular:   normal S1, S2, no murmur, RRR  Respiratory:  clear BS bilaterally  GI:  soft, non-tender  Neurologic: awake and oriented x3  Skin:  Chest tube to L chest wall                                16.2   9.14  )-----------( 179      ( 15 Feb 2025 03:45 )             49.0       02-15    137  |  100  |  17  ----------------------------<  99  4.2   |  23  |  1.05    Ca    8.7      15 Feb 2025 03:45  Phos  3.2     02-15  Mg     2.00     02-15    TPro  4.9[L]  /  Alb  3.3  /  TBili  0.4  /  DBili  x   /  AST  9   /  ALT  9   /  AlkPhos  68  02-14      Urinalysis Basic - ( 15 Feb 2025 03:45 )    Color: x / Appearance: x / SG: x / pH: x  Gluc: 99 mg/dL / Ketone: x  / Bili: x / Urobili: x   Blood: x / Protein: x / Nitrite: x   Leuk Esterase: x / RBC: x / WBC x   Sq Epi: x / Non Sq Epi: x / Bacteria: x        MICROBIOLOGY:  v  Bronchial  02-14-25   Testing in progress  --  --      .Sputum Sputum  02-14-25 --  --  --      .Sputum Sputum  02-14-25 --  --  --      .Sputum Sputum  02-13-25 --  --  --      Tissue  02-13-25   No growth to date  --    No polymorphonuclear leukocytes per low power field  No organisms seen per oil power field                RADIOLOGY:  Imaging below independently reviewed.  < from: Xray Chest 1 View- PORTABLE-Routine (Xray Chest 1 View- PORTABLE-Routine in AM.) (02.14.25 @ 06:05) >    ACC: [id number] EXAM:  XR CHEST PORTABLE ROUTINE 1V   ORDERED BY: SCOT ARIAS     PROCEDURE DATE:  02/14/2025          INTERPRETATION:  CLINICAL INFORMATION: Post left thoracotomy    TIME OF EXAMINATION: February 14 at 5:03 AM    EXAM: Portable chest    FINDINGS:    Left-sided chest tube unchanged.  Hazy left lung base may represent small effusion.  Left apical pneumothorax with clear lungs.        COMPARISON: February 13        IMPRESSION: Post left thoracotomy with chest tube and apical pneumothorax.    --- End of Report ---            FRANKLYN CARRANZA MD; Attending Radiologist  This document has been electronically signed. Feb 14 2025  9:48AM    < end of copied text >

## 2025-02-15 NOTE — PROGRESS NOTE ADULT - ASSESSMENT
54-yo M w/ PMH of obesity s/p gastric sleeve, RCC s/p L nephrectomy and partial R nephrectomy, and recent imaging findings suggestive of multiple lung nodules, presenting for MELISSA wedge resection and LN biopsy (2/13), consulted for AFB smear positive on tissue and sputum (2/13). Sputum from 2/14 neg AFB.    Recent CT chest (12/17/24) finding suspicious for 2-cm spiculated RUL nodule, 1.6 cm L apical nodule, and 0.9 cm MELISSA nodule. Also PET/CT scan 1/14/25 w/ several FDG-avid b/l lung nodules, increased LN, and R parotid soft tissue nodule. Raises concern for malignancy.    Also per primary team, intra-op finding was suggestive of necrotizing granuloma, raises concern for infectious etiology. Ddx includes MTB, NTM, and Nocardia.  MTB PCR in process. Communicated with microbiology lab. If neg, might need to wait until further data becomes available (e.g. Quantiferon, AFB culture growth..)    #Necrotizing granuloma  #AFB positivity  #Lung nodules  #Abnormal CT scan    Recommendations:  - F/u AFB culture results  - Communicated with [employer] to process MTB PCR on AFB+ specimens.  - Hold off antibiotics  - F/u Nocardia culture and Quantiferon  - Airborne isolation    Plan discussed with CTICU provider.    Topics relating to disease transmission risk assessment and mitigation, complex antimicrobial therapy counseling and treatment, and/or public health investigation, analysis, and testing were addressed.    Thank you for this consult. Inpatient ID team will follow.        Eleazar Baltazar MD, PhD  Attending Physician  Division of Infectious Diseases  Department of Medicine    Please contact through MS Teams message.  Office: [phone] (after 5 PM or weekend) .

## 2025-02-15 NOTE — PROGRESS NOTE ADULT - SUBJECTIVE AND OBJECTIVE BOX
YAYA MARIA      54y   Male   MRN-[id number]         No Known Allergies             Daily     Daily Drug Dosing Weight  Height (cm): 180.3 (13 Feb 2025 06:06)  Weight (kg): 122.5 (13 Feb 2025 06:06)  BMI (kg/m2): 37.7 (13 Feb 2025 06:06)  BSA (m2): 2.4 (13 Feb 2025 06:06)    54 year old male, current smoker, w/ hx of HTN, COPD, GERD, s/p gastric sleeve (2023 at [facility]), Renal Cell Carcinoma 2021 s/p Left nephrectomy and partial right nephrectomy. Presents with incidental findings of  multiple FDG avid lung nodules and lymphadenopathy. Scheduled for flexible bronchoscopy left video assisted thorascopic surgery, lung resection, possible mediastinal lymph node dissection  (07 Feb 2025 08:44)      CHIEF COMPLAINT: Follow up in ICU  for postoperative care of patient who is s/p lung surgery    Procedure: Flexible bronchoscopy, Left uniportal VATS, Left upper lobe wedge resection and mediastinal lymph node biopsy  2/13/25                     Issues:  Necrotizing granuloma              Lung nodule              Postop pain              Chest tube in place              At risk for respiratory complications              At risk for PCA infusion related complications  Morbid obesity  Cigarette smoker  HTN (hypertension)  GERD (gastroesophageal reflux disease)  History of prediabetes  Deviated nasal septum  Malignant neoplasm of unspecified kidney, except renal pelvis  Renal cell cancer s/p S/p nephrectomy right 9/3/2021  COPD (chronic obstructive pulmonary disease)  H/O partial nephrectomy - L  S/P gastric sleeve procedure      Postop course:     Patient reports moderate pain at chest wall incision sites which is worse with coughing and deep breathing without associated fever or dyspnea. Pain is improved with use of PCA and  oral pain meds.         Home Medications:  losartan 50 mg oral tablet: 1 tab(s) orally once a day AM (13 Feb 2025 06:22)  metoprolol: 25 milligram(s) orally once a day (13 Feb 2025 06:22)  tadalafil 20 mg oral tablet: 1 tab(s) orally once a day (13 Feb 2025 06:22)  Trelegy Ellipta 200 mcg-62.5 mcg-25 mcg/inh inhalation powder: 1 puff(s) inhaled once a day (13 Feb 2025 06:22)    PAST MEDICAL & SURGICAL HISTORY:  Morbid obesity      Cigarette smoker      HTN (hypertension)      GERD (gastroesophageal reflux disease)      History of prediabetes      Deviated nasal septum      Malignant neoplasm of unspecified kidney, except renal pelvis      Renal cell cancer      Other nonspecific abnormal finding of lung field      COPD (chronic obstructive pulmonary disease)      H/O colonoscopy      S/p nephrectomy  right 9/3/2021      H/O partial nephrectomy      S/P gastric sleeve procedure        Vital Signs Last 24 Hrs  T(C): 36.6 (15 Feb 2025 08:00), Max: 36.9 (14 Feb 2025 20:00)  T(F): 97.9 (15 Feb 2025 08:00), Max: 98.4 (14 Feb 2025 20:00)  HR: 83 (15 Feb 2025 11:00) (68 - 99)  BP: 173/101 (15 Feb 2025 11:00) (147/92 - 182/105)  BP(mean): 122 (15 Feb 2025 11:00) (102 - 135)  RR: 23 (15 Feb 2025 11:00) (13 - 28)  SpO2: 94% (15 Feb 2025 11:00) (91% - 99%)    Parameters below as of 15 Feb 2025 07:00  Patient On (Oxygen Delivery Method): room air      I&O's Detail    14 Feb 2025 07:01  -  15 Feb 2025 07:00  --------------------------------------------------------  IN:    Lactated Ringers: 90 mL    Oral Fluid: 1700 mL  Total IN: 1790 mL    OUT:    Chest Tube (mL): 230 mL    Voided (mL): 1625 mL  Total OUT: 1855 mL    Total NET: -65 mL      15 Feb 2025 07:01  -  15 Feb 2025 12:01  --------------------------------------------------------  IN:    Oral Fluid: 300 mL  Total IN: 300 mL    OUT:    Chest Tube (mL): 5 mL  Total OUT: 5 mL    Total NET: 295 mL      CAPILLARY BLOOD GLUCOSE      Home Medications:  losartan 50 mg oral tablet: 1 tab(s) orally once a day AM (13 Feb 2025 06:22)  metoprolol: 25 milligram(s) orally once a day (13 Feb 2025 06:22)  tadalafil 20 mg oral tablet: 1 tab(s) orally once a day (13 Feb 2025 06:22)  Trelegy Ellipta 200 mcg-62.5 mcg-25 mcg/inh inhalation powder: 1 puff(s) inhaled once a day (13 Feb 2025 06:22)    MEDICATIONS  (STANDING):  acetaminophen   IVPB .. 1000 milliGRAM(s) IV Intermittent once  chlorhexidine 2% Cloths 1 Application(s) Topical daily  fluticasone propionate/ salmeterol 100-50 MICROgram(s) Diskus 1 Dose(s) Inhalation two times a day  heparin   Injectable 7500 Unit(s) SubCutaneous every 8 hours  labetalol 200 milliGRAM(s) Oral every 8 hours  lidocaine   4% Patch 1 Patch Transdermal every 24 hours  losartan 50 milliGRAM(s) Oral daily  pantoprazole  Injectable 40 milliGRAM(s) IV Push daily  polyethylene glycol 3350 17 Gram(s) Oral daily  senna 2 Tablet(s) Oral at bedtime  tiotropium 2.5 MICROgram(s) Inhaler 2 Puff(s) Inhalation daily    MEDICATIONS  (PRN):  ALPRAZolam 0.5 milliGRAM(s) Oral every 8 hours PRN anxiety  HYDROmorphone  Injectable 0.5 milliGRAM(s) IV Push every 4 hours PRN Severe Breakthrough Pain (7 - 10)  naloxone Injectable 0.1 milliGRAM(s) IV Push every 3 minutes PRN For ANY of the following changes in patient status:  A. RR LESS THAN 10 breaths per minute, B. Oxygen saturation LESS THAN 90%, C. Sedation score of 6  ondansetron Injectable 4 milliGRAM(s) IV Push every 6 hours PRN Nausea  oxyCODONE    IR 5 milliGRAM(s) Oral every 3 hours PRN Moderate Pain (4 - 6)  oxyCODONE    IR 10 milliGRAM(s) Oral every 3 hours PRN Severe Pain (7 - 10)  sodium chloride 3%  Inhalation 4 milliLiter(s) Inhalation every 8 hours PRN Sputum Induction      Culture - Nocardia (collected 14 Feb 2025 16:38)  Source: Bronchial  Preliminary Report (15 Feb 2025 08:05):    Testing in progress    Culture - Acid Fast - Sputum w/Smear (collected 14 Feb 2025 07:20)  Source: .Sputum Sputum    Culture - Acid Fast - Sputum w/Smear (collected 14 Feb 2025 06:00)  Source: .Sputum Sputum    Culture - Acid Fast - Sputum w/Smear (collected 13 Feb 2025 15:35)  Source: .Sputum Sputum    Culture - Acid Fast - Tissue w/Smear (collected 13 Feb 2025 11:12)  Source: Tissue  Preliminary Report (13 Feb 2025 22:11):    Culture is being performed.    Culture - Fungal, Tissue (collected 13 Feb 2025 11:12)  Source: Tissue  Preliminary Report (15 Feb 2025 10:55):    No growth    Culture - Tissue with Gram Stain (collected 13 Feb 2025 11:12)  Source: Tissue  Gram Stain (13 Feb 2025 13:48):    No polymorphonuclear leukocytes per low power field    No organisms seen per oil power field  Preliminary Report (14 Feb 2025 15:10):    No growth to date      Physical exam:                 General:               Pt is awake, alert,  appears to be in pain but not in distress                                                  Neuro:                  Nonfocal                             Psych:                   A&Ox3                          Cardiovascular:   S1 & S2, regular                           Respiratory:         Air entry is fair and equal on both sides, has bilateral conducted sounds                           GI:                          Soft, nondistended and nontender, Bowel sounds active                            Ext:                        No cyanosis or edema                           Labs:                                                                           16.2   9.14  )-----------( 179      ( 15 Feb 2025 03:45 )             49.0             02-15    137  |  100  |  17  ----------------------------<  99  4.2   |  23  |  1.05    Ca    8.7      15 Feb 2025 03:45  Phos  3.2     02-15  Mg     2.00     02-15    TPro  4.9[L]  /  Alb  3.3  /  TBili  0.4  /  DBili  x   /  AST  9   /  ALT  9   /  AlkPhos  68  02-14                    LIVER FUNCTIONS - ( 14 Feb 2025 03:00 )  Alb: 3.3 g/dL / Pro: 4.9 g/dL / ALK PHOS: 68 U/L / ALT: 9 U/L / AST: 9 U/L / GGT: x           Urinalysis Basic - ( 15 Feb 2025 03:45 )    Color: x / Appearance: x / SG: x / pH: x  Gluc: 99 mg/dL / Ketone: x  / Bili: x / Urobili: x   Blood: x / Protein: x / Nitrite: x   Leuk Esterase: x / RBC: x / WBC x   Sq Epi: x / Non Sq Epi: x / Bacteria: x        Culture - Nocardia (collected 14 Feb 2025 16:38)  Source: Bronchial  Preliminary Report (15 Feb 2025 08:05):    Testing in progress    Culture - Acid Fast - Sputum w/Smear (collected 14 Feb 2025 07:20)  Source: .Sputum Sputum    Culture - Acid Fast - Sputum w/Smear (collected 14 Feb 2025 06:00)  Source: .Sputum Sputum    Culture - Acid Fast - Sputum w/Smear (collected 13 Feb 2025 15:35)  Source: .Sputum Sputum    Culture - Acid Fast - Tissue w/Smear (collected 13 Feb 2025 11:12)  Source: Tissue  Preliminary Report (13 Feb 2025 22:11):    Culture is being performed.    Culture - Fungal, Tissue (collected 13 Feb 2025 11:12)  Source: Tissue  Preliminary Report (15 Feb 2025 10:55):    No growth    Culture - Tissue with Gram Stain (collected 13 Feb 2025 11:12)  Source: Tissue  Gram Stain (13 Feb 2025 13:48):    No polymorphonuclear leukocytes per low power field    No organisms seen per oil power field  Preliminary Report (14 Feb 2025 15:10):    No growth to date      CXR:    < from: Xray Chest 1 View- PORTABLE-Routine (Xray Chest 1 View- PORTABLE-Routine in AM.) (02.14.25 @ 06:05) >  IMPRESSION: Post left thoracotomy with chest tube and apical pneumothorax.    Plan:  General:  54yMale s/p Flexible bronchoscopy, Left uniportal VATS, Left upper lobe wedge resection and mediastinal lymph node biopsy  2/13/25   , experiencing  pain with deep breathing.                             Neuro:                                         Pain control with Oxycodone  /  Tylenol PRN / Lidopatch                            Cardiovascular:                                          Telemetry (medical test) - Reviewed by me today independently. Pt is in normal sinus rhythm .                                          HTN: Continue hemodynamic monitoring and Labetalol / Losartan                                         Continue hemodynamic monitoring to prevent decompensation.                            Respiratory:                                         Postop hypoxemia - Resolved                                             CXR is clear. Encourage incentive spirometry.                                                   Chest PT and frequent suctioning.                                          Necrotizing granuloma - In isolation,  sputum 1/ 3 positive for AFB  Awaiting PCR results     COPD: Continue bronchodilators, inhaled steroids, Pulmozyme and inhaled 3% saline inhalations.                                         OOB to chair & ambulate w/ assistance.                                          Continuous pulse oximetry for support & to prevent decompensation.                                         Monitor chest tube output                                         Chest tube to water seal                                                                                            GI                                         On puree diet, advance to  regular diet as tolerated                                         Continue G.I prophylaxis with Protonix                                         Continue Zofran / Reglan for nausea - PRN                                         Continue bowel regimen	                                                                 Renal:     RCC s/p Right nephrectomy and partial L-nephrectomy     Avoid hypotension and nephrotoxic agents     OK to have SBP in 120-160s                                         Continue LR  30cc/hr                                         Monitor I/Os and electrolytes                                                                                        Hem/ Onc:                                         DVT prophylaxis with SQ Heparin and SCDs                                         Monitor chest tube output &  signs of bleeding.                                          Follow CBC in AM                           Infectious disease:                                            Monitor for fever / leukocytosis.                                          All surgical incision / chest tube  sites look clean                            Endocrine                                            Prediabetes: Continue Accu-Checks with coverage                                                Pertinent clinical, laboratory, radiographic, hemodynamic, echocardiographic, respiratory data, microbiologic data and chart were reviewed and analyzed frequently throughout the course of the day and night. Patient seen, examined and plan discussed with CT Surgeon Dr. Iraheta / CTICU team during rounds.  OOB to chair and ambulate with physical therapy as tolerated.   Status discussed with patient and updated plan of care.   I have spent 50 minutes with this patient  monitoring hemodynamic status, respiratory status and  coordinating care in the ICU.        Dave Coleman MD

## 2025-02-16 LAB
ANION GAP SERPL CALC-SCNC: 12 MMOL/L — SIGNIFICANT CHANGE UP (ref 7–14)
BUN SERPL-MCNC: 18 MG/DL — SIGNIFICANT CHANGE UP (ref 7–23)
CALCIUM SERPL-MCNC: 8.9 MG/DL — SIGNIFICANT CHANGE UP (ref 8.4–10.5)
CHLORIDE SERPL-SCNC: 101 MMOL/L — SIGNIFICANT CHANGE UP (ref 98–107)
CO2 SERPL-SCNC: 24 MMOL/L — SIGNIFICANT CHANGE UP (ref 22–31)
CREAT SERPL-MCNC: 1.17 MG/DL — SIGNIFICANT CHANGE UP (ref 0.5–1.3)
DEPRECATED M TB RPOB XXX QL NAA+PROBE: SIGNIFICANT CHANGE UP
EGFR: 74 ML/MIN/1.73M2 — SIGNIFICANT CHANGE UP
GLUCOSE SERPL-MCNC: 96 MG/DL — SIGNIFICANT CHANGE UP (ref 70–99)
HCT VFR BLD CALC: 46.2 % — SIGNIFICANT CHANGE UP (ref 39–50)
HGB BLD-MCNC: 15.2 G/DL — SIGNIFICANT CHANGE UP (ref 13–17)
M TB CMPLX DNA SPT/BRO QL NAA+PROBE: SIGNIFICANT CHANGE UP
M TB CMPLX DNA SPT/BRO QL NAA+PROBE: SIGNIFICANT CHANGE UP
MAGNESIUM SERPL-MCNC: 2 MG/DL — SIGNIFICANT CHANGE UP (ref 1.6–2.6)
MCHC RBC-ENTMCNC: 29.9 PG — SIGNIFICANT CHANGE UP (ref 27–34)
MCHC RBC-ENTMCNC: 32.9 G/DL — SIGNIFICANT CHANGE UP (ref 32–36)
MCV RBC AUTO: 90.8 FL — SIGNIFICANT CHANGE UP (ref 80–100)
MTB RIF RES GENE SPT NAA+PROBE: SIGNIFICANT CHANGE UP
NRBC # BLD AUTO: 0 K/UL — SIGNIFICANT CHANGE UP (ref 0–0)
NRBC # FLD: 0 K/UL — SIGNIFICANT CHANGE UP (ref 0–0)
NRBC BLD AUTO-RTO: 0 /100 WBCS — SIGNIFICANT CHANGE UP (ref 0–0)
PHOSPHATE SERPL-MCNC: 3.5 MG/DL — SIGNIFICANT CHANGE UP (ref 2.5–4.5)
PLATELET # BLD AUTO: 183 K/UL — SIGNIFICANT CHANGE UP (ref 150–400)
POTASSIUM SERPL-MCNC: 4 MMOL/L — SIGNIFICANT CHANGE UP (ref 3.5–5.3)
POTASSIUM SERPL-SCNC: 4 MMOL/L — SIGNIFICANT CHANGE UP (ref 3.5–5.3)
RBC # BLD: 5.09 M/UL — SIGNIFICANT CHANGE UP (ref 4.2–5.8)
RBC # FLD: 14.1 % — SIGNIFICANT CHANGE UP (ref 10.3–14.5)
SODIUM SERPL-SCNC: 137 MMOL/L — SIGNIFICANT CHANGE UP (ref 135–145)
WBC # BLD: 7.58 K/UL — SIGNIFICANT CHANGE UP (ref 3.8–10.5)
WBC # FLD AUTO: 7.58 K/UL — SIGNIFICANT CHANGE UP (ref 3.8–10.5)

## 2025-02-16 PROCEDURE — 71045 X-RAY EXAM CHEST 1 VIEW: CPT | Mod: 26

## 2025-02-16 PROCEDURE — 99233 SBSQ HOSP IP/OBS HIGH 50: CPT

## 2025-02-16 RX ORDER — ACETAMINOPHEN 500 MG/5ML
975 LIQUID (ML) ORAL EVERY 6 HOURS
Refills: 0 | Status: DISCONTINUED | OUTPATIENT
Start: 2025-02-16 | End: 2025-02-23

## 2025-02-16 RX ADMIN — OXYCODONE HYDROCHLORIDE 10 MILLIGRAM(S): 30 TABLET ORAL at 18:30

## 2025-02-16 RX ADMIN — HEPARIN SODIUM 7500 UNIT(S): 1000 INJECTION INTRAVENOUS; SUBCUTANEOUS at 14:18

## 2025-02-16 RX ADMIN — OXYCODONE HYDROCHLORIDE 10 MILLIGRAM(S): 30 TABLET ORAL at 13:00

## 2025-02-16 RX ADMIN — HEPARIN SODIUM 7500 UNIT(S): 1000 INJECTION INTRAVENOUS; SUBCUTANEOUS at 21:32

## 2025-02-16 RX ADMIN — OXYCODONE HYDROCHLORIDE 10 MILLIGRAM(S): 30 TABLET ORAL at 17:54

## 2025-02-16 RX ADMIN — Medication 1 DOSE(S): at 09:11

## 2025-02-16 RX ADMIN — Medication 1 APPLICATION(S): at 11:22

## 2025-02-16 RX ADMIN — OXYCODONE HYDROCHLORIDE 10 MILLIGRAM(S): 30 TABLET ORAL at 23:48

## 2025-02-16 RX ADMIN — HEPARIN SODIUM 7500 UNIT(S): 1000 INJECTION INTRAVENOUS; SUBCUTANEOUS at 05:49

## 2025-02-16 RX ADMIN — LABETALOL HYDROCHLORIDE 200 MILLIGRAM(S): 200 TABLET, FILM COATED ORAL at 05:49

## 2025-02-16 RX ADMIN — LIDOCAINE HYDROCHLORIDE 1 PATCH: 20 JELLY TOPICAL at 20:00

## 2025-02-16 RX ADMIN — LABETALOL HYDROCHLORIDE 200 MILLIGRAM(S): 200 TABLET, FILM COATED ORAL at 14:15

## 2025-02-16 RX ADMIN — LABETALOL HYDROCHLORIDE 200 MILLIGRAM(S): 200 TABLET, FILM COATED ORAL at 21:32

## 2025-02-16 RX ADMIN — OXYCODONE HYDROCHLORIDE 10 MILLIGRAM(S): 30 TABLET ORAL at 22:48

## 2025-02-16 RX ADMIN — Medication 2 TABLET(S): at 21:32

## 2025-02-16 RX ADMIN — OXYCODONE HYDROCHLORIDE 10 MILLIGRAM(S): 30 TABLET ORAL at 04:39

## 2025-02-16 RX ADMIN — LOSARTAN POTASSIUM 50 MILLIGRAM(S): 100 TABLET, FILM COATED ORAL at 05:49

## 2025-02-16 RX ADMIN — LIDOCAINE HYDROCHLORIDE 1 PATCH: 20 JELLY TOPICAL at 14:13

## 2025-02-16 RX ADMIN — OXYCODONE HYDROCHLORIDE 10 MILLIGRAM(S): 30 TABLET ORAL at 01:30

## 2025-02-16 RX ADMIN — OXYCODONE HYDROCHLORIDE 10 MILLIGRAM(S): 30 TABLET ORAL at 12:18

## 2025-02-16 RX ADMIN — OXYCODONE HYDROCHLORIDE 10 MILLIGRAM(S): 30 TABLET ORAL at 04:09

## 2025-02-16 RX ADMIN — Medication 40 MILLIGRAM(S): at 11:25

## 2025-02-16 RX ADMIN — Medication 1 DOSE(S): at 21:58

## 2025-02-16 NOTE — PROGRESS NOTE ADULT - SUBJECTIVE AND OBJECTIVE BOX
YAYA MARIA                     MRN-[id number]    HPI:  54 year old male, current smoker, w/ hx of HTN, COPD, GERD, s/p gastric sleeve (2023 at [facility]), Renal Cell Carcinoma 2021 s/p Left nephrectomy and partial right nephrectomy. Presents with incidental findings of  multiple FDG avid lung nodules and lymphadenopathy. Scheduled for flexible bronchoscopy left video assisted thorascopic surgery, lung resection, possible mediastinal lymph node dissection  (07 Feb 2025 08:44)    CHIEF COMPLAINT: FOLLOW UP IN ICU FOR POSTOPERATIVE CARE OF PATIENT WHO IS S/P Left upper lobe wedge resection       PROCEDURES:     Flexible bronchoscopy, Left uniportal VATS, Left upper lobe wedge resection and mediastinal lymph node biopsy  2/13/25     ISSUES:     Lung nodule              Postop pain              Chest tube in place              At risk for respiratory complications  Morbid obesity  Cigarette smoker  HTN (hypertension)  GERD (gastroesophageal reflux disease)  History of prediabetes  Deviated nasal septum  Malignant neoplasm of unspecified kidney, except renal pelvis  Renal cell cancer s/p S/p nephrectomy right 9/3/2021  COPD (chronic obstructive pulmonary disease)  H/O partial nephrectomy - L  S/P gastric sleeve procedure    PAST MEDICAL & SURGICAL HISTORY:  Morbid obesity      Cigarette smoker      HTN (hypertension)      GERD (gastroesophageal reflux disease)      History of prediabetes      Deviated nasal septum      Malignant neoplasm of unspecified kidney, except renal pelvis      Renal cell cancer      Other nonspecific abnormal finding of lung field      COPD (chronic obstructive pulmonary disease)      H/O colonoscopy      S/p nephrectomy  right 9/3/2021      H/O partial nephrectomy      S/P gastric sleeve procedure                VITAL SIGNS:  Vital Signs Last 24 Hrs  T(C): 36.9 (16 Feb 2025 08:00), Max: 36.9 (16 Feb 2025 08:00)  T(F): 98.4 (16 Feb 2025 08:00), Max: 98.4 (16 Feb 2025 08:00)  HR: 65 (16 Feb 2025 09:11) (65 - 93)  BP: 140/76 (16 Feb 2025 08:00) (134/71 - 173/101)  BP(mean): 94 (16 Feb 2025 08:00) (83 - 122)  RR: 26 (16 Feb 2025 08:00) (18 - 27)  SpO2: 94% (16 Feb 2025 09:11) (91% - 95%)    Parameters below as of 16 Feb 2025 09:11  Patient On (Oxygen Delivery Method): room air        I/Os:   I&O's Detail    15 Feb 2025 07:01  -  16 Feb 2025 07:00  --------------------------------------------------------  IN:    Oral Fluid: 1000 mL  Total IN: 1000 mL    OUT:    Chest Tube (mL): 70 mL    Voided (mL): 1325 mL  Total OUT: 1395 mL    Total NET: -395 mL      16 Feb 2025 07:01  -  16 Feb 2025 10:57  --------------------------------------------------------  IN:  Total IN: 0 mL    OUT:    Chest Tube (mL): 0 mL    Voided (mL): 200 mL  Total OUT: 200 mL    Total NET: -200 mL          CAPILLARY BLOOD GLUCOSE          =======================MEDICATIONS===================  MEDICATIONS  (STANDING):  chlorhexidine 2% Cloths 1 Application(s) Topical daily  fluticasone propionate/ salmeterol 100-50 MICROgram(s) Diskus 1 Dose(s) Inhalation two times a day  heparin   Injectable 7500 Unit(s) SubCutaneous every 8 hours  labetalol 200 milliGRAM(s) Oral every 8 hours  lidocaine   4% Patch 1 Patch Transdermal every 24 hours  losartan 50 milliGRAM(s) Oral daily  pantoprazole  Injectable 40 milliGRAM(s) IV Push daily  polyethylene glycol 3350 17 Gram(s) Oral daily  PPD  5 Tuberculin Unit(s) Injectable 5 Unit(s) IntraDermal once  senna 2 Tablet(s) Oral at bedtime    MEDICATIONS  (PRN):  acetaminophen     Tablet .. 975 milliGRAM(s) Oral every 6 hours PRN Mild Pain (1 - 3)  ALPRAZolam 0.5 milliGRAM(s) Oral every 8 hours PRN anxiety  naloxone Injectable 0.1 milliGRAM(s) IV Push every 3 minutes PRN For ANY of the following changes in patient status:  A. RR LESS THAN 10 breaths per minute, B. Oxygen saturation LESS THAN 90%, C. Sedation score of 6  ondansetron Injectable 4 milliGRAM(s) IV Push every 6 hours PRN Nausea  oxyCODONE    IR 5 milliGRAM(s) Oral every 3 hours PRN Moderate Pain (4 - 6)  oxyCODONE    IR 10 milliGRAM(s) Oral every 3 hours PRN Severe Pain (7 - 10)  sodium chloride 3%  Inhalation 4 milliLiter(s) Inhalation every 8 hours PRN Sputum Induction    PHYSICAL EXAM============================  General:                         Awake, alert, not in any distress  Neuro:                            Moving all extremities to commands.   Respiratory:	Air entry fair and  bilateral conducted sounds                                           Effort even and unlabored.  CV:		Regular rate and rhythm. Normal S1/S2                                          Distal pulses present.  Abdomen:	                     Soft, non-distended. Bowel sounds present   Skin:		No rash.  Extremities:	Warm, no cyanosis or edema.  Palpable pulses    ============================LABS=========================                        15.2   7.58  )-----------( 183      ( 16 Feb 2025 05:30 )             46.2     02-16    137  |  101  |  18  ----------------------------<  96  4.0   |  24  |  1.17    Ca    8.9      16 Feb 2025 05:30  Phos  3.5     02-16  Mg     2.00     02-16      Urinalysis Basic - ( 16 Feb 2025 05:30 )    Color: x / Appearance: x / SG: x / pH: x  Gluc: 96 mg/dL / Ketone: x  / Bili: x / Urobili: x   Blood: x / Protein: x / Nitrite: x   Leuk Esterase: x / RBC: x / WBC x   Sq Epi: x / Non Sq Epi: x / Bacteria: x      ASSESSMENT AND PLAN:     NEURO:  Post-operative Pain - Stable. Pain control with oxycodone and Tylenol IV PRN.          RESPIRATORY:  Hypoxia - Wean nasal cannula for goal O2sat above 92. Obtain CXR. Incentive spirometry. Chest PT and frequent suctioning. Continue bronchodilators. OOB to chair & ambulate w/ assistance. Continuous pulse oximetry for support & to prevent decompensation.  Chest tube – Pleurevac regulated suctioning. + air leak, Monitor chest tube output.       CARDIOVASCULAR:  Hemodynamically stable - Not on pressors. Continue hemodynamic monitoring.  Telemetry (medical test) - Reviewed by me today independently. Normal sinus rhythm.  HTN - restart home ARB, continue metoprolol       RENAL:  H/o left and partial right nephrectomy, Stable - Monitor IOs and electrolytes. Keep K above 4.0 and Mg above 2.0.          GASTROINTESTINAL:  GI prophylaxis not indicated  Zofran and Reglan IV PRN for nausea  Regular consistency diet         HEMATOLOGIC:  No signs of active bleeding. Monitor Hgb in CBC in AM  DVT prophylaxis with heparin subQ and SCDs.         INFECTIOUS DISEASE:  All surgical sites appear clean. No signs of active infection. Will monitor for fever and leukocytosis. F/u tissue and sputum AFb     ENDOCRINE:  Stable – Monitor glucose fingersticks for goal 120-180.           ONCOLOGY:  Lung nodule - Improved. S/P resection. Follow up final pathology.      Pertinent clinical, laboratory, radiographic, hemodynamic, echocardiographic, respiratory data, microbiologic data and chart were reviewed by myself and analyzed frequently throughout the course of the day and night by myself.    Plan discussed at length with the CTICU staff and Attending CT Surgeon -   Dr Corky Iraheta    Patient's status was discussed with patient at bedside.    I have spent 50 minutes of time with this patient monitoring hemodynamic status, respiratory status, and coordinating care in the ICU.    Kayla Harrell DO FACEP

## 2025-02-17 LAB
ANION GAP SERPL CALC-SCNC: 12 MMOL/L — SIGNIFICANT CHANGE UP (ref 7–14)
BLD GP AB SCN SERPL QL: NEGATIVE — SIGNIFICANT CHANGE UP
BUN SERPL-MCNC: 18 MG/DL — SIGNIFICANT CHANGE UP (ref 7–23)
CALCIUM SERPL-MCNC: 8.8 MG/DL — SIGNIFICANT CHANGE UP (ref 8.4–10.5)
CHLORIDE SERPL-SCNC: 101 MMOL/L — SIGNIFICANT CHANGE UP (ref 98–107)
CO2 SERPL-SCNC: 23 MMOL/L — SIGNIFICANT CHANGE UP (ref 22–31)
CREAT SERPL-MCNC: 1.13 MG/DL — SIGNIFICANT CHANGE UP (ref 0.5–1.3)
EGFR: 77 ML/MIN/1.73M2 — SIGNIFICANT CHANGE UP
GLUCOSE SERPL-MCNC: 104 MG/DL — HIGH (ref 70–99)
HCT VFR BLD CALC: 46.8 % — SIGNIFICANT CHANGE UP (ref 39–50)
HGB BLD-MCNC: 14.9 G/DL — SIGNIFICANT CHANGE UP (ref 13–17)
MAGNESIUM SERPL-MCNC: 2 MG/DL — SIGNIFICANT CHANGE UP (ref 1.6–2.6)
MCHC RBC-ENTMCNC: 28.9 PG — SIGNIFICANT CHANGE UP (ref 27–34)
MCHC RBC-ENTMCNC: 31.8 G/DL — LOW (ref 32–36)
MCV RBC AUTO: 90.9 FL — SIGNIFICANT CHANGE UP (ref 80–100)
NRBC # BLD AUTO: 0 K/UL — SIGNIFICANT CHANGE UP (ref 0–0)
NRBC # FLD: 0 K/UL — SIGNIFICANT CHANGE UP (ref 0–0)
NRBC BLD AUTO-RTO: 0 /100 WBCS — SIGNIFICANT CHANGE UP (ref 0–0)
PHOSPHATE SERPL-MCNC: 3.5 MG/DL — SIGNIFICANT CHANGE UP (ref 2.5–4.5)
PLATELET # BLD AUTO: 173 K/UL — SIGNIFICANT CHANGE UP (ref 150–400)
POTASSIUM SERPL-MCNC: 4.3 MMOL/L — SIGNIFICANT CHANGE UP (ref 3.5–5.3)
POTASSIUM SERPL-SCNC: 4.3 MMOL/L — SIGNIFICANT CHANGE UP (ref 3.5–5.3)
RBC # BLD: 5.15 M/UL — SIGNIFICANT CHANGE UP (ref 4.2–5.8)
RBC # FLD: 14.2 % — SIGNIFICANT CHANGE UP (ref 10.3–14.5)
RH IG SCN BLD-IMP: POSITIVE — SIGNIFICANT CHANGE UP
SODIUM SERPL-SCNC: 136 MMOL/L — SIGNIFICANT CHANGE UP (ref 135–145)
WBC # BLD: 7.45 K/UL — SIGNIFICANT CHANGE UP (ref 3.8–10.5)
WBC # FLD AUTO: 7.45 K/UL — SIGNIFICANT CHANGE UP (ref 3.8–10.5)

## 2025-02-17 PROCEDURE — 71045 X-RAY EXAM CHEST 1 VIEW: CPT | Mod: 26

## 2025-02-17 PROCEDURE — 99233 SBSQ HOSP IP/OBS HIGH 50: CPT

## 2025-02-17 RX ORDER — BISACODYL 5 MG
5 TABLET, DELAYED RELEASE (ENTERIC COATED) ORAL AT BEDTIME
Refills: 0 | Status: DISCONTINUED | OUTPATIENT
Start: 2025-02-17 | End: 2025-02-23

## 2025-02-17 RX ORDER — ACETAMINOPHEN 500 MG/5ML
1000 LIQUID (ML) ORAL ONCE
Refills: 0 | Status: COMPLETED | OUTPATIENT
Start: 2025-02-17 | End: 2025-02-17

## 2025-02-17 RX ADMIN — OXYCODONE HYDROCHLORIDE 10 MILLIGRAM(S): 30 TABLET ORAL at 02:18

## 2025-02-17 RX ADMIN — OXYCODONE HYDROCHLORIDE 10 MILLIGRAM(S): 30 TABLET ORAL at 23:00

## 2025-02-17 RX ADMIN — HEPARIN SODIUM 7500 UNIT(S): 1000 INJECTION INTRAVENOUS; SUBCUTANEOUS at 22:02

## 2025-02-17 RX ADMIN — Medication 1 APPLICATION(S): at 12:43

## 2025-02-17 RX ADMIN — Medication 5 MILLIGRAM(S): at 22:02

## 2025-02-17 RX ADMIN — LABETALOL HYDROCHLORIDE 200 MILLIGRAM(S): 200 TABLET, FILM COATED ORAL at 05:58

## 2025-02-17 RX ADMIN — HEPARIN SODIUM 7500 UNIT(S): 1000 INJECTION INTRAVENOUS; SUBCUTANEOUS at 05:56

## 2025-02-17 RX ADMIN — OXYCODONE HYDROCHLORIDE 10 MILLIGRAM(S): 30 TABLET ORAL at 02:48

## 2025-02-17 RX ADMIN — OXYCODONE HYDROCHLORIDE 10 MILLIGRAM(S): 30 TABLET ORAL at 14:45

## 2025-02-17 RX ADMIN — LABETALOL HYDROCHLORIDE 200 MILLIGRAM(S): 200 TABLET, FILM COATED ORAL at 14:15

## 2025-02-17 RX ADMIN — HEPARIN SODIUM 7500 UNIT(S): 1000 INJECTION INTRAVENOUS; SUBCUTANEOUS at 14:14

## 2025-02-17 RX ADMIN — LIDOCAINE HYDROCHLORIDE 1 PATCH: 20 JELLY TOPICAL at 12:45

## 2025-02-17 RX ADMIN — OXYCODONE HYDROCHLORIDE 10 MILLIGRAM(S): 30 TABLET ORAL at 18:27

## 2025-02-17 RX ADMIN — LIDOCAINE HYDROCHLORIDE 1 PATCH: 20 JELLY TOPICAL at 19:00

## 2025-02-17 RX ADMIN — LABETALOL HYDROCHLORIDE 200 MILLIGRAM(S): 200 TABLET, FILM COATED ORAL at 22:03

## 2025-02-17 RX ADMIN — LOSARTAN POTASSIUM 50 MILLIGRAM(S): 100 TABLET, FILM COATED ORAL at 05:58

## 2025-02-17 RX ADMIN — POLYETHYLENE GLYCOL 3350 17 GRAM(S): 17 POWDER, FOR SOLUTION ORAL at 12:42

## 2025-02-17 RX ADMIN — Medication 1 DOSE(S): at 21:48

## 2025-02-17 RX ADMIN — Medication 1000 MILLIGRAM(S): at 16:15

## 2025-02-17 RX ADMIN — Medication 1 DOSE(S): at 09:06

## 2025-02-17 RX ADMIN — Medication 2 TABLET(S): at 22:03

## 2025-02-17 RX ADMIN — OXYCODONE HYDROCHLORIDE 10 MILLIGRAM(S): 30 TABLET ORAL at 14:14

## 2025-02-17 RX ADMIN — LIDOCAINE HYDROCHLORIDE 1 PATCH: 20 JELLY TOPICAL at 02:26

## 2025-02-17 RX ADMIN — Medication 400 MILLIGRAM(S): at 15:56

## 2025-02-17 RX ADMIN — Medication 40 MILLIGRAM(S): at 12:43

## 2025-02-17 NOTE — PROCEDURE NOTE - ADDITIONAL PROCEDURE DETAILS
100ml of patients own venous blood used for autologous blood patch.   Blood injected via left chest tube, flushed with 30ml of saline and elevated x2hrs.

## 2025-02-17 NOTE — PROGRESS NOTE ADULT - SUBJECTIVE AND OBJECTIVE BOX
YAYA MARIA      54y   Male   MRN-[id number]         No Known Allergies             Daily     Daily Weight in k (2025 00:00)Drug Dosing Weight  Height (cm): 180.3 (2025 06:06)  Weight (kg): 122.5 (2025 06:06)  BMI (kg/m2): 37.7 (2025 06:06)  BSA (m2): 2.4 (2025 06:06)    54 year old male, current smoker, w/ hx of HTN, COPD, GERD, s/p gastric sleeve ( at [facility]), Renal Cell Carcinoma  s/p Left nephrectomy and partial right nephrectomy. Presents with incidental findings of  multiple FDG avid lung nodules and lymphadenopathy. Scheduled for flexible bronchoscopy left video assisted thorascopic surgery, lung resection, possible mediastinal lymph node dissection  (2025 08:44)      CHIEF COMPLAINT: Follow up in ICU  for postoperative care of patient who is s/p lung surgery    Procedure: Flexible bronchoscopy, Left uniportal VATS, Left upper lobe wedge resection and mediastinal lymph node biopsy  25                     Issues:  Necrotizing granuloma              Lung nodule              Postop pain              Chest tube in place              At risk for respiratory complications              At risk for PCA infusion related complications  Morbid obesity  Cigarette smoker  HTN (hypertension)  GERD (gastroesophageal reflux disease)  History of prediabetes  Deviated nasal septum  Malignant neoplasm of unspecified kidney, except renal pelvis  Renal cell cancer s/p S/p nephrectomy right 9/3/2021  COPD (chronic obstructive pulmonary disease)  H/O partial nephrectomy - L  S/P gastric sleeve procedure    Postop course:     Patient reports moderate pain at chest wall incision sites which is worse with coughing and deep breathing without associated fever or dyspnea. Pain is improved with use of PCA and  oral pain meds.         Home Medications:  losartan 50 mg oral tablet: 1 tab(s) orally once a day AM (2025 06:22)  metoprolol: 25 milligram(s) orally once a day (2025 06:22)  tadalafil 20 mg oral tablet: 1 tab(s) orally once a day (2025 06:22)  Trelegy Ellipta 200 mcg-62.5 mcg-25 mcg/inh inhalation powder: 1 puff(s) inhaled once a day (2025 06:22)    PAST MEDICAL & SURGICAL HISTORY:  Morbid obesity      Cigarette smoker      HTN (hypertension)      GERD (gastroesophageal reflux disease)      History of prediabetes      Deviated nasal septum      Malignant neoplasm of unspecified kidney, except renal pelvis      Renal cell cancer      Other nonspecific abnormal finding of lung field      COPD (chronic obstructive pulmonary disease)      H/O colonoscopy      S/p nephrectomy  right 9/3/2021      H/O partial nephrectomy      S/P gastric sleeve procedure        Vital Signs Last 24 Hrs  T(C): 36.7 (2025 08:13), Max: 36.8 (2025 16:00)  T(F): 98 (2025 08:13), Max: 98.2 (2025 16:00)  HR: 66 (2025 08:13) (66 - 76)  BP: 144/83 (2025 08:13) (126/75 - 151/94)  BP(mean): 99 (2025 08:13) (89 - 109)  RR: 20 (2025 08:13) (13 - 23)  SpO2: 94% (2025 08:13) (94% - 97%)    Parameters below as of 2025 08:13  Patient On (Oxygen Delivery Method): room air      I&O's Detail    2025 07:01  -  2025 07:00  --------------------------------------------------------  IN:    Oral Fluid: 700 mL  Total IN: 700 mL    OUT:    Chest Tube (mL): 140 mL    Voided (mL): 1600 mL  Total OUT: 1740 mL    Total NET: -1040 mL        CAPILLARY BLOOD GLUCOSE        Home Medications:  losartan 50 mg oral tablet: 1 tab(s) orally once a day AM (2025 06:22)  metoprolol: 25 milligram(s) orally once a day (2025 06:22)  tadalafil 20 mg oral tablet: 1 tab(s) orally once a day (2025 06:22)  Trelegy Ellipta 200 mcg-62.5 mcg-25 mcg/inh inhalation powder: 1 puff(s) inhaled once a day (2025 06:22)    MEDICATIONS  (STANDING):  chlorhexidine 2% Cloths 1 Application(s) Topical daily  fluticasone propionate/ salmeterol 100-50 MICROgram(s) Diskus 1 Dose(s) Inhalation two times a day  heparin   Injectable 7500 Unit(s) SubCutaneous every 8 hours  labetalol 200 milliGRAM(s) Oral every 8 hours  lidocaine   4% Patch 1 Patch Transdermal every 24 hours  losartan 50 milliGRAM(s) Oral daily  pantoprazole  Injectable 40 milliGRAM(s) IV Push daily  polyethylene glycol 3350 17 Gram(s) Oral daily  senna 2 Tablet(s) Oral at bedtime    MEDICATIONS  (PRN):  acetaminophen     Tablet .. 975 milliGRAM(s) Oral every 6 hours PRN Mild Pain (1 - 3)  ALPRAZolam 0.5 milliGRAM(s) Oral every 8 hours PRN anxiety  naloxone Injectable 0.1 milliGRAM(s) IV Push every 3 minutes PRN For ANY of the following changes in patient status:  A. RR LESS THAN 10 breaths per minute, B. Oxygen saturation LESS THAN 90%, C. Sedation score of 6  ondansetron Injectable 4 milliGRAM(s) IV Push every 6 hours PRN Nausea  oxyCODONE    IR 5 milliGRAM(s) Oral every 3 hours PRN Moderate Pain (4 - 6)  oxyCODONE    IR 10 milliGRAM(s) Oral every 3 hours PRN Severe Pain (7 - 10)  sodium chloride 3%  Inhalation 4 milliLiter(s) Inhalation every 8 hours PRN Sputum Induction      Culture - Nocardia (collected 2025 16:38)  Source: Bronchial  Preliminary Report (15 Feb 2025 08:05):  Testing in progress      Physical exam:                 General:               Pt is awake, alert,  appears to be in pain but not in distress                                                  Neuro:                  Nonfocal                             Psych:                   A&Ox3                          Cardiovascular:   S1 & S2, regular                           Respiratory:         Air entry is fair and equal on both sides, has bilateral conducted sounds                           GI:                          Soft, nondistended and nontender, Bowel sounds active                            Ext:                        No cyanosis or edema                            Labs:                                                                           14.9   7.45  )-----------( 173      ( 2025 04:25 )             46.8             02-17    136  |  101  |  18  ----------------------------<  104[H]  4.3   |  23  |  1.13    Ca    8.8      2025 04:25  Phos  3.5     02  Mg     2.00     02-17                    Urinalysis Basic - ( 2025 04:25 )    Color: x / Appearance: x / SG: x / pH: x  Gluc: 104 mg/dL / Ketone: x  / Bili: x / Urobili: x   Blood: x / Protein: x / Nitrite: x   Leuk Esterase: x / RBC: x / WBC x   Sq Epi: x / Non Sq Epi: x / Bacteria: x        Culture - Nocardia (collected 2025 16:38)  Source: Bronchial  Preliminary Report (15 Feb 2025 08:05):    Testing in progress      CXR:  < from: Xray Chest 1 View- PORTABLE-Routine (25 @ 05:30) >  IMPRESSION:  Interval decrease of left apical pneumothorax measuring 2.1 cm from apex   to cupola whencompared to prior chest x-ray 2/15/2025 at 5:15 AM    Residual lower lateral left chest wall subcutaneous emphysema and small   left effusion.      Plan:  General:  54yMale s/p Flexible bronchoscopy, Left uniportal VATS, Left upper lobe wedge resection and mediastinal lymph node biopsy  25   , experiencing  pain with deep breathing.                             Neuro:                                         Pain control with Oxycodone  /  Tylenol PRN / Lidopatch                            Cardiovascular:                                          Telemetry (medical test) - Reviewed by me today independently. Pt is in normal sinus rhythm .                                          HTN: Continue hemodynamic monitoring and Labetalol / Losartan                                         Continue hemodynamic monitoring to prevent decompensation.                            Respiratory:                                         Postop hypoxemia - Resolved                                             CXR is clear. Encourage incentive spirometry.                                                   Chest PT and frequent suctioning.                                          Necrotizing granuloma - In isolation,  sputum 1/ 3 positive for AFB. PCR is negative, awaiting QG     COPD: Continue bronchodilators, inhaled steroids, Pulmozyme and inhaled 3% saline inhalations.                                         OOB to chair & ambulate w/ assistance.                                          Continuous pulse oximetry for support & to prevent decompensation.                                         Monitor chest tube output                                         Chest tube to SUCTION. Has passive air leak  Blood patch today.                                                                                            GI                                         On  regular diet as tolerated                                         Continue G.I prophylaxis with Protonix                                         Continue Zofran / Reglan for nausea - PRN                                         Continue bowel regimen	                                                                 Renal:     RCC s/p Right nephrectomy and partial L-nephrectomy     Avoid hypotension and nephrotoxic agents     OK to have SBP in 120-160s                                         Continue LR  30cc/hr                                         Monitor I/Os and electrolytes                                                                                        Hem/ Onc:                                         DVT prophylaxis with SQ Heparin and SCDs                                         Monitor chest tube output &  signs of bleeding.                                          Follow CBC in AM                           Infectious disease:                                            Monitor for fever / leukocytosis.                                          All surgical incision / chest tube  sites look clean                            Endocrine                                            Prediabetes: Continue Accu-Checks with coverage                                                Pertinent clinical, laboratory, radiographic, hemodynamic, echocardiographic, respiratory data, microbiologic data and chart were reviewed and analyzed frequently throughout the course of the day and night. Patient seen, examined and plan discussed with CT Surgeon Dr. Iraheta / CTICU team during rounds.  OOB to chair and ambulate with physical therapy as tolerated.   Status discussed with patient and updated plan of care.   I have spent 50 minutes with this patient  monitoring hemodynamic status, respiratory status and  coordinating care in the ICU.        Dave Coleman MD

## 2025-02-17 NOTE — DIETITIAN INITIAL EVALUATION ADULT - OTHER INFO
Pt reports generally good appetite/PO intake PTA, consumes a regular diet at baseline. Pt confirms NKFA, denies difficulties chewing/swallowing. Pt lives at home with family, independent with ADLs PTA. Notable medications PTA per chart inclusive of metformin, atorvastatin, metoprolol and hydrochlorothiazide. Pt reported a stable wt trend PTA with UBW          Pt reports poor appetite/PO intake due to pain and discomfort from her recent procedure. Pt requires assistance with tray set up but is able to feed self independently. Last BM 6/3 per flowsheets. Ordered for Miralax 17 gm qD and senna 2 tablets qHS. Labs notable for hypophosphatemia, repleted. Discussed food choices for optimal healing and recovery with emphasis on high protein foods such as lean meat, poultry, fish and low fat dairy.  53 y/o male with hx COPD, RCC, GERD and HTN admitted with lung nodule now s/p MELISSA wedge resection. Pt reports generally good appetite/PO intake PTA, consumes a regular diet at baseline. Pt confirms NKFA, denies difficulties chewing/swallowing. Pt lives at home with his wife, independent with ADLs PTA. Notable medications PTA per chart inclusive of losartan and metoprolol. Pt reported a stable wt trend over the past year PTA with UBW ~250 lbs and current admit wt 270 lbs. Request new wt taken to clarify weight discrepancy.    Pt reports fair appetite/PO intake due to pain and discomfort from her recent procedure. Pt requires assistance with tray set up but is able to feed self independently. No BMs per flowsheets. Ordered for Miralax 17 gm qD and senna 2 tablets qHS. Reinforced dietary restrictions with patient and discussed food choices for optimal healing and recovery with emphasis on high protein foods such as lean meat, poultry, fish and low fat dairy. Patient with good understanding and appreciative of visit and information provided. Offered oral supplementation to augment pt's suboptimal oral intake, however pt reported that he was eating pretty well and declined offer. Encourage and monitor oral intake, as tolerated. RDN services to remain available as needed.

## 2025-02-17 NOTE — DIETITIAN INITIAL EVALUATION ADULT - PERTINENT LABORATORY DATA
02-17    136  |  101  |  18  ----------------------------<  104[H]  4.3   |  23  |  1.13    Ca    8.8      17 Feb 2025 04:25  Phos  3.5     02-17  Mg     2.00     02-17

## 2025-02-17 NOTE — DIETITIAN INITIAL EVALUATION ADULT - NSFNSGIIOFT_GEN_A_CORE
02-16-25 @ 07:01  -  02-17-25 @ 07:00  --------------------------------------------------------  OUT:    Chest Tube (mL): 140 mL  Total OUT: 140 mL    Total NET: -140 mL

## 2025-02-17 NOTE — DIETITIAN INITIAL EVALUATION ADULT - PERTINENT MEDS FT
MEDICATIONS  (STANDING):  chlorhexidine 2% Cloths 1 Application(s) Topical daily  fluticasone propionate/ salmeterol 100-50 MICROgram(s) Diskus 1 Dose(s) Inhalation two times a day  heparin   Injectable 7500 Unit(s) SubCutaneous every 8 hours  labetalol 200 milliGRAM(s) Oral every 8 hours  lidocaine   4% Patch 1 Patch Transdermal every 24 hours  losartan 50 milliGRAM(s) Oral daily  pantoprazole  Injectable 40 milliGRAM(s) IV Push daily  polyethylene glycol 3350 17 Gram(s) Oral daily  senna 2 Tablet(s) Oral at bedtime    MEDICATIONS  (PRN):  acetaminophen     Tablet .. 975 milliGRAM(s) Oral every 6 hours PRN Mild Pain (1 - 3)  ALPRAZolam 0.5 milliGRAM(s) Oral every 8 hours PRN anxiety  naloxone Injectable 0.1 milliGRAM(s) IV Push every 3 minutes PRN For ANY of the following changes in patient status:  A. RR LESS THAN 10 breaths per minute, B. Oxygen saturation LESS THAN 90%, C. Sedation score of 6  ondansetron Injectable 4 milliGRAM(s) IV Push every 6 hours PRN Nausea  oxyCODONE    IR 5 milliGRAM(s) Oral every 3 hours PRN Moderate Pain (4 - 6)  oxyCODONE    IR 10 milliGRAM(s) Oral every 3 hours PRN Severe Pain (7 - 10)  sodium chloride 3%  Inhalation 4 milliLiter(s) Inhalation every 8 hours PRN Sputum Induction

## 2025-02-18 ENCOUNTER — RX RENEWAL (OUTPATIENT)
Age: 55
End: 2025-02-18

## 2025-02-18 LAB
ANION GAP SERPL CALC-SCNC: 11 MMOL/L — SIGNIFICANT CHANGE UP (ref 7–14)
BUN SERPL-MCNC: 20 MG/DL — SIGNIFICANT CHANGE UP (ref 7–23)
CALCIUM SERPL-MCNC: 9.1 MG/DL — SIGNIFICANT CHANGE UP (ref 8.4–10.5)
CHLORIDE SERPL-SCNC: 98 MMOL/L — SIGNIFICANT CHANGE UP (ref 98–107)
CO2 SERPL-SCNC: 24 MMOL/L — SIGNIFICANT CHANGE UP (ref 22–31)
CREAT SERPL-MCNC: 1.22 MG/DL — SIGNIFICANT CHANGE UP (ref 0.5–1.3)
CULTURE RESULTS: SIGNIFICANT CHANGE UP
EGFR: 70 ML/MIN/1.73M2 — SIGNIFICANT CHANGE UP
GLUCOSE SERPL-MCNC: 98 MG/DL — SIGNIFICANT CHANGE UP (ref 70–99)
HCT VFR BLD CALC: 45.4 % — SIGNIFICANT CHANGE UP (ref 39–50)
HGB BLD-MCNC: 15.2 G/DL — SIGNIFICANT CHANGE UP (ref 13–17)
MAGNESIUM SERPL-MCNC: 2.1 MG/DL — SIGNIFICANT CHANGE UP (ref 1.6–2.6)
MCHC RBC-ENTMCNC: 30.1 PG — SIGNIFICANT CHANGE UP (ref 27–34)
MCHC RBC-ENTMCNC: 33.5 G/DL — SIGNIFICANT CHANGE UP (ref 32–36)
MCV RBC AUTO: 89.9 FL — SIGNIFICANT CHANGE UP (ref 80–100)
NRBC # BLD AUTO: 0 K/UL — SIGNIFICANT CHANGE UP (ref 0–0)
NRBC # FLD: 0 K/UL — SIGNIFICANT CHANGE UP (ref 0–0)
NRBC BLD AUTO-RTO: 0 /100 WBCS — SIGNIFICANT CHANGE UP (ref 0–0)
PHOSPHATE SERPL-MCNC: 4 MG/DL — SIGNIFICANT CHANGE UP (ref 2.5–4.5)
PLATELET # BLD AUTO: 201 K/UL — SIGNIFICANT CHANGE UP (ref 150–400)
POTASSIUM SERPL-MCNC: 4.5 MMOL/L — SIGNIFICANT CHANGE UP (ref 3.5–5.3)
POTASSIUM SERPL-SCNC: 4.5 MMOL/L — SIGNIFICANT CHANGE UP (ref 3.5–5.3)
RBC # BLD: 5.05 M/UL — SIGNIFICANT CHANGE UP (ref 4.2–5.8)
RBC # FLD: 14.1 % — SIGNIFICANT CHANGE UP (ref 10.3–14.5)
SODIUM SERPL-SCNC: 133 MMOL/L — LOW (ref 135–145)
SPECIMEN SOURCE: SIGNIFICANT CHANGE UP
WBC # BLD: 8.93 K/UL — SIGNIFICANT CHANGE UP (ref 3.8–10.5)
WBC # FLD AUTO: 8.93 K/UL — SIGNIFICANT CHANGE UP (ref 3.8–10.5)

## 2025-02-18 PROCEDURE — 99233 SBSQ HOSP IP/OBS HIGH 50: CPT

## 2025-02-18 PROCEDURE — 71045 X-RAY EXAM CHEST 1 VIEW: CPT | Mod: 26

## 2025-02-18 RX ORDER — BISACODYL 5 MG
10 TABLET, DELAYED RELEASE (ENTERIC COATED) ORAL ONCE
Refills: 0 | Status: COMPLETED | OUTPATIENT
Start: 2025-02-18 | End: 2025-02-18

## 2025-02-18 RX ADMIN — OXYCODONE HYDROCHLORIDE 10 MILLIGRAM(S): 30 TABLET ORAL at 05:49

## 2025-02-18 RX ADMIN — OXYCODONE HYDROCHLORIDE 10 MILLIGRAM(S): 30 TABLET ORAL at 18:50

## 2025-02-18 RX ADMIN — Medication 1 DOSE(S): at 09:22

## 2025-02-18 RX ADMIN — Medication 40 MILLIGRAM(S): at 12:07

## 2025-02-18 RX ADMIN — Medication 1 DOSE(S): at 22:09

## 2025-02-18 RX ADMIN — HEPARIN SODIUM 7500 UNIT(S): 1000 INJECTION INTRAVENOUS; SUBCUTANEOUS at 13:04

## 2025-02-18 RX ADMIN — Medication 10 MILLIGRAM(S): at 16:22

## 2025-02-18 RX ADMIN — LOSARTAN POTASSIUM 50 MILLIGRAM(S): 100 TABLET, FILM COATED ORAL at 05:50

## 2025-02-18 RX ADMIN — OXYCODONE HYDROCHLORIDE 10 MILLIGRAM(S): 30 TABLET ORAL at 18:20

## 2025-02-18 RX ADMIN — LIDOCAINE HYDROCHLORIDE 1 PATCH: 20 JELLY TOPICAL at 00:22

## 2025-02-18 RX ADMIN — LABETALOL HYDROCHLORIDE 200 MILLIGRAM(S): 200 TABLET, FILM COATED ORAL at 13:05

## 2025-02-18 RX ADMIN — Medication 5 MILLIGRAM(S): at 22:00

## 2025-02-18 RX ADMIN — Medication 1 APPLICATION(S): at 12:22

## 2025-02-18 RX ADMIN — POLYETHYLENE GLYCOL 3350 17 GRAM(S): 17 POWDER, FOR SOLUTION ORAL at 12:05

## 2025-02-18 RX ADMIN — HEPARIN SODIUM 7500 UNIT(S): 1000 INJECTION INTRAVENOUS; SUBCUTANEOUS at 22:01

## 2025-02-18 RX ADMIN — OXYCODONE HYDROCHLORIDE 10 MILLIGRAM(S): 30 TABLET ORAL at 06:30

## 2025-02-18 RX ADMIN — OXYCODONE HYDROCHLORIDE 10 MILLIGRAM(S): 30 TABLET ORAL at 00:00

## 2025-02-18 RX ADMIN — OXYCODONE HYDROCHLORIDE 10 MILLIGRAM(S): 30 TABLET ORAL at 13:00

## 2025-02-18 RX ADMIN — Medication 2 TABLET(S): at 22:00

## 2025-02-18 RX ADMIN — OXYCODONE HYDROCHLORIDE 10 MILLIGRAM(S): 30 TABLET ORAL at 12:00

## 2025-02-18 RX ADMIN — LABETALOL HYDROCHLORIDE 200 MILLIGRAM(S): 200 TABLET, FILM COATED ORAL at 22:00

## 2025-02-18 RX ADMIN — LIDOCAINE HYDROCHLORIDE 1 PATCH: 20 JELLY TOPICAL at 20:09

## 2025-02-18 RX ADMIN — LIDOCAINE HYDROCHLORIDE 1 PATCH: 20 JELLY TOPICAL at 12:19

## 2025-02-18 RX ADMIN — LIDOCAINE HYDROCHLORIDE 1 PATCH: 20 JELLY TOPICAL at 23:36

## 2025-02-18 RX ADMIN — LABETALOL HYDROCHLORIDE 200 MILLIGRAM(S): 200 TABLET, FILM COATED ORAL at 05:49

## 2025-02-18 RX ADMIN — HEPARIN SODIUM 7500 UNIT(S): 1000 INJECTION INTRAVENOUS; SUBCUTANEOUS at 05:49

## 2025-02-18 RX ADMIN — Medication 5 UNIT(S): at 12:58

## 2025-02-18 NOTE — PROGRESS NOTE ADULT - SUBJECTIVE AND OBJECTIVE BOX
YAYA MARIA      54y   Male   MRN-[id number]         No Known Allergies             Daily     Daily Weight in k (2025 00:00)Drug Dosing Weight  Height (cm): 180.3 (2025 06:06)  Weight (kg): 122.5 (2025 06:06)  BMI (kg/m2): 37.7 (2025 06:06)  BSA (m2): 2.4 (2025 06:06)    54 year old male, current smoker, w/ hx of HTN, COPD, GERD, s/p gastric sleeve ( at [facility]), Renal Cell Carcinoma  s/p Left nephrectomy and partial right nephrectomy. Presents with incidental findings of  multiple FDG avid lung nodules and lymphadenopathy. Scheduled for flexible bronchoscopy left video assisted thorascopic surgery, lung resection, possible mediastinal lymph node dissection  (2025 08:44)      CHIEF COMPLAINT: Follow up in ICU  for postoperative care of patient who is s/p lung surgery    Procedure: Flexible bronchoscopy, Left uniportal VATS, Left upper lobe wedge resection and mediastinal lymph node biopsy  25                     Issues:  Necrotizing granuloma              Lung nodule              Postop pain              Chest tube in place              At risk for respiratory complications              At risk for PCA infusion related complications  Morbid obesity  Cigarette smoker  HTN (hypertension)  GERD (gastroesophageal reflux disease)  History of prediabetes  Deviated nasal septum  Malignant neoplasm of unspecified kidney, except renal pelvis  Renal cell cancer s/p S/p nephrectomy right 9/3/2021  COPD (chronic obstructive pulmonary disease)  H/O partial nephrectomy - L  S/P gastric sleeve procedure    Postop course:     Patient reports moderate pain at chest wall incision sites which is worse with coughing and deep breathing without associated fever or dyspnea. Pain is improved with use of PCA and  oral pain meds.         Home Medications:  losartan 50 mg oral tablet: 1 tab(s) orally once a day AM (2025 06:22)  metoprolol: 25 milligram(s) orally once a day (2025 06:22)  tadalafil 20 mg oral tablet: 1 tab(s) orally once a day (2025 06:22)  Trelegy Ellipta 200 mcg-62.5 mcg-25 mcg/inh inhalation powder: 1 puff(s) inhaled once a day (2025 06:22)    PAST MEDICAL & SURGICAL HISTORY:  Morbid obesity      Cigarette smoker      HTN (hypertension)      GERD (gastroesophageal reflux disease)      History of prediabetes      Deviated nasal septum      Malignant neoplasm of unspecified kidney, except renal pelvis      Renal cell cancer      Other nonspecific abnormal finding of lung field      COPD (chronic obstructive pulmonary disease)      H/O colonoscopy      S/p nephrectomy  right 9/3/2021      H/O partial nephrectomy      S/P gastric sleeve procedure        Vital Signs Last 24 Hrs  T(C): 36.7 (2025 08:13), Max: 36.8 (2025 16:00)  T(F): 98 (2025 08:13), Max: 98.2 (2025 16:00)  HR: 66 (2025 08:13) (66 - 76)  BP: 144/83 (2025 08:13) (126/75 - 151/94)  BP(mean): 99 (2025 08:13) (89 - 109)  RR: 20 (2025 08:13) (13 - 23)  SpO2: 94% (2025 08:13) (94% - 97%)    Parameters below as of 2025 08:13  Patient On (Oxygen Delivery Method): room air      I&O's Detail    2025 07:01  -  2025 07:00  --------------------------------------------------------  IN:    Oral Fluid: 700 mL  Total IN: 700 mL    OUT:    Chest Tube (mL): 140 mL    Voided (mL): 1600 mL  Total OUT: 1740 mL    Total NET: -1040 mL        CAPILLARY BLOOD GLUCOSE        Home Medications:  losartan 50 mg oral tablet: 1 tab(s) orally once a day AM (2025 06:22)  metoprolol: 25 milligram(s) orally once a day (2025 06:22)  tadalafil 20 mg oral tablet: 1 tab(s) orally once a day (2025 06:22)  Trelegy Ellipta 200 mcg-62.5 mcg-25 mcg/inh inhalation powder: 1 puff(s) inhaled once a day (2025 06:22)    MEDICATIONS  (STANDING):  chlorhexidine 2% Cloths 1 Application(s) Topical daily  fluticasone propionate/ salmeterol 100-50 MICROgram(s) Diskus 1 Dose(s) Inhalation two times a day  heparin   Injectable 7500 Unit(s) SubCutaneous every 8 hours  labetalol 200 milliGRAM(s) Oral every 8 hours  lidocaine   4% Patch 1 Patch Transdermal every 24 hours  losartan 50 milliGRAM(s) Oral daily  pantoprazole  Injectable 40 milliGRAM(s) IV Push daily  polyethylene glycol 3350 17 Gram(s) Oral daily  senna 2 Tablet(s) Oral at bedtime    MEDICATIONS  (PRN):  acetaminophen     Tablet .. 975 milliGRAM(s) Oral every 6 hours PRN Mild Pain (1 - 3)  ALPRAZolam 0.5 milliGRAM(s) Oral every 8 hours PRN anxiety  naloxone Injectable 0.1 milliGRAM(s) IV Push every 3 minutes PRN For ANY of the following changes in patient status:  A. RR LESS THAN 10 breaths per minute, B. Oxygen saturation LESS THAN 90%, C. Sedation score of 6  ondansetron Injectable 4 milliGRAM(s) IV Push every 6 hours PRN Nausea  oxyCODONE    IR 5 milliGRAM(s) Oral every 3 hours PRN Moderate Pain (4 - 6)  oxyCODONE    IR 10 milliGRAM(s) Oral every 3 hours PRN Severe Pain (7 - 10)  sodium chloride 3%  Inhalation 4 milliLiter(s) Inhalation every 8 hours PRN Sputum Induction      Culture - Nocardia (collected 2025 16:38)  Source: Bronchial  Preliminary Report (15 Feb 2025 08:05):  Testing in progress      Physical exam:                 General:               Pt is awake, alert,  appears to be in pain but not in distress                                                  Neuro:                  Nonfocal                             Psych:                   A&Ox3                          Cardiovascular:   S1 & S2, regular                           Respiratory:         Air entry is fair and equal on both sides, has bilateral conducted sounds                           GI:                          Soft, nondistended and nontender, Bowel sounds active                            Ext:                        No cyanosis or edema                            Labs:                                                                           14.9   7.45  )-----------( 173      ( 2025 04:25 )             46.8             02-17    136  |  101  |  18  ----------------------------<  104[H]  4.3   |  23  |  1.13    Ca    8.8      2025 04:25  Phos  3.5     02  Mg     2.00     02-17                    Urinalysis Basic - ( 2025 04:25 )    Color: x / Appearance: x / SG: x / pH: x  Gluc: 104 mg/dL / Ketone: x  / Bili: x / Urobili: x   Blood: x / Protein: x / Nitrite: x   Leuk Esterase: x / RBC: x / WBC x   Sq Epi: x / Non Sq Epi: x / Bacteria: x        Culture - Nocardia (collected 2025 16:38)  Source: Bronchial  Preliminary Report (15 Feb 2025 08:05):    Testing in progress      CXR:  < from: Xray Chest 1 View- PORTABLE-Routine (25 @ 05:30) >  IMPRESSION:  Interval decrease of left apical pneumothorax measuring 2.1 cm from apex   to cupola whencompared to prior chest x-ray 2/15/2025 at 5:15 AM    Residual lower lateral left chest wall subcutaneous emphysema and small   left effusion.      Plan:  General:  54yMale s/p Flexible bronchoscopy, Left uniportal VATS, Left upper lobe wedge resection and mediastinal lymph node biopsy  25   , experiencing  pain with deep breathing.                             Neuro:                                         Pain control with Oxycodone  /  Tylenol PRN / Lidopatch                            Cardiovascular:                                          Telemetry (medical test) - Reviewed by me today independently. Pt is in normal sinus rhythm .                                          HTN: Continue hemodynamic monitoring and Labetalol / Losartan                                         Continue hemodynamic monitoring to prevent decompensation.                            Respiratory:                                         Postop hypoxemia - Resolved                                             CXR is clear. Encourage incentive spirometry.                                                   Chest PT and frequent suctioning.                                          Necrotizing granuloma - In isolation,  sputum 1/ 3 positive for AFB. PCR is negative, awaiting QG     COPD: Continue bronchodilators, inhaled steroids, Pulmozyme and inhaled 3% saline inhalations.                                         OOB to chair & ambulate w/ assistance.                                          Continuous pulse oximetry for support & to prevent decompensation.                                         Monitor chest tube output                                         Chest tube to SUCTION. Has passive air leak  Blood patch today.                                                                                            GI                                         On  regular diet as tolerated                                         Continue G.I prophylaxis with Protonix                                         Continue Zofran / Reglan for nausea - PRN                                         Continue bowel regimen	                                                                 Renal:     RCC s/p Right nephrectomy and partial L-nephrectomy     Avoid hypotension and nephrotoxic agents     OK to have SBP in 120-160s                                         Continue LR  30cc/hr                                         Monitor I/Os and electrolytes                                                                                        Hem/ Onc:                                         DVT prophylaxis with SQ Heparin and SCDs                                         Monitor chest tube output &  signs of bleeding.                                          Follow CBC in AM                           Infectious disease:                                            Monitor for fever / leukocytosis.                                          All surgical incision / chest tube  sites look clean                            Endocrine                                            Prediabetes: Continue Accu-Checks with coverage                                                Pertinent clinical, laboratory, radiographic, hemodynamic, echocardiographic, respiratory data, microbiologic data and chart were reviewed and analyzed frequently throughout the course of the day and night. Patient seen, examined and plan discussed with CT Surgeon Dr. Iraheta / CTICU team during rounds.  OOB to chair and ambulate with physical therapy as tolerated.   Status discussed with patient and updated plan of care.   I have spent 50 minutes with this patient  monitoring hemodynamic status, respiratory status and  coordinating care in the ICU.        Dave Coleman MD                                                                     YAYA MARIA      54y   Male   MRN-[id number]         No Known Allergies             Daily     Daily Weight in k (2025 00:00)Drug Dosing Weight  Height (cm): 180.3 (2025 06:06)  Weight (kg): 122.5 (2025 06:06)  BMI (kg/m2): 37.7 (2025 06:06)  BSA (m2): 2.4 (2025 06:06)    54 year old male, current smoker, w/ hx of HTN, COPD, GERD, s/p gastric sleeve ( at [facility]), Renal Cell Carcinoma  s/p Left nephrectomy and partial right nephrectomy. Presents with incidental findings of  multiple FDG avid lung nodules and lymphadenopathy. Scheduled for flexible bronchoscopy left video assisted thorascopic surgery, lung resection, possible mediastinal lymph node dissection  (2025 08:44)      CHIEF COMPLAINT: Follow up in ICU  for postoperative care of patient who is s/p lung surgery    Procedure: Flexible bronchoscopy, Left uniportal VATS, Left upper lobe wedge resection and mediastinal lymph node biopsy  25                     Issues:  Necrotizing granuloma              Lung nodule              Postop pain              Chest tube in place              At risk for respiratory complications              At risk for PCA infusion related complications  Morbid obesity  Cigarette smoker  HTN (hypertension)  GERD (gastroesophageal reflux disease)  History of prediabetes  Deviated nasal septum  Malignant neoplasm of unspecified kidney, except renal pelvis  Renal cell cancer s/p S/p nephrectomy right 9/3/2021  COPD (chronic obstructive pulmonary disease)  H/O partial nephrectomy - L  S/P gastric sleeve procedure    Postop course:     Patient reports moderate pain at chest wall incision sites which is worse with coughing and deep breathing without associated fever or dyspnea. Pain is improved with use of PCA and  oral pain meds.         Home Medications:  losartan 50 mg oral tablet: 1 tab(s) orally once a day AM (2025 06:22)  metoprolol: 25 milligram(s) orally once a day (2025 06:22)  tadalafil 20 mg oral tablet: 1 tab(s) orally once a day (2025 06:22)  Trelegy Ellipta 200 mcg-62.5 mcg-25 mcg/inh inhalation powder: 1 puff(s) inhaled once a day (2025 06:22)    PAST MEDICAL & SURGICAL HISTORY:  Morbid obesity      Cigarette smoker      HTN (hypertension)      GERD (gastroesophageal reflux disease)      History of prediabetes      Deviated nasal septum      Malignant neoplasm of unspecified kidney, except renal pelvis      Renal cell cancer      Other nonspecific abnormal finding of lung field      COPD (chronic obstructive pulmonary disease)      H/O colonoscopy      S/p nephrectomy  right 9/3/2021      H/O partial nephrectomy      S/P gastric sleeve procedure  =======================================  VITAL SIGNS:  Vital Signs Last 24 Hrs  T(C): 36.7 (2025 04:00), Max: 36.8 (2025 20:00)  T(F): 98 (2025 04:00), Max: 98.2 (2025 20:00)  HR: 64 (2025 04:00) (64 - 79)  BP: 123/69 (2025 04:00) (123/69 - 151/85)  BP(mean): 85 (2025 04:00) (85 - 108)  RR: 15 (2025 04:00) (14 - 16)  SpO2: 95% (2025 04:00) (93% - 97%)    Parameters below as of 2025 04:00  Patient On (Oxygen Delivery Method): room air      I/Os:   I&O's Detail    2025 07:01  -  2025 07:00  --------------------------------------------------------  IN:    Oral Fluid: 1600 mL  Total IN: 1600 mL    OUT:    Chest Tube (mL): 120 mL    Voided (mL): 1400 mL  Total OUT: 1520 mL    Total NET: 80 mL      MEDICATIONS:  MEDICATIONS  (STANDING):  bisacodyl 5 milliGRAM(s) Oral at bedtime  chlorhexidine 2% Cloths 1 Application(s) Topical daily  fluticasone propionate/ salmeterol 100-50 MICROgram(s) Diskus 1 Dose(s) Inhalation two times a day  heparin   Injectable 7500 Unit(s) SubCutaneous every 8 hours  labetalol 200 milliGRAM(s) Oral every 8 hours  lidocaine   4% Patch 1 Patch Transdermal every 24 hours  losartan 50 milliGRAM(s) Oral daily  pantoprazole  Injectable 40 milliGRAM(s) IV Push daily  polyethylene glycol 3350 17 Gram(s) Oral daily  senna 2 Tablet(s) Oral at bedtime    MEDICATIONS  (PRN):  acetaminophen     Tablet .. 975 milliGRAM(s) Oral every 6 hours PRN Mild Pain (1 - 3)  ALPRAZolam 0.5 milliGRAM(s) Oral every 8 hours PRN anxiety  naloxone Injectable 0.1 milliGRAM(s) IV Push every 3 minutes PRN For ANY of the following changes in patient status:  A. RR LESS THAN 10 breaths per minute, B. Oxygen saturation LESS THAN 90%, C. Sedation score of 6  ondansetron Injectable 4 milliGRAM(s) IV Push every 6 hours PRN Nausea  oxyCODONE    IR 5 milliGRAM(s) Oral every 3 hours PRN Moderate Pain (4 - 6)  oxyCODONE    IR 10 milliGRAM(s) Oral every 3 hours PRN Severe Pain (7 - 10)  sodium chloride 3%  Inhalation 4 milliLiter(s) Inhalation every 8 hours PRN Sputum Induction      LABS:  Laboratory data was independently reviewed by me today.                           14.7   7.54  )-----------( 183      ( 2025 03:45 )             44.2     -    135  |  99  |  20  ----------------------------<  109[H]  4.2   |  22  |  1.26    Ca    8.7      2025 03:45  Phos  3.7       Mg     2.00           RADIOLOGY:   Radiology images were independently reviewed by me today. Reports were reviewed by me today.    Xray Chest 1 View- PORTABLE-Urgent:   ACC: [id number] EXAM:  XR CHEST PORTABLE URGENT 1V   ORDERED BY: EDWARD GOMES     PROCEDURE DATE:  2025          INTERPRETATION:  EXAMINATION: XR CHEST URGENT    CLINICAL INDICATION: follow-up left pleural chest tube and pneumothorax.    TECHNIQUE: Single frontal, portable view of the chest was obtained.    COMPARISON: Chest x-ray 2025.    IMPRESSION:  Left pleural chest tube remains in place with tip near apical region;   stable residual left apical pneumothorax and mid to lower lateral left   chest wall subcutaneous emphysema.    Left basilar haziness with indistinct hemidiaphragm and blunted CP angle   suggesting pleural effusion component. Sharp right CP angle. Clear   remaining visualized lungs.    Stable cardiac and mediastinal silhouettes.    Midline normal caliber trachea.    --- End of Report ---    CYNTHIA ZIEGLER MD; Resident Radiologist  This document has been electronically signed.  ANIKET MAN MD; Attending Radiologist  This document has been electronically signed. 2025  3:10PM (25 @ 06:37)  Xray Chest 1 View- PORTABLE-Routine:   ACC: [id number] EXAM:  XR CHEST PORTABLE ROUTINE 1V   ORDERED BY: LATA DIAZ     ACC: [id number] EXAM:  XR CHEST PORTABLE URGENT 1V   ORDERED BY: LATA DIAZ     PROCEDURE DATE:  02/15/2025          INTERPRETATION:  EXAMINATION: XR CHEST URGENT, XR CHEST    CLINICAL INDICATION: Chest tube    TECHNIQUE: Single frontal, portable view of the chest was obtained.    COMPARISON: Chest x-ray 2025.    FINDINGS:  Chest x-ray 2/15/2025 of 5:15 AM    Left-sided chest tube. Upper lobe chain sutures.  The heart is normal in size.  No focal consolidations  Left apical pneumothorax measures 3.9 cm from apex to cupola.  Small left effusion. Sharp right CP angle. Clear remaining visualized   lungs.  No acute bony abnormality.    Chest x-ray 2/15/2025 of 5:36 PM:    Interval decrease of left apical pneumothorax measuring 2.1 cm from apex   to cupola. Otherwise no significant interval change    IMPRESSION:  Interval decrease of left apical pneumothorax measuring 2.1 cm from apex   to cupola whencompared to prior chest x-ray 2/15/2025 at 5:15 AM    Residual lower lateral left chest wall subcutaneous emphysema and small   left effusion.    --- End of Report ---  CHEMA DOLAN DO; Resident Radiologist  This document has been electronically signed.  ANIKET MAN MD; Attending Radiologist  This document has been electronically signed. 2025  3:05PM (25 @ 05:30)      ==============================================  Plan:  General:  54yMale s/p Flexible bronchoscopy, Left uniportal VATS, Left upper lobe wedge resection and mediastinal lymph node biopsy  25   , experiencing  pain with deep breathing.                             Neuro:                                         Pain control with Oxycodone  /  Tylenol PRN / Lidopatch                            Cardiovascular:                                          Telemetry (medical test) - Reviewed by me today independently. Pt is in normal sinus rhythm .                                          HTN: Continue hemodynamic monitoring and Labetalol / Losartan                                         Continue hemodynamic monitoring to prevent decompensation.                            Respiratory:                                         Postop hypoxemia - Resolved                                             CXR is clear. Encourage incentive spirometry.                                                   Chest PT and frequent suctioning.                                          Necrotizing granuloma - In isolation,  sputum 1/ 3 positive for AFB. PCR is negative, awaiting QG                                          PPD placed R Forearm                                         f/u Quantiferon TB      COPD: Continue bronchodilators, inhaled steroids, Pulmozyme and inhaled 3% saline inhalations.                                         OOB to chair & ambulate w/ assistance.                                          Continuous pulse oximetry for support & to prevent decompensation.                                         Monitor chest tube output                                         Chest tube to SUCTION. Has passive air leak  s/p Blood patch                                                                                      GI                                         On  regular diet as tolerated                                         Continue G.I prophylaxis with Protonix                                         Continue Zofran / Reglan for nausea - PRN                                         Continue bowel regimen	                                                                 Renal:     RCC s/p Right nephrectomy and partial L-nephrectomy     Avoid hypotension and nephrotoxic agents     OK to have SBP in 120-160s                                         Continue LR  30cc/hr                                         Monitor I/Os and electrolytes                                                                                        Hem/ Onc:                                         DVT prophylaxis with SQ Heparin and SCDs                                         Monitor chest tube output &  signs of bleeding.                                          Follow CBC in AM                           Infectious disease:                                            Monitor for fever / leukocytosis.                                          All surgical incision / chest tube  sites look clean                            Endocrine                                            Prediabetes: Continue Accu-Checks with coverage                          Patient requires continuous monitoring with:  bedside rhythm monitoring, continuous  pulse oximetry monitoring, O2 supplement titrate for O2 sat above 90%  ;   Care plan discussed with CT ICU care team and attending surgeon Dr Iraheta                     .  complex MDM , at risk for life threatening decompensation; required more than usual post op care;   I have spent 35 minutes providing non routine post op care, revaluated multiple times.      Brendan Davis MD  intensivist

## 2025-02-19 LAB
ANION GAP SERPL CALC-SCNC: 14 MMOL/L — SIGNIFICANT CHANGE UP (ref 7–14)
BUN SERPL-MCNC: 20 MG/DL — SIGNIFICANT CHANGE UP (ref 7–23)
CALCIUM SERPL-MCNC: 8.7 MG/DL — SIGNIFICANT CHANGE UP (ref 8.4–10.5)
CHLORIDE SERPL-SCNC: 99 MMOL/L — SIGNIFICANT CHANGE UP (ref 98–107)
CO2 SERPL-SCNC: 22 MMOL/L — SIGNIFICANT CHANGE UP (ref 22–31)
CREAT SERPL-MCNC: 1.26 MG/DL — SIGNIFICANT CHANGE UP (ref 0.5–1.3)
EGFR: 67 ML/MIN/1.73M2 — SIGNIFICANT CHANGE UP
GLUCOSE SERPL-MCNC: 109 MG/DL — HIGH (ref 70–99)
HCT VFR BLD CALC: 44.2 % — SIGNIFICANT CHANGE UP (ref 39–50)
HGB BLD-MCNC: 14.7 G/DL — SIGNIFICANT CHANGE UP (ref 13–17)
MAGNESIUM SERPL-MCNC: 2 MG/DL — SIGNIFICANT CHANGE UP (ref 1.6–2.6)
MCHC RBC-ENTMCNC: 29.9 PG — SIGNIFICANT CHANGE UP (ref 27–34)
MCHC RBC-ENTMCNC: 33.3 G/DL — SIGNIFICANT CHANGE UP (ref 32–36)
MCV RBC AUTO: 90 FL — SIGNIFICANT CHANGE UP (ref 80–100)
NRBC # BLD AUTO: 0 K/UL — SIGNIFICANT CHANGE UP (ref 0–0)
NRBC # FLD: 0 K/UL — SIGNIFICANT CHANGE UP (ref 0–0)
NRBC BLD AUTO-RTO: 0 /100 WBCS — SIGNIFICANT CHANGE UP (ref 0–0)
PHOSPHATE SERPL-MCNC: 3.7 MG/DL — SIGNIFICANT CHANGE UP (ref 2.5–4.5)
PLATELET # BLD AUTO: 183 K/UL — SIGNIFICANT CHANGE UP (ref 150–400)
POTASSIUM SERPL-MCNC: 4.2 MMOL/L — SIGNIFICANT CHANGE UP (ref 3.5–5.3)
POTASSIUM SERPL-SCNC: 4.2 MMOL/L — SIGNIFICANT CHANGE UP (ref 3.5–5.3)
RBC # BLD: 4.91 M/UL — SIGNIFICANT CHANGE UP (ref 4.2–5.8)
RBC # FLD: 13.8 % — SIGNIFICANT CHANGE UP (ref 10.3–14.5)
SODIUM SERPL-SCNC: 135 MMOL/L — SIGNIFICANT CHANGE UP (ref 135–145)
WBC # BLD: 7.54 K/UL — SIGNIFICANT CHANGE UP (ref 3.8–10.5)
WBC # FLD AUTO: 7.54 K/UL — SIGNIFICANT CHANGE UP (ref 3.8–10.5)

## 2025-02-19 PROCEDURE — 71045 X-RAY EXAM CHEST 1 VIEW: CPT | Mod: 26

## 2025-02-19 PROCEDURE — 99233 SBSQ HOSP IP/OBS HIGH 50: CPT

## 2025-02-19 RX ADMIN — Medication 975 MILLIGRAM(S): at 16:30

## 2025-02-19 RX ADMIN — OXYCODONE HYDROCHLORIDE 10 MILLIGRAM(S): 30 TABLET ORAL at 16:30

## 2025-02-19 RX ADMIN — HEPARIN SODIUM 7500 UNIT(S): 1000 INJECTION INTRAVENOUS; SUBCUTANEOUS at 13:19

## 2025-02-19 RX ADMIN — OXYCODONE HYDROCHLORIDE 10 MILLIGRAM(S): 30 TABLET ORAL at 12:00

## 2025-02-19 RX ADMIN — LOSARTAN POTASSIUM 50 MILLIGRAM(S): 100 TABLET, FILM COATED ORAL at 06:09

## 2025-02-19 RX ADMIN — OXYCODONE HYDROCHLORIDE 10 MILLIGRAM(S): 30 TABLET ORAL at 01:30

## 2025-02-19 RX ADMIN — LIDOCAINE HYDROCHLORIDE 1 PATCH: 20 JELLY TOPICAL at 23:16

## 2025-02-19 RX ADMIN — OXYCODONE HYDROCHLORIDE 10 MILLIGRAM(S): 30 TABLET ORAL at 17:00

## 2025-02-19 RX ADMIN — LIDOCAINE HYDROCHLORIDE 1 PATCH: 20 JELLY TOPICAL at 20:01

## 2025-02-19 RX ADMIN — Medication 2 TABLET(S): at 21:25

## 2025-02-19 RX ADMIN — LIDOCAINE HYDROCHLORIDE 1 PATCH: 20 JELLY TOPICAL at 12:12

## 2025-02-19 RX ADMIN — OXYCODONE HYDROCHLORIDE 10 MILLIGRAM(S): 30 TABLET ORAL at 07:30

## 2025-02-19 RX ADMIN — LABETALOL HYDROCHLORIDE 200 MILLIGRAM(S): 200 TABLET, FILM COATED ORAL at 21:25

## 2025-02-19 RX ADMIN — LABETALOL HYDROCHLORIDE 200 MILLIGRAM(S): 200 TABLET, FILM COATED ORAL at 06:09

## 2025-02-19 RX ADMIN — HEPARIN SODIUM 7500 UNIT(S): 1000 INJECTION INTRAVENOUS; SUBCUTANEOUS at 21:26

## 2025-02-19 RX ADMIN — OXYCODONE HYDROCHLORIDE 10 MILLIGRAM(S): 30 TABLET ORAL at 00:48

## 2025-02-19 RX ADMIN — POLYETHYLENE GLYCOL 3350 17 GRAM(S): 17 POWDER, FOR SOLUTION ORAL at 12:12

## 2025-02-19 RX ADMIN — Medication 40 MILLIGRAM(S): at 12:35

## 2025-02-19 RX ADMIN — Medication 40 MILLIGRAM(S): at 23:52

## 2025-02-19 RX ADMIN — OXYCODONE HYDROCHLORIDE 10 MILLIGRAM(S): 30 TABLET ORAL at 08:00

## 2025-02-19 RX ADMIN — HEPARIN SODIUM 7500 UNIT(S): 1000 INJECTION INTRAVENOUS; SUBCUTANEOUS at 06:09

## 2025-02-19 RX ADMIN — Medication 975 MILLIGRAM(S): at 17:00

## 2025-02-19 RX ADMIN — Medication 1 DOSE(S): at 22:37

## 2025-02-19 RX ADMIN — Medication 1 APPLICATION(S): at 12:13

## 2025-02-19 RX ADMIN — OXYCODONE HYDROCHLORIDE 10 MILLIGRAM(S): 30 TABLET ORAL at 13:00

## 2025-02-19 RX ADMIN — Medication 5 MILLIGRAM(S): at 21:26

## 2025-02-19 NOTE — PROGRESS NOTE ADULT - SUBJECTIVE AND OBJECTIVE BOX
YAYA MARIA                     [id number]    HPI:  54 year old male, current smoker, w/ hx of HTN, COPD, GERD, s/p gastric sleeve (2023 at [facility]), Renal Cell Carcinoma 2021 s/p Left nephrectomy and partial right nephrectomy. Presents with incidental findings of  multiple FDG avid lung nodules and lymphadenopathy. Scheduled for flexible bronchoscopy left video assisted thorascopic surgery, lung resection, possible mediastinal lymph node dissection  (07 Feb 2025 08:44)      CHIEF COMPLAINT: FOLLOW UP IN ICU FOR POSTOPERATIVE CARE OF PATIENT WHO IS S/P Left upper lobe wedge resection       PROCEDURES:     Flexible bronchoscopy, Left uniportal VATS, Left upper lobe wedge resection and mediastinal lymph node biopsy  2/13/25     ISSUES:     Lung nodule              Postop pain              Chest tube in place              At risk for respiratory complications  Morbid obesity  Cigarette smoker  HTN (hypertension)  GERD (gastroesophageal reflux disease)  History of prediabetes  Deviated nasal septum  Malignant neoplasm of unspecified kidney, except renal pelvis  Renal cell cancer s/p S/p nephrectomy right 9/3/2021  COPD (chronic obstructive pulmonary disease)  H/O partial nephrectomy - L              S/P gastric sleeve procedure      PAST MEDICAL & SURGICAL HISTORY:  Morbid obesity      Cigarette smoker      HTN (hypertension)      GERD (gastroesophageal reflux disease)      History of prediabetes      Deviated nasal septum      Malignant neoplasm of unspecified kidney, except renal pelvis      Renal cell cancer      Other nonspecific abnormal finding of lung field      COPD (chronic obstructive pulmonary disease)      H/O colonoscopy      S/p nephrectomy  right 9/3/2021      H/O partial nephrectomy      S/P gastric sleeve procedure                VITAL SIGNS:  Vital Signs Last 24 Hrs  T(C): 36.7 (19 Feb 2025 12:00), Max: 36.9 (19 Feb 2025 08:00)  T(F): 98.1 (19 Feb 2025 12:00), Max: 98.5 (19 Feb 2025 08:00)  HR: 73 (19 Feb 2025 12:00) (64 - 74)  BP: 128/91 (19 Feb 2025 12:00) (123/69 - 149/79)  BP(mean): 102 (19 Feb 2025 12:00) (85 - 108)  RR: 16 (19 Feb 2025 12:00) (14 - 16)  SpO2: 98% (19 Feb 2025 12:00) (93% - 98%)    Parameters below as of 19 Feb 2025 12:00  Patient On (Oxygen Delivery Method): room air        I/Os:   I&O's Detail    18 Feb 2025 07:01  -  19 Feb 2025 07:00  --------------------------------------------------------  IN:    Oral Fluid: 1600 mL  Total IN: 1600 mL    OUT:    Chest Tube (mL): 120 mL    Voided (mL): 1400 mL  Total OUT: 1520 mL    Total NET: 80 mL      19 Feb 2025 07:01  -  19 Feb 2025 12:28  --------------------------------------------------------  IN:  Total IN: 0 mL    OUT:    Chest Tube (mL): 10 mL  Total OUT: 10 mL    Total NET: -10 mL          CAPILLARY BLOOD GLUCOSE          =======================MEDICATIONS===================  MEDICATIONS  (STANDING):  bisacodyl 5 milliGRAM(s) Oral at bedtime  chlorhexidine 2% Cloths 1 Application(s) Topical daily  fluticasone propionate/ salmeterol 100-50 MICROgram(s) Diskus 1 Dose(s) Inhalation two times a day  heparin   Injectable 7500 Unit(s) SubCutaneous every 8 hours  labetalol 200 milliGRAM(s) Oral every 8 hours  lidocaine   4% Patch 1 Patch Transdermal every 24 hours  losartan 50 milliGRAM(s) Oral daily  pantoprazole    Tablet 40 milliGRAM(s) Oral before breakfast  polyethylene glycol 3350 17 Gram(s) Oral daily  senna 2 Tablet(s) Oral at bedtime    MEDICATIONS  (PRN):  acetaminophen     Tablet .. 975 milliGRAM(s) Oral every 6 hours PRN Mild Pain (1 - 3)  ALPRAZolam 0.5 milliGRAM(s) Oral every 8 hours PRN anxiety  naloxone Injectable 0.1 milliGRAM(s) IV Push every 3 minutes PRN For ANY of the following changes in patient status:  A. RR LESS THAN 10 breaths per minute, B. Oxygen saturation LESS THAN 90%, C. Sedation score of 6  ondansetron Injectable 4 milliGRAM(s) IV Push every 6 hours PRN Nausea  oxyCODONE    IR 5 milliGRAM(s) Oral every 3 hours PRN Moderate Pain (4 - 6)  oxyCODONE    IR 10 milliGRAM(s) Oral every 3 hours PRN Severe Pain (7 - 10)  sodium chloride 3%  Inhalation 4 milliLiter(s) Inhalation every 8 hours PRN Sputum Induction        PHYSICAL EXAM============================  General:                         Awake, alert, not in any distress  Neuro:                            Moving all extremities to commands.   Respiratory:	Air entry fair and  bilateral conducted sounds                                           Effort even and unlabored.  CV:		Regular rate and rhythm. Normal S1/S2                                          Distal pulses present.  Abdomen:	                     Soft, non-distended. Bowel sounds present   Skin:		No rash.  Extremities:	Warm, no cyanosis or edema.  Palpable pulses    ============================LABS=========================                        14.7   7.54  )-----------( 183      ( 19 Feb 2025 03:45 )             44.2     02-19    135  |  99  |  20  ----------------------------<  109[H]  4.2   |  22  |  1.26    Ca    8.7      19 Feb 2025 03:45  Phos  3.7     02-19  Mg     2.00     02-19            Urinalysis Basic - ( 19 Feb 2025 03:45 )    Color: x / Appearance: x / SG: x / pH: x  Gluc: 109 mg/dL / Ketone: x  / Bili: x / Urobili: x   Blood: x / Protein: x / Nitrite: x   Leuk Esterase: x / RBC: x / WBC x   Sq Epi: x / Non Sq Epi: x / Bacteria: x      ASSESSMENT AND PLAN:     NEURO:  Post-operative Pain - Stable. Pain control with oxycodone and Tylenol IV PRN.          RESPIRATORY:  Hypoxia - Wean nasal cannula for goal O2sat above 92. Obtain CXR. Incentive spirometry. Chest PT and frequent suctioning. Continue bronchodilators. OOB to chair & ambulate w/ assistance. Continuous pulse oximetry for support & to prevent decompensation.  Chest tube – Pleurevac regulated suctioning. + air leak, Monitor chest tube output.       CARDIOVASCULAR:  Hemodynamically stable - Not on pressors. Continue hemodynamic monitoring.  Telemetry (medical test) - Reviewed by me today independently. Normal sinus rhythm.  HTN - restart home ARB, continue metoprolol       RENAL:  H/o left and partial right nephrectomy, Stable - Monitor IOs and electrolytes. Keep K above 4.0 and Mg above 2.0.      RCC s/p Right nephrectomy and partial L-nephrectomy     Avoid hypotension and nephrotoxic agents      GASTROINTESTINAL:  GI prophylaxis not indicated  Zofran and Reglan IV PRN for nausea  Regular consistency diet         HEMATOLOGIC:  No signs of active bleeding. Monitor Hgb in CBC in AM  DVT prophylaxis with heparin subQ and SCDs.         INFECTIOUS DISEASE:  All surgical sites appear clean. No signs of active infection. Will monitor for fever and leukocytosis. F/u tissue and sputum AFb   On Isolation, pending ID RECs    ENDOCRINE:  Stable – Monitor glucose fingersticks for goal 120-180.           ONCOLOGY:  Lung nodule - Improved. S/P resection. Follow up final pathology.      Pertinent clinical, laboratory, radiographic, hemodynamic, echocardiographic, respiratory data, microbiologic data and chart were reviewed by myself and analyzed frequently throughout the course of the day and night by myself.    Plan discussed at length with the CTICU staff and Attending CT Surgeon -   Dr Corky Iraheta    Patient's status was discussed with patient at bedside.    I have spent 50 minutes of time with this patient monitoring hemodynamic status, respiratory status, and coordinating care in the ICU.    Kayla SCHULTEP

## 2025-02-20 LAB
ANION GAP SERPL CALC-SCNC: 12 MMOL/L — SIGNIFICANT CHANGE UP (ref 7–14)
BLD GP AB SCN SERPL QL: NEGATIVE — SIGNIFICANT CHANGE UP
BUN SERPL-MCNC: 24 MG/DL — HIGH (ref 7–23)
CALCIUM SERPL-MCNC: 9.1 MG/DL — SIGNIFICANT CHANGE UP (ref 8.4–10.5)
CHLORIDE SERPL-SCNC: 98 MMOL/L — SIGNIFICANT CHANGE UP (ref 98–107)
CO2 SERPL-SCNC: 24 MMOL/L — SIGNIFICANT CHANGE UP (ref 22–31)
CREAT SERPL-MCNC: 1.17 MG/DL — SIGNIFICANT CHANGE UP (ref 0.5–1.3)
EGFR: 74 ML/MIN/1.73M2 — SIGNIFICANT CHANGE UP
GAMMA INTERFERON BACKGROUND BLD IA-ACNC: 0.03 IU/ML — SIGNIFICANT CHANGE UP
GLUCOSE SERPL-MCNC: 92 MG/DL — SIGNIFICANT CHANGE UP (ref 70–99)
HCT VFR BLD CALC: 44.5 % — SIGNIFICANT CHANGE UP (ref 39–50)
HGB BLD-MCNC: 15.3 G/DL — SIGNIFICANT CHANGE UP (ref 13–17)
M TB IFN-G BLD-IMP: NEGATIVE — SIGNIFICANT CHANGE UP
M TB IFN-G CD4+ BCKGRND COR BLD-ACNC: 0 IU/ML — SIGNIFICANT CHANGE UP
M TB IFN-G CD4+CD8+ BCKGRND COR BLD-ACNC: 0 IU/ML — SIGNIFICANT CHANGE UP
MAGNESIUM SERPL-MCNC: 2.1 MG/DL — SIGNIFICANT CHANGE UP (ref 1.6–2.6)
MCHC RBC-ENTMCNC: 30.7 PG — SIGNIFICANT CHANGE UP (ref 27–34)
MCHC RBC-ENTMCNC: 34.4 G/DL — SIGNIFICANT CHANGE UP (ref 32–36)
MCV RBC AUTO: 89.2 FL — SIGNIFICANT CHANGE UP (ref 80–100)
NRBC # BLD AUTO: 0 K/UL — SIGNIFICANT CHANGE UP (ref 0–0)
NRBC # FLD: 0 K/UL — SIGNIFICANT CHANGE UP (ref 0–0)
NRBC BLD AUTO-RTO: 0 /100 WBCS — SIGNIFICANT CHANGE UP (ref 0–0)
PHOSPHATE SERPL-MCNC: 3.5 MG/DL — SIGNIFICANT CHANGE UP (ref 2.5–4.5)
PLATELET # BLD AUTO: 201 K/UL — SIGNIFICANT CHANGE UP (ref 150–400)
POTASSIUM SERPL-MCNC: 4.2 MMOL/L — SIGNIFICANT CHANGE UP (ref 3.5–5.3)
POTASSIUM SERPL-SCNC: 4.2 MMOL/L — SIGNIFICANT CHANGE UP (ref 3.5–5.3)
QUANT TB PLUS MITOGEN MINUS NIL: 4.89 IU/ML — SIGNIFICANT CHANGE UP
RBC # BLD: 4.99 M/UL — SIGNIFICANT CHANGE UP (ref 4.2–5.8)
RBC # FLD: 13.8 % — SIGNIFICANT CHANGE UP (ref 10.3–14.5)
RH IG SCN BLD-IMP: POSITIVE — SIGNIFICANT CHANGE UP
SODIUM SERPL-SCNC: 134 MMOL/L — LOW (ref 135–145)
WBC # BLD: 7.21 K/UL — SIGNIFICANT CHANGE UP (ref 3.8–10.5)
WBC # FLD AUTO: 7.21 K/UL — SIGNIFICANT CHANGE UP (ref 3.8–10.5)

## 2025-02-20 PROCEDURE — 71045 X-RAY EXAM CHEST 1 VIEW: CPT | Mod: 26

## 2025-02-20 PROCEDURE — G0545: CPT

## 2025-02-20 PROCEDURE — 99232 SBSQ HOSP IP/OBS MODERATE 35: CPT

## 2025-02-20 PROCEDURE — ZZZZZ: CPT

## 2025-02-20 PROCEDURE — 99291 CRITICAL CARE FIRST HOUR: CPT

## 2025-02-20 RX ORDER — HYDROMORPHONE/SOD CHLOR,ISO/PF 2 MG/10 ML
0.5 SYRINGE (ML) INJECTION ONCE
Refills: 0 | Status: DISCONTINUED | OUTPATIENT
Start: 2025-02-20 | End: 2025-02-20

## 2025-02-20 RX ORDER — MAGNESIUM, ALUMINUM HYDROXIDE 200-200 MG
30 TABLET,CHEWABLE ORAL ONCE
Refills: 0 | Status: COMPLETED | OUTPATIENT
Start: 2025-02-20 | End: 2025-02-20

## 2025-02-20 RX ORDER — DOXYCYCLINE HYCLATE 100 MG
500 TABLET ORAL ONCE
Refills: 0 | Status: COMPLETED | OUTPATIENT
Start: 2025-02-20 | End: 2025-02-20

## 2025-02-20 RX ORDER — LIDOCAINE HCL/PF 10 MG/ML
20 VIAL (ML) INJECTION ONCE
Refills: 0 | Status: COMPLETED | OUTPATIENT
Start: 2025-02-20 | End: 2025-02-20

## 2025-02-20 RX ORDER — DIPHENHYDRAMINE HCL 12.5MG/5ML
25 ELIXIR ORAL ONCE
Refills: 0 | Status: COMPLETED | OUTPATIENT
Start: 2025-02-20 | End: 2025-02-20

## 2025-02-20 RX ADMIN — LABETALOL HYDROCHLORIDE 200 MILLIGRAM(S): 200 TABLET, FILM COATED ORAL at 06:03

## 2025-02-20 RX ADMIN — Medication 30 MILLILITER(S): at 17:22

## 2025-02-20 RX ADMIN — Medication 975 MILLIGRAM(S): at 13:30

## 2025-02-20 RX ADMIN — Medication 1 APPLICATION(S): at 12:53

## 2025-02-20 RX ADMIN — OXYCODONE HYDROCHLORIDE 10 MILLIGRAM(S): 30 TABLET ORAL at 22:22

## 2025-02-20 RX ADMIN — OXYCODONE HYDROCHLORIDE 10 MILLIGRAM(S): 30 TABLET ORAL at 22:52

## 2025-02-20 RX ADMIN — OXYCODONE HYDROCHLORIDE 10 MILLIGRAM(S): 30 TABLET ORAL at 18:00

## 2025-02-20 RX ADMIN — LOSARTAN POTASSIUM 50 MILLIGRAM(S): 100 TABLET, FILM COATED ORAL at 06:03

## 2025-02-20 RX ADMIN — Medication 40 MILLIGRAM(S): at 06:10

## 2025-02-20 RX ADMIN — HEPARIN SODIUM 7500 UNIT(S): 1000 INJECTION INTRAVENOUS; SUBCUTANEOUS at 13:10

## 2025-02-20 RX ADMIN — Medication 1 DOSE(S): at 21:50

## 2025-02-20 RX ADMIN — Medication 0.5 MILLIGRAM(S): at 13:45

## 2025-02-20 RX ADMIN — Medication 1 DOSE(S): at 09:46

## 2025-02-20 RX ADMIN — OXYCODONE HYDROCHLORIDE 10 MILLIGRAM(S): 30 TABLET ORAL at 05:20

## 2025-02-20 RX ADMIN — OXYCODONE HYDROCHLORIDE 10 MILLIGRAM(S): 30 TABLET ORAL at 17:25

## 2025-02-20 RX ADMIN — HEPARIN SODIUM 7500 UNIT(S): 1000 INJECTION INTRAVENOUS; SUBCUTANEOUS at 21:54

## 2025-02-20 RX ADMIN — Medication 0.5 MILLIGRAM(S): at 13:30

## 2025-02-20 RX ADMIN — Medication 5 MILLIGRAM(S): at 21:54

## 2025-02-20 RX ADMIN — OXYCODONE HYDROCHLORIDE 10 MILLIGRAM(S): 30 TABLET ORAL at 04:40

## 2025-02-20 RX ADMIN — HEPARIN SODIUM 7500 UNIT(S): 1000 INJECTION INTRAVENOUS; SUBCUTANEOUS at 06:03

## 2025-02-20 RX ADMIN — Medication 500 MILLIGRAM(S): at 13:35

## 2025-02-20 RX ADMIN — Medication 20 MILLILITER(S): at 13:35

## 2025-02-20 RX ADMIN — Medication 975 MILLIGRAM(S): at 13:00

## 2025-02-20 RX ADMIN — Medication 25 MILLIGRAM(S): at 13:08

## 2025-02-20 NOTE — CHART NOTE - NSCHARTNOTEFT_GEN_A_CORE
Patient was premedicated with tylenol and benadryl. Pleural drain tubing was cleaned with chlorhexidene.  Total 20 mL of 1% lidocaine was injected into left pleural cavity through the tubing, then total 100 mL of doxycycline ( Total 500 mg doxycycline was diluted into 100 cc of sterile normal saline.) was injected into pleural cavity through tubing. Then another 30 mL of sterile normal saline was injected through the tubing. Pleural tube connected to vac was placed overhead throughout the procedure. Sterile technique was followed throughout the procedure. Pt tolerated the procedure well without any immediate complication.

## 2025-02-20 NOTE — PROGRESS NOTE ADULT - SUBJECTIVE AND OBJECTIVE BOX
Follow Up:    AFB+    Interval History/ROS:  VSS. Quantiferon neg. Patient was seen and examined at bedside. No chest pain, SOB, fever, or cough.    Allergies  No Known Allergies        ANTIMICROBIALS:      OTHER MEDS:  MEDICATIONS  (STANDING):  acetaminophen     Tablet .. 975 every 6 hours PRN  ALPRAZolam 0.5 every 8 hours PRN  bisacodyl 5 at bedtime  fluticasone propionate/ salmeterol 100-50 MICROgram(s) Diskus 1 two times a day  heparin   Injectable 7500 every 8 hours  labetalol 200 every 8 hours  losartan 50 daily  ondansetron Injectable 4 every 6 hours PRN  oxyCODONE    IR 5 every 3 hours PRN  oxyCODONE    IR 10 every 3 hours PRN  pantoprazole    Tablet 40 before breakfast  polyethylene glycol 3350 17 daily  senna 2 at bedtime  sodium chloride 3%  Inhalation 4 every 8 hours PRN      Vital Signs Last 24 Hrs  T(C): 36.7 (20 Feb 2025 12:00), Max: 36.8 (19 Feb 2025 20:00)  T(F): 98.1 (20 Feb 2025 12:00), Max: 98.2 (19 Feb 2025 20:00)  HR: 71 (20 Feb 2025 13:00) (58 - 76)  BP: 104/83 (20 Feb 2025 13:00) (104/83 - 135/83)  BP(mean): 88 (20 Feb 2025 13:00) (83 - 102)  RR: 19 (20 Feb 2025 13:00) (15 - 19)  SpO2: 99% (20 Feb 2025 13:00) (94% - 99%)    Parameters below as of 20 Feb 2025 13:00  Patient On (Oxygen Delivery Method): room air        PHYSICAL EXAM:  Constitutional: non-toxic, no distress  Cardiovascular:   normal S1, S2, no murmur, RRR  Respiratory:  clear BS bilaterally, L chest tube  GI:  soft  Neurologic: awake and oriented x3  Skin:  no rash, no phlebitis                                15.3   7.21  )-----------( 201      ( 20 Feb 2025 04:30 )             44.5       02-20    134[L]  |  98  |  24[H]  ----------------------------<  92  4.2   |  24  |  1.17    Ca    9.1      20 Feb 2025 04:30  Phos  3.5     02-20  Mg     2.10     02-20        Urinalysis Basic - ( 20 Feb 2025 04:30 )    Color: x / Appearance: x / SG: x / pH: x  Gluc: 92 mg/dL / Ketone: x  / Bili: x / Urobili: x   Blood: x / Protein: x / Nitrite: x   Leuk Esterase: x / RBC: x / WBC x   Sq Epi: x / Non Sq Epi: x / Bacteria: x        MICROBIOLOGY:  v  Bronchial  02-14-25   No Nocardia isolated  --  --      .Sputum Sputum  02-14-25   Culture is being performed.  --  --      .Sputum Sputum  02-14-25   Culture is being performed.  --  --      .Sputum Sputum  02-13-25   Growth of acid fast bacilli detected in broth media.  Identification will be performed when biomass is adequate for testing.  --  --      Tissue  02-13-25   No growth at 5 days  --    No polymorphonuclear leukocytes per low power field  No organisms seen per oil power field      Quantiferon Plus TB (02.15.25 @ 03:50)   Quantiferon TB Plus: NEGATIVE: NEGATIVE: M. tuberculosis infection is NOT likely. No interferon-gamma   response to M. tuberculosis antigens was detected   POSITIVE: M. tuberculosis infection is likely. Interferon-gamma response   to M. tuberculosis antigens was detected. False-positive results may   occur in patients with prior infection with M. marinum, M. szulgai, or M.   kansasii.   INDETERMINATE: Likelihood of M. tuberculosis infection cannot be   determined. Repeat testing on a new specimen is suggested.   The QuantiFERON-TB Gold Plus assay measures T-cell release of   interferon-gamma following stimulation by M. tuberculosis complex   antigens (ESAT-6 and CFP-10). The magnitude of the amount of measured   interferon-gamma cannot be correlated to stage or degree of infection,   level of immune responsiveness, or likelihood for progression to active   disease. Results should be used in conjunction with risk assessment,   radiography, and other medical and diagnostic evaluations.  Quantiferon TB Plus Nil: 0.03 IU/mL  Quantiferon TB Plus TB1 minus Nil: 0.00 IU/mL  Quantiferon TB Plus TB2 minus Nil: 0.00 IU/mL  Quant TB Plus Mitogen minus Nil: 4.89 IU/mL          RADIOLOGY:  Imaging below independently reviewed.  < from: Xray Chest 1 View- PORTABLE-Urgent (Xray Chest 1 View- PORTABLE-Urgent .) (02.19.25 @ 14:26) >    ACC: [id number] EXAM:  XR CHEST PORTABLE ROUTINE 1V   ORDERED BY: SHAWN JONES     ACC: [id number] EXAM:  XR CHEST PORTABLE ROUTINE 1V   ORDERED BY: CHEYANNE PAGE     ACC: [id number] EXAM:  XR CHEST PORTABLE URGENT 1V   ORDERED BY: CHEYANNE PAGE     PROCEDURE DATE:  02/19/2025          INTERPRETATION:  INDICATION: Left VATS.    TECHNIQUE: Single portable view of the chest    COMPARISON: 2/17/2025    FINDINGS:    2/18/2025 at 5:03 AM: Left-sided chest tube is unchanged. Unchanged small   left apical pneumothorax. Unchanged small amount of subcutaneous   emphysema in the left chest wall. No focal consolidation. Questionable   trace left pleural effusion.    2/19/2025 at 5:06 AM: No interval change.    2/19/2025 at 2:16 PM: No significant interval change.    IMPRESSION: Left-sided chest tube in place. Persistent small left apical   pneumothorax. Small left pleural effusion and/or left basilar   atelectasis. Trace subcutaneous emphysema in the left chest wall.    --- End of Report ---            EDUARDO SALDANA MD; Attending Radiologist  This document has been electronically signed. Feb 19 2025  2:36PM    < end of copied text >

## 2025-02-20 NOTE — PROGRESS NOTE ADULT - ASSESSMENT
54-yo M w/ PMH of obesity s/p gastric sleeve, RCC s/p L nephrectomy and partial R nephrectomy, and recent imaging findings suggestive of multiple lung nodules, presenting for MELISSA wedge resection and LN biopsy (2/13), consulted for AFB smear positive on tissue and sputum (2/13). Sputum from 2/14 neg AFB.    Recent CT chest (12/17/24) finding suspicious for 2-cm spiculated RUL nodule, 1.6 cm L apical nodule, and 0.9 cm MELISSA nodule. Also PET/CT scan 1/14/25 w/ several FDG-avid b/l lung nodules, increased LN, and R parotid soft tissue nodule. Raises concern for malignancy.    Also per primary team, intra-op finding was suggestive of necrotizing granuloma, raises concern for infectious etiology. NTM and Nocardia can also still be within differential, although nocardia culture was neg. MTB PCR neg in both tissue and resp. Quantiferon neg. Low risk of TB exposure from social and occupational history.    #Necrotizing granuloma  #AFB positivity  #Lung nodules  #Abnormal CT scan    Recommendations:  - F/u AFB culture results. Can follow up as outpatient.  - Hold off antibiotics  - D/c airborne isolation  - F/u path    Plan discussed with CTICU provider.    Topics relating to disease transmission risk assessment and mitigation, complex antimicrobial therapy counseling and treatment, and/or public health investigation, analysis, and testing were addressed.    Thank you for this consult.         Eleazar Baltazar MD, PhD  Attending Physician  Division of Infectious Diseases  Department of Medicine    Please contact through MS Teams message.  Office: [phone] (after 5 PM or weekend) .    54-yo M w/ PMH of obesity s/p gastric sleeve, RCC s/p L nephrectomy and partial R nephrectomy, and recent imaging findings suggestive of multiple lung nodules, presenting for MELISSA wedge resection and LN biopsy (2/13), consulted for AFB smear positive on tissue and sputum (2/13). Sputum from 2/14 neg AFB.    Recent CT chest (12/17/24) finding suspicious for 2-cm spiculated RUL nodule, 1.6 cm L apical nodule, and 0.9 cm MELISSA nodule. Also PET/CT scan 1/14/25 w/ several FDG-avid b/l lung nodules, increased LN, and R parotid soft tissue nodule. Raises concern for malignancy.    Also per primary team, intra-op finding was suggestive of necrotizing granuloma, raises concern for infectious etiology. NTM and Nocardia can also still be within differential, although nocardia culture was neg. MTB PCR neg in both tissue and resp. Quantiferon neg. No respiratory symptoms or B-symptoms. Low risk of TB exposure from social and occupational history.     #Necrotizing granuloma  #AFB positivity  #Lung nodules  #Abnormal CT scan    Recommendations:  - F/u AFB culture results. Can follow up as outpatient.  - Hold off antibiotics  - D/c airborne isolation  - F/u path    Plan discussed with CTICU provider.    Topics relating to disease transmission risk assessment and mitigation, complex antimicrobial therapy counseling and treatment, and/or public health investigation, analysis, and testing were addressed.    Thank you for this consult.         Eleazar Baltazar MD, PhD  Attending Physician  Division of Infectious Diseases  Department of Medicine    Please contact through MS Teams message.  Office: [phone] (after 5 PM or weekend) .

## 2025-02-21 ENCOUNTER — TRANSCRIPTION ENCOUNTER (OUTPATIENT)
Age: 55
End: 2025-02-21

## 2025-02-21 LAB
ANION GAP SERPL CALC-SCNC: 12 MMOL/L — SIGNIFICANT CHANGE UP (ref 7–14)
BUN SERPL-MCNC: 25 MG/DL — HIGH (ref 7–23)
CALCIUM SERPL-MCNC: 9.1 MG/DL — SIGNIFICANT CHANGE UP (ref 8.4–10.5)
CHLORIDE SERPL-SCNC: 101 MMOL/L — SIGNIFICANT CHANGE UP (ref 98–107)
CO2 SERPL-SCNC: 24 MMOL/L — SIGNIFICANT CHANGE UP (ref 22–31)
CREAT SERPL-MCNC: 1.15 MG/DL — SIGNIFICANT CHANGE UP (ref 0.5–1.3)
EGFR: 75 ML/MIN/1.73M2 — SIGNIFICANT CHANGE UP
GLUCOSE SERPL-MCNC: 94 MG/DL — SIGNIFICANT CHANGE UP (ref 70–99)
HCT VFR BLD CALC: 48 % — SIGNIFICANT CHANGE UP (ref 39–50)
HGB BLD-MCNC: 15.6 G/DL — SIGNIFICANT CHANGE UP (ref 13–17)
MAGNESIUM SERPL-MCNC: 2.2 MG/DL — SIGNIFICANT CHANGE UP (ref 1.6–2.6)
MCHC RBC-ENTMCNC: 29.6 PG — SIGNIFICANT CHANGE UP (ref 27–34)
MCHC RBC-ENTMCNC: 32.5 G/DL — SIGNIFICANT CHANGE UP (ref 32–36)
MCV RBC AUTO: 91.1 FL — SIGNIFICANT CHANGE UP (ref 80–100)
NRBC # BLD AUTO: 0 K/UL — SIGNIFICANT CHANGE UP (ref 0–0)
NRBC # FLD: 0 K/UL — SIGNIFICANT CHANGE UP (ref 0–0)
NRBC BLD AUTO-RTO: 0 /100 WBCS — SIGNIFICANT CHANGE UP (ref 0–0)
PHOSPHATE SERPL-MCNC: 3.4 MG/DL — SIGNIFICANT CHANGE UP (ref 2.5–4.5)
PLATELET # BLD AUTO: 192 K/UL — SIGNIFICANT CHANGE UP (ref 150–400)
POTASSIUM SERPL-MCNC: 4.4 MMOL/L — SIGNIFICANT CHANGE UP (ref 3.5–5.3)
POTASSIUM SERPL-SCNC: 4.4 MMOL/L — SIGNIFICANT CHANGE UP (ref 3.5–5.3)
RBC # BLD: 5.27 M/UL — SIGNIFICANT CHANGE UP (ref 4.2–5.8)
RBC # FLD: 14 % — SIGNIFICANT CHANGE UP (ref 10.3–14.5)
SODIUM SERPL-SCNC: 137 MMOL/L — SIGNIFICANT CHANGE UP (ref 135–145)
WBC # BLD: 8.07 K/UL — SIGNIFICANT CHANGE UP (ref 3.8–10.5)
WBC # FLD AUTO: 8.07 K/UL — SIGNIFICANT CHANGE UP (ref 3.8–10.5)

## 2025-02-21 PROCEDURE — 99233 SBSQ HOSP IP/OBS HIGH 50: CPT

## 2025-02-21 PROCEDURE — 71045 X-RAY EXAM CHEST 1 VIEW: CPT | Mod: 26

## 2025-02-21 RX ADMIN — HEPARIN SODIUM 7500 UNIT(S): 1000 INJECTION INTRAVENOUS; SUBCUTANEOUS at 20:55

## 2025-02-21 RX ADMIN — OXYCODONE HYDROCHLORIDE 10 MILLIGRAM(S): 30 TABLET ORAL at 03:25

## 2025-02-21 RX ADMIN — LIDOCAINE HYDROCHLORIDE 1 PATCH: 20 JELLY TOPICAL at 18:05

## 2025-02-21 RX ADMIN — OXYCODONE HYDROCHLORIDE 10 MILLIGRAM(S): 30 TABLET ORAL at 20:54

## 2025-02-21 RX ADMIN — OXYCODONE HYDROCHLORIDE 10 MILLIGRAM(S): 30 TABLET ORAL at 08:42

## 2025-02-21 RX ADMIN — Medication 1 DOSE(S): at 09:23

## 2025-02-21 RX ADMIN — Medication 40 MILLIGRAM(S): at 08:32

## 2025-02-21 RX ADMIN — LABETALOL HYDROCHLORIDE 200 MILLIGRAM(S): 200 TABLET, FILM COATED ORAL at 14:19

## 2025-02-21 RX ADMIN — LABETALOL HYDROCHLORIDE 200 MILLIGRAM(S): 200 TABLET, FILM COATED ORAL at 20:54

## 2025-02-21 RX ADMIN — LIDOCAINE HYDROCHLORIDE 1 PATCH: 20 JELLY TOPICAL at 11:00

## 2025-02-21 RX ADMIN — OXYCODONE HYDROCHLORIDE 10 MILLIGRAM(S): 30 TABLET ORAL at 09:15

## 2025-02-21 RX ADMIN — HEPARIN SODIUM 7500 UNIT(S): 1000 INJECTION INTRAVENOUS; SUBCUTANEOUS at 14:19

## 2025-02-21 RX ADMIN — LIDOCAINE HYDROCHLORIDE 1 PATCH: 20 JELLY TOPICAL at 23:43

## 2025-02-21 RX ADMIN — OXYCODONE HYDROCHLORIDE 10 MILLIGRAM(S): 30 TABLET ORAL at 21:24

## 2025-02-21 RX ADMIN — OXYCODONE HYDROCHLORIDE 10 MILLIGRAM(S): 30 TABLET ORAL at 14:24

## 2025-02-21 RX ADMIN — LOSARTAN POTASSIUM 50 MILLIGRAM(S): 100 TABLET, FILM COATED ORAL at 05:12

## 2025-02-21 RX ADMIN — OXYCODONE HYDROCHLORIDE 10 MILLIGRAM(S): 30 TABLET ORAL at 02:55

## 2025-02-21 RX ADMIN — HEPARIN SODIUM 7500 UNIT(S): 1000 INJECTION INTRAVENOUS; SUBCUTANEOUS at 05:12

## 2025-02-21 RX ADMIN — OXYCODONE HYDROCHLORIDE 10 MILLIGRAM(S): 30 TABLET ORAL at 15:00

## 2025-02-21 NOTE — DISCHARGE NOTE PROVIDER - HOSPITAL COURSE
Patient is a 54 year old male, current smoker, w/ hx of HTN, COPD, GERD, s/p gastric sleeve (2023 at [facility]), Renal Cell Carcinoma 2021 s/p Left nephrectomy and partial right nephrectomy who is now s/p flexible bronchoscopy, uniportal left video assisted thorascopic surgery, left upper lobe wedge resection, mediastinal lymph node dissection with Dr. Iraheta on 2/13/25. Operative sample showed necrotizing granuloma on frozen pathology, patient was placed on isolation precautions in ICU. Infectious disease cleared patient from isolation after a series of negative AFB, quant gold and PPD and was transferred to stepdown floor. Postoperative course also complicated by persistent airleak and patient has undergone a bloodpatch attempt and doxy pleurodesis. The patient was discharged home after the airleak resolved, the chest tube was removed and serial CXRs were stable. Patient is a 54 year old male, current smoker, w/ hx of HTN, COPD, GERD, s/p gastric sleeve (2023 at [facility]), Renal Cell Carcinoma 2021 s/p Left nephrectomy and partial right nephrectomy who is now s/p flexible bronchoscopy, uniportal left video assisted thorascopic surgery, left upper lobe wedge resection, mediastinal lymph node dissection with Dr. Iraheta on 2/13/25. Operative sample showed necrotizing granuloma on frozen pathology, patient was placed on isolation precautions in ICU. Infectious disease cleared patient from isolation after a series of negative AFB, quant gold and PPD and was transferred to stepdown floor. Postoperative course also complicated by persistent airleak and patient has undergone a bloodpatch attempt and doxy pleurodesis. Now chest tube on waterseal for 24hrs with stable CXR. Pt with continued left apical space but no air leak. CT was removed  today and left apical space unchanged. ID aware of + AFB sputum from 2/13 and 2/14- no new changes in plan. Pt does not need new isolation. Can be discharged   w ID follow up as outpt. Pt feels well. On RA. NO CP or SOB. OOB and amb frequently. Left VATS c/d/i. Lungs CTA. + BM. Cleared for dc to home by DR. Loaiaz.   Vital Signs Last 24 Hrs  T(C): 36.5 (23 Feb 2025 09:45), Max: 36.6 (22 Feb 2025 20:40)  T(F): 97.7 (23 Feb 2025 09:45), Max: 97.8 (22 Feb 2025 20:40)  HR: 78 (23 Feb 2025 09:45) (65 - 94)  BP: 143/77 (23 Feb 2025 09:45) (118/60 - 144/69)  BP(mean): 95 (23 Feb 2025 09:45) (95 - 95)  RR: 16 (23 Feb 2025 09:45) (16 - 18)  SpO2: 100% (23 Feb 2025 09:45) (91% - 100%)    Parameters below as of 23 Feb 2025 09:45  Patient On (Oxygen Delivery Method): room air

## 2025-02-21 NOTE — DISCHARGE NOTE PROVIDER - CARE PROVIDER_API CALL
Corky Iraheta  Thoracic Surgery  [address] [place] [address], [address] [place] [address]  Phone: [phone]  Fax: [phone]  Follow Up Time: 2 weeks   Corky Iraheta  Thoracic Surgery  [address] [place] [address], [address] [place] [address]  Phone: [phone]  Fax: [phone]  Follow Up Time: 2 weeks    Eleazar Baltazar  Infectious Disease  [address] [place] [address]  Phone: [phone]  Fax: [phone]  Follow Up Time:

## 2025-02-21 NOTE — DISCHARGE NOTE PROVIDER - NSDCFUADDINST_GEN_ALL_CORE_FT
- Please walk 4-5 x per day; increase as tolerated. You may climb stairs.   - Continue to use the incentive spirometer.   - You may keep wounds uncovered. Please shower daily with soap and water.     - Follow up with Dr. Iraheta in 1-2 weeks [phone]. Please call the office to make an appointment.   - Have your Chest x-ray done 1-2 days prior to your appointment.    - Please bring copy of x-ray to your appointment.  - Follow up with primary care provider in one week.  - Take the prescribed pain medication ONLY as needed.  - Can use over the counter Ibuprofen & or Tylenol as directed on the bottle.   - Please take a laxative or stool softeners to help support your bowel movements while on narcotic pain medication as it can cause constipation. Laxatives & stool softeners can be available over the counter at your local pharmacy.  - Please remain active & ambulatory but refrain from heavy lifting. No lifting anything over 10 pounds.  - Please call the office at [phone] if you have fevers, chills, worsening shortness of breath, chest pain, warmth, redness or purulent discharge from the wound.  - Please walk 4-5 x per day; increase as tolerated. You may climb stairs.   - Continue to use the incentive spirometer.   - You can remove left chest dressing on Tuesday morning and begin to shower with all wounds uncovered. If you chose to shower before then, keep dressing site completely cover  to keep dry. After Tuesday, leave all wounds uncovered, suture will be removed in office.    - Can use over the counter Ibuprofen & or Tylenol as directed on the bottle.   - Please take a laxative or stool softeners to help support your bowel movements while on narcotic pain medication as it can cause constipation. Laxatives & stool softeners can be available over the counter at your local pharmacy.  - Please remain active & ambulatory but refrain from heavy lifting. No lifting anything over 10 pounds.  - Please call the office at [phone] if you have fevers, chills, worsening shortness of breath, chest pain, warmth, redness or purulent discharge from the wound.

## 2025-02-21 NOTE — DISCHARGE NOTE PROVIDER - PROVIDER TOKENS
PROVIDER:[TOKEN:[[id number]],FOLLOWUP:[2 weeks]] PROVIDER:[TOKEN:[[id number]],FOLLOWUP:[2 weeks]],PROVIDER:[TOKEN:[[id number]]]

## 2025-02-21 NOTE — PROGRESS NOTE ADULT - SUBJECTIVE AND OBJECTIVE BOX
YAYA MARIA                     MRN-[id number]    HPI:  54 year old male, current smoker, w/ hx of HTN, COPD, GERD, s/p gastric sleeve (2023 at [facility]), Renal Cell Carcinoma 2021 s/p Left nephrectomy and partial right nephrectomy. Presents with incidental findings of  multiple FDG avid lung nodules and lymphadenopathy. Scheduled for flexible bronchoscopy left video assisted thorascopic surgery, lung resection, possible mediastinal lymph node dissection  (07 Feb 2025 08:44)    CHIEF COMPLAINT: FOLLOW UP IN ICU FOR POSTOPERATIVE CARE OF PATIENT WHO IS S/P Left upper lobe wedge resection       PROCEDURES:     Flexible bronchoscopy, Left uniportal VATS, Left upper lobe wedge resection and mediastinal lymph node biopsy  2/13/25     ISSUES:     Lung nodule              Postop pain              Chest tube in place              At risk for respiratory complications  Morbid obesity  Cigarette smoker  HTN (hypertension)  GERD (gastroesophageal reflux disease)  History of prediabetes  Deviated nasal septum  Malignant neoplasm of unspecified kidney, except renal pelvis  Renal cell cancer s/p S/p nephrectomy right 9/3/2021  COPD (chronic obstructive pulmonary disease)  H/O partial nephrectomy - L              S/P gastric sleeve procedure    PAST MEDICAL & SURGICAL HISTORY:  Morbid obesity      Cigarette smoker      HTN (hypertension)      GERD (gastroesophageal reflux disease)      History of prediabetes      Deviated nasal septum      Malignant neoplasm of unspecified kidney, except renal pelvis      Renal cell cancer      Other nonspecific abnormal finding of lung field      COPD (chronic obstructive pulmonary disease)      H/O colonoscopy      S/p nephrectomy  right 9/3/2021      H/O partial nephrectomy      S/P gastric sleeve procedure                VITAL SIGNS:  Vital Signs Last 24 Hrs  T(C): 36.5 (21 Feb 2025 08:00), Max: 36.9 (20 Feb 2025 16:00)  T(F): 97.7 (21 Feb 2025 08:00), Max: 98.4 (20 Feb 2025 16:00)  HR: 72 (21 Feb 2025 08:00) (61 - 77)  BP: 128/82 (21 Feb 2025 08:00) (104/83 - 138/76)  BP(mean): 97 (21 Feb 2025 05:09) (70 - 97)  RR: 19 (21 Feb 2025 08:00) (15 - 20)  SpO2: 98% (21 Feb 2025 08:00) (96% - 99%)    Parameters below as of 21 Feb 2025 08:00  Patient On (Oxygen Delivery Method): room air        I/Os:   I&O's Detail    20 Feb 2025 07:01  -  21 Feb 2025 07:00  --------------------------------------------------------  IN:    Oral Fluid: 1340 mL  Total IN: 1340 mL    OUT:    Chest Tube (mL): 130 mL    Voided (mL): 1525 mL  Total OUT: 1655 mL    Total NET: -315 mL      21 Feb 2025 07:01  -  21 Feb 2025 11:51  --------------------------------------------------------  IN:    Oral Fluid: 180 mL  Total IN: 180 mL    OUT:    Chest Tube (mL): 10 mL  Total OUT: 10 mL    Total NET: 170 mL          CAPILLARY BLOOD GLUCOSE          =======================MEDICATIONS===================  MEDICATIONS  (STANDING):  bisacodyl 5 milliGRAM(s) Oral at bedtime  chlorhexidine 2% Cloths 1 Application(s) Topical daily  fluticasone propionate/ salmeterol 100-50 MICROgram(s) Diskus 1 Dose(s) Inhalation two times a day  heparin   Injectable 7500 Unit(s) SubCutaneous every 8 hours  labetalol 200 milliGRAM(s) Oral every 8 hours  lidocaine   4% Patch 1 Patch Transdermal every 24 hours  losartan 50 milliGRAM(s) Oral daily  pantoprazole    Tablet 40 milliGRAM(s) Oral before breakfast  polyethylene glycol 3350 17 Gram(s) Oral daily  senna 2 Tablet(s) Oral at bedtime    MEDICATIONS  (PRN):  acetaminophen     Tablet .. 975 milliGRAM(s) Oral every 6 hours PRN Mild Pain (1 - 3)  ALPRAZolam 0.5 milliGRAM(s) Oral every 8 hours PRN anxiety  naloxone Injectable 0.1 milliGRAM(s) IV Push every 3 minutes PRN For ANY of the following changes in patient status:  A. RR LESS THAN 10 breaths per minute, B. Oxygen saturation LESS THAN 90%, C. Sedation score of 6  ondansetron Injectable 4 milliGRAM(s) IV Push every 6 hours PRN Nausea  oxyCODONE    IR 5 milliGRAM(s) Oral every 3 hours PRN Moderate Pain (4 - 6)  oxyCODONE    IR 10 milliGRAM(s) Oral every 3 hours PRN Severe Pain (7 - 10)  sodium chloride 3%  Inhalation 4 milliLiter(s) Inhalation every 8 hours PRN Sputum Induction      PHYSICAL EXAM============================  General:                         Awake, alert, not in any distress  Neuro:                            Moving all extremities to commands.   Respiratory:	Air entry fair and  bilateral conducted sounds                                           Effort even and unlabored.  CV:		Regular rate and rhythm. Normal S1/S2                                          Distal pulses present.  Abdomen:	                     Soft, non-distended. Bowel sounds present   Skin:		No rash.  Extremities:	Warm, no cyanosis or edema.  Palpable pulses    ============================LABS=========================                        15.6   8.07  )-----------( 192      ( 21 Feb 2025 02:46 )             48.0     02-21    137  |  101  |  25[H]  ----------------------------<  94  4.4   |  24  |  1.15    Ca    9.1      21 Feb 2025 02:46  Phos  3.4     02-21  Mg     2.20     02-21            Urinalysis Basic - ( 21 Feb 2025 02:46 )    Color: x / Appearance: x / SG: x / pH: x  Gluc: 94 mg/dL / Ketone: x  / Bili: x / Urobili: x   Blood: x / Protein: x / Nitrite: x   Leuk Esterase: x / RBC: x / WBC x   Sq Epi: x / Non Sq Epi: x / Bacteria: x      ASSESSMENT AND PLAN:     NEURO:  Post-operative Pain - Stable. Pain control with oxycodone and Tylenol IV PRN.       RESPIRATORY:  Hypoxia - Wean nasal cannula for goal O2sat above 92. Obtain CXR. Incentive spirometry. Chest PT and frequent suctioning. Continue bronchodilators. OOB to chair & ambulate w/ assistance. Continuous pulse oximetry for support & to prevent decompensation.  Chest tube – Pleurevac regulated suctioning. + air leak, Monitor chest tube output.       CARDIOVASCULAR:  Hemodynamically stable - Not on pressors. Continue hemodynamic monitoring.  Telemetry (medical test) - Reviewed by me today independently. Normal sinus rhythm.  HTN - restart home ARB, continue metoprolol       RENAL:  H/o left and partial right nephrectomy, Stable - Monitor IOs and electrolytes. Keep K above 4.0 and Mg above 2.0.      RCC s/p Right nephrectomy and partial L-nephrectomy     Avoid hypotension and nephrotoxic agents      GASTROINTESTINAL:  GI prophylaxis not indicated  Zofran and Reglan IV PRN for nausea  Regular consistency diet         HEMATOLOGIC:  No signs of active bleeding. Monitor Hgb in CBC in AM  DVT prophylaxis with heparin subQ and SCDs.         INFECTIOUS DISEASE:  All surgical sites appear clean. No signs of active infection. Will monitor for fever and leukocytosis. F/u tissue and sputum AFbs F/U  ID RECs    ENDOCRINE:  Stable – Monitor glucose fingersticks for goal 120-180.           ONCOLOGY:  Lung nodule - Improved. S/P resection. Follow up final pathology.      Pertinent clinical, laboratory, radiographic, hemodynamic, echocardiographic, respiratory data, microbiologic data and chart were reviewed by myself and analyzed frequently throughout the course of the day and night by myself.    Plan discussed at length with the CTICU staff and Attending CT Surgeon -   Dr Corky Iraheta    Patient's status was discussed with patient at bedside.    I have spent 55 minutes of time with this patient monitoring hemodynamic status, respiratory status, and coordinating care in the ICU.    Kayla Harrell DO FACEP

## 2025-02-21 NOTE — DISCHARGE NOTE PROVIDER - NSDCFUADDAPPT_GEN_ALL_CORE_FT
Please follow up with Dr. Iraheta in the office in 2 weeks. Please call and make an appointment. You may shower but remove all dressings first. Leave the white steri strips in place, they can get wet and they will fall off on their own. Continue your current treatment regimen as directed by your primary care provider. Please also follow up with your primary care provider in one week.  Please follow up with Dr. Iraheta in the office in 2 weeks. Please call and make an appointment. [phone]. Have a chest xray done prior to your apt.   See your PCP as needed.  Follow up with Infectious Disease, Dr. Baltazar in 2 weeks. Call for an apt.

## 2025-02-21 NOTE — DISCHARGE NOTE PROVIDER - NSDCFUSCHEDAPPT_GEN_ALL_CORE_FT
Pauline Orona Physician [facility]  UROLOGY [address] [facility]  Scheduled Appointment: 04/09/2025

## 2025-02-21 NOTE — DISCHARGE NOTE PROVIDER - NSDCMRMEDTOKEN_GEN_ALL_CORE_FT
losartan 50 mg oral tablet: 1 tab(s) orally once a day AM  metoprolol: 25 milligram(s) orally once a day  tadalafil 20 mg oral tablet: 1 tab(s) orally once a day  Trelegy Ellipta 200 mcg-62.5 mcg-25 mcg/inh inhalation powder: 1 puff(s) inhaled once a day   acetaminophen 325 mg oral tablet: 3 tab(s) orally every 6 hours As needed Mild Pain (1 - 3)  bisacodyl 5 mg oral delayed release tablet: 1 tab(s) orally once a day (at bedtime) as needed for  constipation  losartan 50 mg oral tablet: 1 tab(s) orally once a day AM  metoprolol: 25 milligram(s) orally once a day  oxyCODONE 5 mg oral tablet: 1 tab(s) orally every 4 hours as needed for Moderate Pain to severe pain MDD: 6 tabs  polyethylene glycol 3350 oral powder for reconstitution: 17 gram(s) orally once a day  senna leaf extract oral tablet: 2 tab(s) orally once a day (at bedtime)  tadalafil 20 mg oral tablet: 1 tab(s) orally once a day  Trelegy Ellipta 200 mcg-62.5 mcg-25 mcg/inh inhalation powder: 1 puff(s) inhaled once a day

## 2025-02-21 NOTE — DISCHARGE NOTE PROVIDER - NSDCACTIVITY_GEN_ALL_CORE
Showering allowed/Stairs allowed/Walking - Indoors allowed/No heavy lifting/straining/Walking - Outdoors allowed/Follow Instructions Provided by your Surgical Team Do not drive or operate machinery/Showering allowed/Do not make important decisions/Stairs allowed/Walking - Indoors allowed/No heavy lifting/straining/Walking - Outdoors allowed/Follow Instructions Provided by your Surgical Team/Activity as tolerated

## 2025-02-21 NOTE — DISCHARGE NOTE PROVIDER - NSDCCPTREATMENT_GEN_ALL_CORE_FT
PRINCIPAL PROCEDURE  Procedure: Bronchoscopy, with lung wedge resection using VATS  Findings and Treatment:

## 2025-02-21 NOTE — DISCHARGE NOTE PROVIDER - CARE PROVIDERS DIRECT ADDRESSES
[email or web address]

## 2025-02-22 LAB
ANION GAP SERPL CALC-SCNC: 12 MMOL/L — SIGNIFICANT CHANGE UP (ref 7–14)
BUN SERPL-MCNC: 20 MG/DL — SIGNIFICANT CHANGE UP (ref 7–23)
CALCIUM SERPL-MCNC: 9 MG/DL — SIGNIFICANT CHANGE UP (ref 8.4–10.5)
CHLORIDE SERPL-SCNC: 97 MMOL/L — LOW (ref 98–107)
CO2 SERPL-SCNC: 24 MMOL/L — SIGNIFICANT CHANGE UP (ref 22–31)
CREAT SERPL-MCNC: 1.23 MG/DL — SIGNIFICANT CHANGE UP (ref 0.5–1.3)
EGFR: 69 ML/MIN/1.73M2 — SIGNIFICANT CHANGE UP
GLUCOSE SERPL-MCNC: 100 MG/DL — HIGH (ref 70–99)
HCT VFR BLD CALC: 47.5 % — SIGNIFICANT CHANGE UP (ref 39–50)
HGB BLD-MCNC: 15.6 G/DL — SIGNIFICANT CHANGE UP (ref 13–17)
MAGNESIUM SERPL-MCNC: 2.2 MG/DL — SIGNIFICANT CHANGE UP (ref 1.6–2.6)
MCHC RBC-ENTMCNC: 29.8 PG — SIGNIFICANT CHANGE UP (ref 27–34)
MCHC RBC-ENTMCNC: 32.8 G/DL — SIGNIFICANT CHANGE UP (ref 32–36)
MCV RBC AUTO: 90.6 FL — SIGNIFICANT CHANGE UP (ref 80–100)
NIGHT BLUE STAIN TISS: ABNORMAL
NRBC # BLD AUTO: 0 K/UL — SIGNIFICANT CHANGE UP (ref 0–0)
NRBC # FLD: 0 K/UL — SIGNIFICANT CHANGE UP (ref 0–0)
NRBC BLD AUTO-RTO: 0 /100 WBCS — SIGNIFICANT CHANGE UP (ref 0–0)
PHOSPHATE SERPL-MCNC: 3.4 MG/DL — SIGNIFICANT CHANGE UP (ref 2.5–4.5)
PLATELET # BLD AUTO: 200 K/UL — SIGNIFICANT CHANGE UP (ref 150–400)
POTASSIUM SERPL-MCNC: 4.5 MMOL/L — SIGNIFICANT CHANGE UP (ref 3.5–5.3)
POTASSIUM SERPL-SCNC: 4.5 MMOL/L — SIGNIFICANT CHANGE UP (ref 3.5–5.3)
RBC # BLD: 5.24 M/UL — SIGNIFICANT CHANGE UP (ref 4.2–5.8)
RBC # FLD: 13.7 % — SIGNIFICANT CHANGE UP (ref 10.3–14.5)
SODIUM SERPL-SCNC: 133 MMOL/L — LOW (ref 135–145)
WBC # BLD: 8.27 K/UL — SIGNIFICANT CHANGE UP (ref 3.8–10.5)
WBC # FLD AUTO: 8.27 K/UL — SIGNIFICANT CHANGE UP (ref 3.8–10.5)

## 2025-02-22 PROCEDURE — 71045 X-RAY EXAM CHEST 1 VIEW: CPT | Mod: 26,76

## 2025-02-22 RX ORDER — OXYCODONE HYDROCHLORIDE 30 MG/1
5 TABLET ORAL
Refills: 0 | Status: DISCONTINUED | OUTPATIENT
Start: 2025-02-22 | End: 2025-02-23

## 2025-02-22 RX ORDER — OXYCODONE HYDROCHLORIDE 30 MG/1
10 TABLET ORAL
Refills: 0 | Status: DISCONTINUED | OUTPATIENT
Start: 2025-02-22 | End: 2025-02-23

## 2025-02-22 RX ORDER — ALPRAZOLAM 0.5 MG
0.5 TABLET, EXTENDED RELEASE 24 HR ORAL THREE TIMES A DAY
Refills: 0 | Status: DISCONTINUED | OUTPATIENT
Start: 2025-02-22 | End: 2025-02-23

## 2025-02-22 RX ADMIN — OXYCODONE HYDROCHLORIDE 10 MILLIGRAM(S): 30 TABLET ORAL at 06:46

## 2025-02-22 RX ADMIN — Medication 40 MILLIGRAM(S): at 05:21

## 2025-02-22 RX ADMIN — HEPARIN SODIUM 7500 UNIT(S): 1000 INJECTION INTRAVENOUS; SUBCUTANEOUS at 13:01

## 2025-02-22 RX ADMIN — Medication 1 DOSE(S): at 21:06

## 2025-02-22 RX ADMIN — HEPARIN SODIUM 7500 UNIT(S): 1000 INJECTION INTRAVENOUS; SUBCUTANEOUS at 05:21

## 2025-02-22 RX ADMIN — OXYCODONE HYDROCHLORIDE 10 MILLIGRAM(S): 30 TABLET ORAL at 21:45

## 2025-02-22 RX ADMIN — OXYCODONE HYDROCHLORIDE 10 MILLIGRAM(S): 30 TABLET ORAL at 13:13

## 2025-02-22 RX ADMIN — HEPARIN SODIUM 7500 UNIT(S): 1000 INJECTION INTRAVENOUS; SUBCUTANEOUS at 21:09

## 2025-02-22 RX ADMIN — OXYCODONE HYDROCHLORIDE 10 MILLIGRAM(S): 30 TABLET ORAL at 03:13

## 2025-02-22 RX ADMIN — OXYCODONE HYDROCHLORIDE 10 MILLIGRAM(S): 30 TABLET ORAL at 06:16

## 2025-02-22 RX ADMIN — Medication 0.5 MILLIGRAM(S): at 17:35

## 2025-02-22 RX ADMIN — LOSARTAN POTASSIUM 50 MILLIGRAM(S): 100 TABLET, FILM COATED ORAL at 05:21

## 2025-02-22 RX ADMIN — LABETALOL HYDROCHLORIDE 200 MILLIGRAM(S): 200 TABLET, FILM COATED ORAL at 05:21

## 2025-02-22 RX ADMIN — OXYCODONE HYDROCHLORIDE 10 MILLIGRAM(S): 30 TABLET ORAL at 21:05

## 2025-02-22 RX ADMIN — LIDOCAINE HYDROCHLORIDE 1 PATCH: 20 JELLY TOPICAL at 17:28

## 2025-02-22 RX ADMIN — OXYCODONE HYDROCHLORIDE 10 MILLIGRAM(S): 30 TABLET ORAL at 12:13

## 2025-02-22 RX ADMIN — LABETALOL HYDROCHLORIDE 200 MILLIGRAM(S): 200 TABLET, FILM COATED ORAL at 13:01

## 2025-02-22 RX ADMIN — OXYCODONE HYDROCHLORIDE 10 MILLIGRAM(S): 30 TABLET ORAL at 03:43

## 2025-02-22 NOTE — PROGRESS NOTE ADULT - PROVIDER SPECIALTY LIST ADULT
Anesthesia
CT Surgery
Critical Care
Infectious Disease
Infectious Disease
Pain Medicine

## 2025-02-22 NOTE — PROGRESS NOTE ADULT - SUBJECTIVE AND OBJECTIVE BOX
Subjective: NAEON    Vital Signs:  Vital Signs Last 24 Hrs  T(C): 36.6 (02-22-25 @ 05:05), Max: 37.1 (02-21-25 @ 20:28)  T(F): 97.9 (02-22-25 @ 05:05), Max: 98.7 (02-21-25 @ 20:28)  HR: 68 (02-22-25 @ 05:05) (68 - 73)  BP: 136/80 (02-22-25 @ 05:05) (124/70 - 146/64)  RR: 18 (02-22-25 @ 05:05) (18 - 18)  SpO2: 99% (02-22-25 @ 05:05) (95% - 99%) on (O2)    Telemetry/Alarms:  General: WN/WD NAD  Neurology: Awake, nonfocal, BA x 4  Eyes: Scleras clear, PERRLA/ EOMI, Gross vision intact  ENT:Gross hearing intact, grossly patent pharynx, no stridor  Neck: Neck supple, trachea midline, No JVD,   Respiratory: CTA B/L, No wheezing, rales, rhonchi  CV: RRR, S1S2, no murmurs, rubs or gallops  Abdominal: Soft, NT, ND +BS,   Extremities: No edema, + peripheral pulses  Skin: No Rashes, Hematoma, Ecchymosis  Lymphatic: No Neck, axilla, groin LAD  Psych: Oriented x 3, normal affect  Incisions: C/D/I  Tubes: L CT to sxn; no AL; 60/24h  Relevant labs, radiology and Medications reviewed                        15.6   8.27  )-----------( 200      ( 22 Feb 2025 05:05 )             47.5     02-22    133[L]  |  97[L]  |  20  ----------------------------<  100[H]  4.5   |  24  |  1.23    Ca    9.0      22 Feb 2025 05:05  Phos  3.4     02-22  Mg     2.20     02-22        ABG:  CXR: L apical PTX  MEDICATIONS  (STANDING):  bisacodyl 5 milliGRAM(s) Oral at bedtime  chlorhexidine 2% Cloths 1 Application(s) Topical daily  fluticasone propionate/ salmeterol 100-50 MICROgram(s) Diskus 1 Dose(s) Inhalation two times a day  heparin   Injectable 7500 Unit(s) SubCutaneous every 8 hours  labetalol 200 milliGRAM(s) Oral every 8 hours  lidocaine   4% Patch 1 Patch Transdermal every 24 hours  losartan 50 milliGRAM(s) Oral daily  pantoprazole    Tablet 40 milliGRAM(s) Oral before breakfast  polyethylene glycol 3350 17 Gram(s) Oral daily  senna 2 Tablet(s) Oral at bedtime    MEDICATIONS  (PRN):  acetaminophen     Tablet .. 975 milliGRAM(s) Oral every 6 hours PRN Mild Pain (1 - 3)  naloxone Injectable 0.1 milliGRAM(s) IV Push every 3 minutes PRN For ANY of the following changes in patient status:  A. RR LESS THAN 10 breaths per minute, B. Oxygen saturation LESS THAN 90%, C. Sedation score of 6  ondansetron Injectable 4 milliGRAM(s) IV Push every 6 hours PRN Nausea  oxyCODONE    IR 10 milliGRAM(s) Oral every 3 hours PRN Severe Pain (7 - 10)  oxyCODONE    IR 5 milliGRAM(s) Oral every 3 hours PRN Moderate Pain (4 - 6)  sodium chloride 3%  Inhalation 4 milliLiter(s) Inhalation every 8 hours PRN Sputum Induction    Pertinent Physical Exam  I&O's Summary    21 Feb 2025 07:01  -  22 Feb 2025 07:00  --------------------------------------------------------  IN: 1360 mL / OUT: 60 mL / NET: 1300 mL        Assessment  54y Male  w/ PAST MEDICAL & SURGICAL HISTORY:  Morbid obesity      Cigarette smoker      HTN (hypertension)      GERD (gastroesophageal reflux disease)      History of prediabetes      Deviated nasal septum      Malignant neoplasm of unspecified kidney, except renal pelvis      Renal cell cancer      Other nonspecific abnormal finding of lung field      COPD (chronic obstructive pulmonary disease)      H/O colonoscopy      S/p nephrectomy  right 9/3/2021      H/O partial nephrectomy      S/P gastric sleeve procedure      Patient is a 54y old  Male who presents with a chief complaint of MELISSA Wedge Resection (21 Feb 2025 18:50).    Patient underwent Bronchoscopy, with lung wedge resection using VATS.    Postoperative course/issues:   Airleak    PLAN  Neuro: Pain management  Pulm: Encourage coughing, deep breathing and use of incentive spirometry. Daily CXR.   Cardio: Monitor telemetry/alarms  GI: Tolerating diet. Continue stool softeners.  Renal: monitor urine output, supplement electrolytes as needed  Vasc: Heparin SC/SCDs for DVT prophylaxis  Heme: Stable H/H. .   ID: Off antibiotics. Stable.  Therapy: OOB/ambulate  Tubes: Cont Chest tube on sxn this afternoon. To waterseal 2 p.m and repeat CXR    Discussed with Cardiothoracic Team at AM rounds.

## 2025-02-23 VITALS
OXYGEN SATURATION: 100 % | HEART RATE: 78 BPM | TEMPERATURE: 98 F | DIASTOLIC BLOOD PRESSURE: 77 MMHG | RESPIRATION RATE: 16 BRPM | SYSTOLIC BLOOD PRESSURE: 143 MMHG

## 2025-02-23 PROCEDURE — 71045 X-RAY EXAM CHEST 1 VIEW: CPT | Mod: 26,76

## 2025-02-23 PROCEDURE — 99238 HOSP IP/OBS DSCHRG MGMT 30/<: CPT

## 2025-02-23 RX ORDER — ACETAMINOPHEN 500 MG/5ML
3 LIQUID (ML) ORAL
Qty: 0 | Refills: 0 | DISCHARGE
Start: 2025-02-23

## 2025-02-23 RX ORDER — OXYCODONE HYDROCHLORIDE 30 MG/1
1 TABLET ORAL
Qty: 30 | Refills: 0
Start: 2025-02-23 | End: 2025-02-27

## 2025-02-23 RX ORDER — BISACODYL 5 MG
1 TABLET, DELAYED RELEASE (ENTERIC COATED) ORAL
Qty: 0 | Refills: 0 | DISCHARGE
Start: 2025-02-23

## 2025-02-23 RX ORDER — SENNA 187 MG
2 TABLET ORAL
Qty: 0 | Refills: 0 | DISCHARGE
Start: 2025-02-23

## 2025-02-23 RX ORDER — POLYETHYLENE GLYCOL 3350 17 G/17G
17 POWDER, FOR SOLUTION ORAL
Qty: 0 | Refills: 0 | DISCHARGE
Start: 2025-02-23

## 2025-02-23 RX ADMIN — HEPARIN SODIUM 7500 UNIT(S): 1000 INJECTION INTRAVENOUS; SUBCUTANEOUS at 06:01

## 2025-02-23 RX ADMIN — OXYCODONE HYDROCHLORIDE 10 MILLIGRAM(S): 30 TABLET ORAL at 09:10

## 2025-02-23 RX ADMIN — LABETALOL HYDROCHLORIDE 200 MILLIGRAM(S): 200 TABLET, FILM COATED ORAL at 06:00

## 2025-02-23 RX ADMIN — LIDOCAINE HYDROCHLORIDE 1 PATCH: 20 JELLY TOPICAL at 01:05

## 2025-02-23 RX ADMIN — LABETALOL HYDROCHLORIDE 200 MILLIGRAM(S): 200 TABLET, FILM COATED ORAL at 12:57

## 2025-02-23 RX ADMIN — OXYCODONE HYDROCHLORIDE 10 MILLIGRAM(S): 30 TABLET ORAL at 10:10

## 2025-02-23 RX ADMIN — Medication 40 MILLIGRAM(S): at 06:00

## 2025-02-23 RX ADMIN — LOSARTAN POTASSIUM 50 MILLIGRAM(S): 100 TABLET, FILM COATED ORAL at 06:01

## 2025-02-23 RX ADMIN — OXYCODONE HYDROCHLORIDE 10 MILLIGRAM(S): 30 TABLET ORAL at 01:35

## 2025-02-23 RX ADMIN — OXYCODONE HYDROCHLORIDE 10 MILLIGRAM(S): 30 TABLET ORAL at 01:01

## 2025-02-23 RX ADMIN — Medication 1 DOSE(S): at 09:10

## 2025-02-23 NOTE — DISCHARGE NOTE NURSING/CASE MANAGEMENT/SOCIAL WORK - NSDCFUADDAPPT_GEN_ALL_CORE_FT
Please follow up with Dr. Iraheta in the office in 2 weeks. Please call and make an appointment. You may shower but remove all dressings first. Leave the white steri strips in place, they can get wet and they will fall off on their own. Continue your current treatment regimen as directed by your primary care provider. Please also follow up with your primary care provider in one week.

## 2025-02-23 NOTE — DISCHARGE NOTE NURSING/CASE MANAGEMENT/SOCIAL WORK - FINANCIAL ASSISTANCE
[facility] provides services at a reduced cost to those who are determined to be eligible through [facility]’s financial assistance program. Information regarding [facility]’s financial assistance program can be found by going to [email or web address] or by calling [phone].

## 2025-02-23 NOTE — DISCHARGE NOTE NURSING/CASE MANAGEMENT/SOCIAL WORK - NSDCPNINST_GEN_ALL_CORE
Take medication as ordered, follow up with MD as advised, gradually increase activity, do not lift anything over 10 pounds until see MD, monitor site for infection, call MD if fever over 100.4, redness, swelling or discharge at site. Eat a heart healthy diet

## 2025-02-23 NOTE — DISCHARGE NOTE NURSING/CASE MANAGEMENT/SOCIAL WORK - NSDCPEFALRISK_GEN_ALL_CORE
For information on Fall & Injury Prevention, visit: [email or web address] OR  [email or web address] OR  https://www.cdc.gov/steadi/patient.html

## 2025-02-23 NOTE — DISCHARGE NOTE NURSING/CASE MANAGEMENT/SOCIAL WORK - PATIENT PORTAL LINK FT
You can access the FollowMyHealth Patient Portal offered by [facility] by registering at the following website: [email or web address]. By joining [employer]’s FollowMyHealth portal, you will also be able to view your health information using other applications (apps) compatible with our system.

## 2025-02-24 LAB — SURGICAL PATHOLOGY STUDY: SIGNIFICANT CHANGE UP

## 2025-02-27 PROBLEM — J44.9 CHRONIC OBSTRUCTIVE PULMONARY DISEASE, UNSPECIFIED: Chronic | Status: ACTIVE | Noted: 2025-02-07

## 2025-02-27 PROBLEM — R91.8 OTHER NONSPECIFIC ABNORMAL FINDING OF LUNG FIELD: Chronic | Status: ACTIVE | Noted: 2025-02-07

## 2025-02-28 ENCOUNTER — NON-APPOINTMENT (OUTPATIENT)
Age: 55
End: 2025-02-28

## 2025-02-28 PROBLEM — C64.9 MALIGNANT NEOPLASM OF UNSPECIFIED KIDNEY, EXCEPT RENAL PELVIS: Chronic | Status: ACTIVE | Noted: 2025-02-07

## 2025-02-28 LAB — NIGHT BLUE STAIN TISS: ABNORMAL

## 2025-03-03 ENCOUNTER — RX RENEWAL (OUTPATIENT)
Age: 55
End: 2025-03-03

## 2025-03-03 LAB
CULTURE RESULTS: SIGNIFICANT CHANGE UP
SPECIMEN SOURCE: SIGNIFICANT CHANGE UP

## 2025-03-11 ENCOUNTER — APPOINTMENT (OUTPATIENT)
Dept: RADIOLOGY | Facility: HOSPITAL | Age: 55
End: 2025-03-11

## 2025-03-11 ENCOUNTER — APPOINTMENT (OUTPATIENT)
Dept: THORACIC SURGERY | Facility: CLINIC | Age: 55
End: 2025-03-11
Payer: MEDICAID

## 2025-03-11 ENCOUNTER — APPOINTMENT (OUTPATIENT)
Dept: PULMONOLOGY | Facility: CLINIC | Age: 55
End: 2025-03-11
Payer: MEDICAID

## 2025-03-11 ENCOUNTER — RESULT REVIEW (OUTPATIENT)
Age: 55
End: 2025-03-11

## 2025-03-11 ENCOUNTER — OUTPATIENT (OUTPATIENT)
Dept: OUTPATIENT SERVICES | Facility: HOSPITAL | Age: 55
LOS: 1 days | End: 2025-03-11
Payer: MEDICAID

## 2025-03-11 VITALS
OXYGEN SATURATION: 97 % | BODY MASS INDEX: 33.13 KG/M2 | HEART RATE: 80 BPM | TEMPERATURE: 97.6 F | WEIGHT: 250 LBS | DIASTOLIC BLOOD PRESSURE: 90 MMHG | RESPIRATION RATE: 16 BRPM | SYSTOLIC BLOOD PRESSURE: 142 MMHG | HEIGHT: 73 IN

## 2025-03-11 VITALS
HEART RATE: 82 BPM | RESPIRATION RATE: 17 BRPM | SYSTOLIC BLOOD PRESSURE: 165 MMHG | WEIGHT: 250 LBS | OXYGEN SATURATION: 100 % | BODY MASS INDEX: 33.13 KG/M2 | HEIGHT: 73 IN | DIASTOLIC BLOOD PRESSURE: 111 MMHG

## 2025-03-11 DIAGNOSIS — Z90.3 ACQUIRED ABSENCE OF STOMACH [PART OF]: Chronic | ICD-10-CM

## 2025-03-11 DIAGNOSIS — Z90.5 ACQUIRED ABSENCE OF KIDNEY: Chronic | ICD-10-CM

## 2025-03-11 DIAGNOSIS — J44.9 CHRONIC OBSTRUCTIVE PULMONARY DISEASE, UNSPECIFIED: ICD-10-CM

## 2025-03-11 DIAGNOSIS — M31.30 WEGENER'S GRANULOMATOSIS W/OUT RENAL INVOLVEMENT: ICD-10-CM

## 2025-03-11 DIAGNOSIS — R93.89 ABNORMAL FINDINGS ON DIAGNOSTIC IMAGING OF OTHER SPECIFIED BODY STRUCTURES: ICD-10-CM

## 2025-03-11 DIAGNOSIS — E66.3 OVERWEIGHT: ICD-10-CM

## 2025-03-11 DIAGNOSIS — J90 PLEURAL EFFUSION, NOT ELSEWHERE CLASSIFIED: ICD-10-CM

## 2025-03-11 DIAGNOSIS — K21.9 GASTRO-ESOPHAGEAL REFLUX DISEASE W/OUT ESOPHAGITIS: ICD-10-CM

## 2025-03-11 PROCEDURE — 71046 X-RAY EXAM CHEST 2 VIEWS: CPT | Mod: 26

## 2025-03-11 PROCEDURE — 99214 OFFICE O/P EST MOD 30 MIN: CPT | Mod: 25

## 2025-03-11 PROCEDURE — 99212 OFFICE O/P EST SF 10 MIN: CPT

## 2025-03-11 RX ORDER — OMEPRAZOLE 40 MG/1
40 CAPSULE, DELAYED RELEASE ORAL
Qty: 30 | Refills: 1 | Status: ACTIVE | COMMUNITY
Start: 2025-03-11 | End: 1900-01-01

## 2025-03-15 LAB
CULTURE RESULTS: SIGNIFICANT CHANGE UP
SPECIMEN SOURCE: SIGNIFICANT CHANGE UP

## 2025-03-28 NOTE — ASU PREOP CHECKLIST - SIDE RAILS UP
I have reviewed the images for this patient at Dr. Abbasi's request.    Typically, in the setting of objective evidence of ischemia, coronary artery aneurysm surgery is indicated, though I appreciate the concerns regarding the patient's functional state. n/a

## 2025-04-02 ENCOUNTER — APPOINTMENT (OUTPATIENT)
Dept: OPHTHALMOLOGY | Facility: CLINIC | Age: 55
End: 2025-04-02

## 2025-04-09 ENCOUNTER — APPOINTMENT (OUTPATIENT)
Dept: UROLOGY | Facility: CLINIC | Age: 55
End: 2025-04-09
Payer: MEDICAID

## 2025-04-09 DIAGNOSIS — C64.9 MALIGNANT NEOPLASM OF UNSPECIFIED KIDNEY, EXCEPT RENAL PELVIS: ICD-10-CM

## 2025-04-09 PROCEDURE — 99214 OFFICE O/P EST MOD 30 MIN: CPT

## 2025-04-09 PROCEDURE — G2211 COMPLEX E/M VISIT ADD ON: CPT | Mod: NC

## 2025-04-10 LAB
ANION GAP SERPL CALC-SCNC: 15 MMOL/L
APPEARANCE: CLEAR
BACTERIA: NEGATIVE /HPF
BILIRUBIN URINE: NEGATIVE
BLOOD URINE: NEGATIVE
BUN SERPL-MCNC: 17 MG/DL
CALCIUM SERPL-MCNC: 9.7 MG/DL
CAST: 0 /LPF
CHLORIDE SERPL-SCNC: 101 MMOL/L
CO2 SERPL-SCNC: 24 MMOL/L
COLOR: YELLOW
CREAT SERPL-MCNC: 1.27 MG/DL
EGFRCR SERPLBLD CKD-EPI 2021: 67 ML/MIN/1.73M2
EPITHELIAL CELLS: 0 /HPF
GLUCOSE QUALITATIVE U: NEGATIVE MG/DL
GLUCOSE SERPL-MCNC: 86 MG/DL
KETONES URINE: NEGATIVE MG/DL
LEUKOCYTE ESTERASE URINE: ABNORMAL
MICROSCOPIC-UA: NORMAL
NITRITE URINE: NEGATIVE
PH URINE: 6
POTASSIUM SERPL-SCNC: 4.3 MMOL/L
PROTEIN URINE: NORMAL MG/DL
PSA FREE FLD-MCNC: 29 %
PSA FREE SERPL-MCNC: 0.68 NG/ML
PSA SERPL-MCNC: 2.37 NG/ML
RED BLOOD CELLS URINE: 1 /HPF
SODIUM SERPL-SCNC: 140 MMOL/L
SPECIFIC GRAVITY URINE: 1.02
URINE CYTOLOGY: NORMAL
UROBILINOGEN URINE: 1 MG/DL
WHITE BLOOD CELLS URINE: 1 /HPF

## 2025-04-15 ENCOUNTER — OUTPATIENT (OUTPATIENT)
Dept: OUTPATIENT SERVICES | Facility: HOSPITAL | Age: 55
LOS: 1 days | End: 2025-04-15

## 2025-04-15 ENCOUNTER — APPOINTMENT (OUTPATIENT)
Dept: RADIOLOGY | Facility: HOSPITAL | Age: 55
End: 2025-04-15

## 2025-04-15 ENCOUNTER — OUTPATIENT (OUTPATIENT)
Dept: OUTPATIENT SERVICES | Facility: HOSPITAL | Age: 55
LOS: 1 days | End: 2025-04-15
Payer: MEDICAID

## 2025-04-15 ENCOUNTER — APPOINTMENT (OUTPATIENT)
Dept: THORACIC SURGERY | Facility: CLINIC | Age: 55
End: 2025-04-15
Payer: MEDICAID

## 2025-04-15 VITALS
OXYGEN SATURATION: 96 % | SYSTOLIC BLOOD PRESSURE: 159 MMHG | RESPIRATION RATE: 17 BRPM | HEIGHT: 73 IN | DIASTOLIC BLOOD PRESSURE: 105 MMHG | HEART RATE: 90 BPM | WEIGHT: 250 LBS | BODY MASS INDEX: 33.13 KG/M2

## 2025-04-15 DIAGNOSIS — M31.30 WEGENER'S GRANULOMATOSIS WITHOUT RENAL INVOLVEMENT: ICD-10-CM

## 2025-04-15 DIAGNOSIS — Z90.5 ACQUIRED ABSENCE OF KIDNEY: Chronic | ICD-10-CM

## 2025-04-15 DIAGNOSIS — Z90.3 ACQUIRED ABSENCE OF STOMACH [PART OF]: Chronic | ICD-10-CM

## 2025-04-15 DIAGNOSIS — Z98.890 OTHER SPECIFIED POSTPROCEDURAL STATES: Chronic | ICD-10-CM

## 2025-04-15 DIAGNOSIS — R91.8 OTHER NONSPECIFIC ABNORMAL FINDING OF LUNG FIELD: ICD-10-CM

## 2025-04-15 DIAGNOSIS — Z00.8 ENCOUNTER FOR OTHER GENERAL EXAMINATION: ICD-10-CM

## 2025-04-15 DIAGNOSIS — M31.30 WEGENER'S GRANULOMATOSIS W/OUT RENAL INVOLVEMENT: ICD-10-CM

## 2025-04-15 DIAGNOSIS — R59.0 LOCALIZED ENLARGED LYMPH NODES: ICD-10-CM

## 2025-04-15 PROCEDURE — 71046 X-RAY EXAM CHEST 2 VIEWS: CPT | Mod: 26

## 2025-04-15 PROCEDURE — 99212 OFFICE O/P EST SF 10 MIN: CPT

## 2025-05-07 ENCOUNTER — RX RENEWAL (OUTPATIENT)
Age: 55
End: 2025-05-07

## 2025-05-20 ENCOUNTER — OUTPATIENT (OUTPATIENT)
Dept: OUTPATIENT SERVICES | Facility: HOSPITAL | Age: 55
LOS: 1 days | End: 2025-05-20
Payer: MEDICAID

## 2025-05-20 ENCOUNTER — APPOINTMENT (OUTPATIENT)
Dept: CT IMAGING | Facility: IMAGING CENTER | Age: 55
End: 2025-05-20
Payer: MEDICAID

## 2025-05-20 DIAGNOSIS — Z00.8 ENCOUNTER FOR OTHER GENERAL EXAMINATION: ICD-10-CM

## 2025-05-20 DIAGNOSIS — Z90.3 ACQUIRED ABSENCE OF STOMACH [PART OF]: Chronic | ICD-10-CM

## 2025-05-20 DIAGNOSIS — Z90.5 ACQUIRED ABSENCE OF KIDNEY: Chronic | ICD-10-CM

## 2025-05-20 DIAGNOSIS — C64.9 MALIGNANT NEOPLASM OF UNSPECIFIED KIDNEY, EXCEPT RENAL PELVIS: ICD-10-CM

## 2025-05-20 PROCEDURE — 74178 CT ABD&PLV WO CNTR FLWD CNTR: CPT | Mod: 26

## 2025-05-20 PROCEDURE — 74178 CT ABD&PLV WO CNTR FLWD CNTR: CPT

## 2025-06-05 ENCOUNTER — RX RENEWAL (OUTPATIENT)
Age: 55
End: 2025-06-05

## 2025-07-18 ENCOUNTER — APPOINTMENT (OUTPATIENT)
Dept: OPHTHALMOLOGY | Facility: CLINIC | Age: 55
End: 2025-07-18

## 2025-07-29 ENCOUNTER — RX RENEWAL (OUTPATIENT)
Age: 55
End: 2025-07-29

## (undated) DEVICE — ELCTR EXTENSION STRAIGHT

## (undated) DEVICE — ELCTR BOVIE TIP BLADE INSULATED 2.75" EDGE

## (undated) DEVICE — ELCTR GROUNDING PAD ADULT COVIDIEN

## (undated) DEVICE — PACK MAJOR ABDOMINAL W ENDO DRAPE

## (undated) DEVICE — SUT VICRYL 2-0 27" UR-6

## (undated) DEVICE — SUT VICRYL 0 27" CT-1 UNDYED

## (undated) DEVICE — WARMING BLANKET LOWER ADULT

## (undated) DEVICE — DRAPE MAGNETIC INSTRUMENT MEDIUM

## (undated) DEVICE — TAPE SILK 3"

## (undated) DEVICE — DURABLE MEDICAL EQUIPMENT: Type: DURABLE MEDICAL EQUIPMENT

## (undated) DEVICE — SUT SILK 0 18" TIES

## (undated) DEVICE — VISITEC 4X4

## (undated) DEVICE — DRSG TELFA 3 X 8

## (undated) DEVICE — TRAP SPECIMEN SPUTUM 40CC

## (undated) DEVICE — DRAPE IOBAN 33" X 23"

## (undated) DEVICE — TUBING SUCTION NONCONDUCTIVE 6MM X 12FT

## (undated) DEVICE — VENODYNE/SCD SLEEVE CALF MEDIUM

## (undated) DEVICE — DRSG STERISTRIPS 0.5 X 4"

## (undated) DEVICE — CONNECTOR REDUCING STRAIGHT 3/8X0.25"

## (undated) DEVICE — SUT PROLENE 0 30" CT-1

## (undated) DEVICE — DRAPE MAYO STAND 23"

## (undated) DEVICE — DISSECTOR ENDOSCOPIC KITTNER SINGLE TIP

## (undated) DEVICE — DRSG BENZOIN 0.6CC

## (undated) DEVICE — CHEST DRAIN OASIS DRY SUCTION WATER SEAL

## (undated) DEVICE — SUT MONOCRYL 4-0 27" PS-2 UNDYED

## (undated) DEVICE — NDL HYPO REGULAR BEVEL 25G X 1.5" (BLUE)

## (undated) DEVICE — STAPLER ETHICON ECHELON 3000 STANDARD 45MM X 340MM

## (undated) DEVICE — DRSG TEGADERM 4 X 4.75"

## (undated) DEVICE — PREP CHLORAPREP HI-LITE ORANGE 26ML

## (undated) DEVICE — ENDOCATCH GENERAL 10MM (PURPLE)

## (undated) DEVICE — FOLEY TRAY 16FR 5CC LF UMETER CLOSED

## (undated) DEVICE — ELCTR BOVIE TIP BLADE INSULATED 6.5" EDGE

## (undated) DEVICE — SYR SLIP 10CC

## (undated) DEVICE — PROTECTOR HEEL / ELBOW FLUFFY

## (undated) DEVICE — SYR LUER LOK 10CC

## (undated) DEVICE — SPECIMEN CONTAINER 100ML

## (undated) DEVICE — SOL ANTI FOG (FRED)

## (undated) DEVICE — SUT SILK 2-0 30" TIES

## (undated) DEVICE — DRAPE 3/4 SHEET 52X76"